# Patient Record
Sex: MALE | Race: WHITE | NOT HISPANIC OR LATINO | Employment: OTHER | URBAN - METROPOLITAN AREA
[De-identification: names, ages, dates, MRNs, and addresses within clinical notes are randomized per-mention and may not be internally consistent; named-entity substitution may affect disease eponyms.]

---

## 2017-06-23 ENCOUNTER — OFFICE VISIT (OUTPATIENT)
Dept: FAMILY MEDICINE CLINIC | Facility: CLINIC | Age: 67
End: 2017-06-23

## 2017-06-23 VITALS
RESPIRATION RATE: 18 BRPM | SYSTOLIC BLOOD PRESSURE: 152 MMHG | OXYGEN SATURATION: 98 % | WEIGHT: 228 LBS | TEMPERATURE: 98.1 F | HEART RATE: 76 BPM | HEIGHT: 74 IN | BODY MASS INDEX: 29.26 KG/M2 | DIASTOLIC BLOOD PRESSURE: 80 MMHG

## 2017-06-23 DIAGNOSIS — I10 ESSENTIAL HYPERTENSION: ICD-10-CM

## 2017-06-23 DIAGNOSIS — N32.81 OVERACTIVE BLADDER: ICD-10-CM

## 2017-06-23 DIAGNOSIS — E78.5 HYPERLIPIDEMIA, UNSPECIFIED HYPERLIPIDEMIA TYPE: Primary | ICD-10-CM

## 2017-06-23 DIAGNOSIS — R53.83 OTHER FATIGUE: ICD-10-CM

## 2017-06-23 DIAGNOSIS — G47.9 DYSSOMNIA: ICD-10-CM

## 2017-06-23 DIAGNOSIS — R60.0 EDEMA OF RIGHT LOWER EXTREMITY: ICD-10-CM

## 2017-06-23 DIAGNOSIS — R35.1 NOCTURIA: ICD-10-CM

## 2017-06-23 DIAGNOSIS — R09.81 CHRONIC NASAL CONGESTION: ICD-10-CM

## 2017-06-23 PROCEDURE — 99213 OFFICE O/P EST LOW 20 MIN: CPT

## 2017-06-23 NOTE — PROGRESS NOTES
Jessica Kuhn is a 67 y.o. male. Patient is here today for   Chief Complaint   Patient presents with   • Sleeping Problem     patient c/o not being able to sleep due to frequent trips to the bathroom and congestion          Vitals:    06/23/17 0824   BP: 152/80   Pulse: 76   Resp: 18   Temp: 98.1 °F (36.7 °C)   SpO2: 98%     The following portions of the patient's history were reviewed and updated as appropriate: allergies, current medications, past family history, past medical history, past social history, past surgical history and problem list.    Past Medical History:   Diagnosis Date   • Hyperlipidemia    • Hypertension       No Known Allergies   Social History     Social History   • Marital status:      Spouse name: N/A   • Number of children: N/A   • Years of education: N/A     Occupational History   • Not on file.     Social History Main Topics   • Smoking status: Never Smoker   • Smokeless tobacco: Not on file   • Alcohol use Yes   • Drug use: Not on file   • Sexual activity: Not on file     Other Topics Concern   • Not on file     Social History Narrative        Current Outpatient Prescriptions:   •  lisinopril (PRINIVIL,ZESTRIL) 20 MG tablet, Take 1 tablet by mouth daily., Disp: 90 tablet, Rfl: 3     Objective     History of Present Illness   The patient is here today for follow-up on essential hypertension, mild hyperlipidemia, fatigue, chronic nasal congestion, nocturia, and sleep disturbance    Review of Systems   Constitutional: Positive for fatigue. Negative for chills and fever.   HENT:        The patient has chronic nasal congestion and has been told he has a deviated septum.  He gives no history of a fractured nose.  The patient really does not have significant amount of postnasal drainage, sneezing, or clear rhinorrhea.   Respiratory: Negative for cough, shortness of breath and wheezing.    Cardiovascular: Negative for chest pain.        The patient has noted, for about  one year, that his right leg mildly swells up during the day and typically goes down overnight.  The patient states that his lower leg feels tight but denies any severe discomfort.  The patient has had no history of blood clots in the past although his father did have problems with blood clots in his legs   Gastrointestinal: Negative.    Genitourinary:        During the day the patient states that he urinates normally and has no urinary hesitancy.  However at night the patient urinates as often as every hour.  He denies hesitancy but does have a significant amount of urgency.  There is no dysuria.   Musculoskeletal:        Mild to moderate osteoarthritic aches and pains   Neurological: Negative.    Hematological: Negative.    Psychiatric/Behavioral:        The patient is not sleeping well but attributes this primarily to the nasal congestion and urinary frequency during the night       Physical Exam   Constitutional: He is oriented to person, place, and time. He appears well-developed and well-nourished.   Moderately overweight   HENT:   Mouth/Throat: Oropharynx is clear and moist.   Nose Seems slightly congested   Eyes: Pupils are equal, round, and reactive to light.   Cardiovascular: Normal rate, regular rhythm and normal heart sounds.    Pulmonary/Chest: Effort normal and breath sounds normal. No respiratory distress. He has no wheezes. He has no rales.   Musculoskeletal:   Mild osteoarthritic changes in multiple joints.  There is no significant swelling in the right lower leg or ankle at this time and there is no calf tenderness.  No significant varicosities   Neurological: He is alert and oriented to person, place, and time.   Skin: Skin is warm and dry.   Psychiatric: He has a normal mood and affect.   Nursing note and vitals reviewed.      ASSESSMENT  #1 nasal congestion       #2 essential hypertension       #3 hyperlipidemia       #4 nocturia        #5 sleep disturbance    DISCUSSION/SUMMARY   Initially the  patient's blood pressure was a bit elevated in the 150s systolic.  Upon recheck the blood pressure was 140/78.  The patient will monitor his blood pressure at home and let me know if the average systolic blood pressure exceeds 140.  The patient states that he has had mild swelling of the right lower leg for about the last year.  Typically he wakes up the morning with no swelling but then as the day progresses notices tightness in his right lower leg and a small amount of swelling.  He is having no significant calf muscle pain.  There are no obvious varicosities.  The patient tells me that his father had blood clots in his legs when the patient was in grade school.  I am going to order a venous Doppler study of the right lower extremity to assess his venous system.  The patient has no problems with urination during the day but at night he most of the time has to get up every 1-2 hours to urinate and has a feeling of urgency.  There is no dysuria.  No significant urinary hesitancy.  The patient states that his nose gets very congested at night and he has difficulty sleeping because of that as well.  Currently he has been taking some type of over-the-counter any histamines decongest and accommodation.  I have recommended that he transition to using over-the-counter Flonase nasal spray 2 sprays once a day and each nostril to see if this would help.  If it does not help he needs to see an ear nose and throat specialist because of his history of deviated septum.  The patient has a history of essential hypertension and mild hyperlipidemia as well as fatigue.  Today we will draw labs because the patient is essentially fasting    PLAN  Today we will draw a CMP, lipid panel, free T4, TSH, CBC, and total testosterone level.  No Follow-up on file.

## 2017-06-26 LAB
ALBUMIN SERPL-MCNC: 4.4 G/DL (ref 3.5–5.2)
ALBUMIN/GLOB SERPL: 1.7 G/DL
ALP SERPL-CCNC: 67 U/L (ref 39–117)
ALT SERPL-CCNC: 14 U/L (ref 1–41)
AST SERPL-CCNC: 19 U/L (ref 1–40)
BASOPHILS # BLD AUTO: 0.02 10*3/MM3 (ref 0–0.2)
BASOPHILS NFR BLD AUTO: 0.3 % (ref 0–1.5)
BILIRUB SERPL-MCNC: 0.3 MG/DL (ref 0.1–1.2)
BUN SERPL-MCNC: 16 MG/DL (ref 8–23)
BUN/CREAT SERPL: 16.3 (ref 7–25)
CALCIUM SERPL-MCNC: 9.8 MG/DL (ref 8.6–10.5)
CHLORIDE SERPL-SCNC: 102 MMOL/L (ref 98–107)
CHOLEST SERPL-MCNC: 220 MG/DL (ref 0–200)
CO2 SERPL-SCNC: 23.9 MMOL/L (ref 22–29)
CONV COMMENT: ABNORMAL
CREAT SERPL-MCNC: 0.98 MG/DL (ref 0.76–1.27)
EOSINOPHIL # BLD AUTO: 0.17 10*3/MM3 (ref 0–0.7)
EOSINOPHIL NFR BLD AUTO: 3 % (ref 0.3–6.2)
ERYTHROCYTE [DISTWIDTH] IN BLOOD BY AUTOMATED COUNT: 13.6 % (ref 11.5–14.5)
GLOBULIN SER CALC-MCNC: 2.6 GM/DL
GLUCOSE SERPL-MCNC: 102 MG/DL (ref 65–99)
HCT VFR BLD AUTO: 46.4 % (ref 40.4–52.2)
HDLC SERPL-MCNC: 76 MG/DL (ref 40–60)
HGB BLD-MCNC: 15.5 G/DL (ref 13.7–17.6)
IMM GRANULOCYTES # BLD: 0 10*3/MM3 (ref 0–0.03)
IMM GRANULOCYTES NFR BLD: 0 % (ref 0–0.5)
LDLC SERPL CALC-MCNC: 130 MG/DL (ref 0–100)
LDLC/HDLC SERPL: 1.71 {RATIO}
LYMPHOCYTES # BLD AUTO: 1.29 10*3/MM3 (ref 0.9–4.8)
LYMPHOCYTES NFR BLD AUTO: 22.5 % (ref 19.6–45.3)
MCH RBC QN AUTO: 31.2 PG (ref 27–32.7)
MCHC RBC AUTO-ENTMCNC: 33.4 G/DL (ref 32.6–36.4)
MCV RBC AUTO: 93.4 FL (ref 79.8–96.2)
MONOCYTES # BLD AUTO: 0.46 10*3/MM3 (ref 0.2–1.2)
MONOCYTES NFR BLD AUTO: 8 % (ref 5–12)
NEUTROPHILS # BLD AUTO: 3.8 10*3/MM3 (ref 1.9–8.1)
NEUTROPHILS NFR BLD AUTO: 66.2 % (ref 42.7–76)
PLATELET # BLD AUTO: 187 10*3/MM3 (ref 140–500)
POTASSIUM SERPL-SCNC: 4.4 MMOL/L (ref 3.5–5.2)
PROT SERPL-MCNC: 7 G/DL (ref 6–8.5)
PSA SERPL-MCNC: 1.75 NG/ML (ref 0–4)
RBC # BLD AUTO: 4.97 10*6/MM3 (ref 4.6–6)
SODIUM SERPL-SCNC: 143 MMOL/L (ref 136–145)
T4 FREE SERPL-MCNC: 1.19 NG/DL (ref 0.93–1.7)
TESTOST FREE SERPL-MCNC: 5.9 PG/ML (ref 6.6–18.1)
TESTOST SERPL-MCNC: 489 NG/DL (ref 348–1197)
TRIGL SERPL-MCNC: 72 MG/DL (ref 0–150)
TSH SERPL DL<=0.005 MIU/L-ACNC: 1.79 MIU/ML (ref 0.27–4.2)
VLDLC SERPL CALC-MCNC: 14.4 MG/DL (ref 5–40)
WBC # BLD AUTO: 5.74 10*3/MM3 (ref 4.5–10.7)

## 2017-06-27 ENCOUNTER — HOSPITAL ENCOUNTER (OUTPATIENT)
Dept: CARDIOLOGY | Facility: HOSPITAL | Age: 67
Discharge: HOME | End: 2017-06-27

## 2017-06-27 DIAGNOSIS — R60.0 EDEMA OF RIGHT LOWER EXTREMITY: ICD-10-CM

## 2017-06-27 LAB
BH CV LOWER VASCULAR LEFT COMMON FEMORAL AUGMENT: NORMAL
BH CV LOWER VASCULAR LEFT COMMON FEMORAL COMPETENT: NORMAL
BH CV LOWER VASCULAR LEFT COMMON FEMORAL COMPRESS: NORMAL
BH CV LOWER VASCULAR LEFT COMMON FEMORAL PHASIC: NORMAL
BH CV LOWER VASCULAR LEFT COMMON FEMORAL SPONT: NORMAL
BH CV LOWER VASCULAR RIGHT COMMON FEMORAL AUGMENT: NORMAL
BH CV LOWER VASCULAR RIGHT COMMON FEMORAL COMPETENT: NORMAL
BH CV LOWER VASCULAR RIGHT COMMON FEMORAL COMPRESS: NORMAL
BH CV LOWER VASCULAR RIGHT COMMON FEMORAL PHASIC: NORMAL
BH CV LOWER VASCULAR RIGHT COMMON FEMORAL SPONT: NORMAL
BH CV LOWER VASCULAR RIGHT DISTAL FEMORAL COMPRESS: NORMAL
BH CV LOWER VASCULAR RIGHT GASTRONEMIUS COMPRESS: NORMAL
BH CV LOWER VASCULAR RIGHT GREATER SAPH AK COMPRESS: NORMAL
BH CV LOWER VASCULAR RIGHT GREATER SAPH BK COMPRESS: NORMAL
BH CV LOWER VASCULAR RIGHT MID FEMORAL AUGMENT: NORMAL
BH CV LOWER VASCULAR RIGHT MID FEMORAL COMPETENT: NORMAL
BH CV LOWER VASCULAR RIGHT MID FEMORAL COMPRESS: NORMAL
BH CV LOWER VASCULAR RIGHT MID FEMORAL PHASIC: NORMAL
BH CV LOWER VASCULAR RIGHT MID FEMORAL SPONT: NORMAL
BH CV LOWER VASCULAR RIGHT PERONEAL COMPRESS: NORMAL
BH CV LOWER VASCULAR RIGHT POPLITEAL AUGMENT: NORMAL
BH CV LOWER VASCULAR RIGHT POPLITEAL COMPETENT: NORMAL
BH CV LOWER VASCULAR RIGHT POPLITEAL COMPRESS: NORMAL
BH CV LOWER VASCULAR RIGHT POPLITEAL PHASIC: NORMAL
BH CV LOWER VASCULAR RIGHT POPLITEAL SPONT: NORMAL
BH CV LOWER VASCULAR RIGHT POSTERIOR TIBIAL COMPRESS: NORMAL
BH CV LOWER VASCULAR RIGHT PROXIMAL FEMORAL COMPRESS: NORMAL
BH CV LOWER VASCULAR RIGHT SAPHENOFEMORAL JUNCTION AUGMENT: NORMAL
BH CV LOWER VASCULAR RIGHT SAPHENOFEMORAL JUNCTION COMPETENT: NORMAL
BH CV LOWER VASCULAR RIGHT SAPHENOFEMORAL JUNCTION COMPRESS: NORMAL
BH CV LOWER VASCULAR RIGHT SAPHENOFEMORAL JUNCTION PHASIC: NORMAL
BH CV LOWER VASCULAR RIGHT SAPHENOFEMORAL JUNCTION SPONT: NORMAL

## 2017-06-27 PROCEDURE — 93971 EXTREMITY STUDY: CPT

## 2017-09-12 RX ORDER — LISINOPRIL 20 MG/1
TABLET ORAL
Qty: 90 TABLET | Refills: 2 | Status: SHIPPED | OUTPATIENT
Start: 2017-09-12 | End: 2018-07-09 | Stop reason: SDUPTHER

## 2018-07-02 RX ORDER — LISINOPRIL 20 MG/1
TABLET ORAL
Qty: 90 TABLET | Refills: 1 | OUTPATIENT
Start: 2018-07-02

## 2018-07-09 RX ORDER — LISINOPRIL 20 MG/1
20 TABLET ORAL DAILY
Qty: 90 TABLET | Refills: 0 | Status: SHIPPED | OUTPATIENT
Start: 2018-07-09 | End: 2018-10-17 | Stop reason: SDUPTHER

## 2018-07-17 ENCOUNTER — OFFICE VISIT (OUTPATIENT)
Dept: FAMILY MEDICINE CLINIC | Facility: CLINIC | Age: 68
End: 2018-07-17

## 2018-07-17 VITALS
DIASTOLIC BLOOD PRESSURE: 82 MMHG | WEIGHT: 229 LBS | TEMPERATURE: 97.5 F | RESPIRATION RATE: 18 BRPM | BODY MASS INDEX: 29.39 KG/M2 | HEART RATE: 86 BPM | HEIGHT: 74 IN | SYSTOLIC BLOOD PRESSURE: 154 MMHG | OXYGEN SATURATION: 97 %

## 2018-07-17 DIAGNOSIS — I10 ESSENTIAL HYPERTENSION: Primary | ICD-10-CM

## 2018-07-17 DIAGNOSIS — E78.5 DIET-CONTROLLED HYPERLIPIDEMIA: ICD-10-CM

## 2018-07-17 DIAGNOSIS — R73.9 BORDERLINE HYPERGLYCEMIA: ICD-10-CM

## 2018-07-17 PROCEDURE — 99213 OFFICE O/P EST LOW 20 MIN: CPT

## 2018-07-17 NOTE — PROGRESS NOTES
Jessica Kuhn is a 68 y.o. male. Patient is here today for   Chief Complaint   Patient presents with   • Follow-up   • Hypertension          Vitals:    07/17/18 0953   BP: 154/82   Pulse: 86   Resp: 18   Temp: 97.5 °F (36.4 °C)   SpO2: 97%     The following portions of the patient's history were reviewed and updated as appropriate: allergies, current medications, past family history, past medical history, past social history, past surgical history and problem list.    Past Medical History:   Diagnosis Date   • Hyperlipidemia    • Hypertension       No Known Allergies   Social History     Social History   • Marital status:      Spouse name: N/A   • Number of children: N/A   • Years of education: N/A     Occupational History   • Not on file.     Social History Main Topics   • Smoking status: Never Smoker   • Smokeless tobacco: Not on file   • Alcohol use Yes   • Drug use: Unknown   • Sexual activity: Not on file     Other Topics Concern   • Not on file     Social History Narrative   • No narrative on file        Current Outpatient Prescriptions:   •  lisinopril (PRINIVIL,ZESTRIL) 20 MG tablet, Take 1 tablet by mouth Daily., Disp: 90 tablet, Rfl: 0     Objective     History of Present Illness   The patient is here today for follow-up on essential hypertension    Review of Systems   Constitutional: Negative.    HENT: Negative.    Respiratory: Negative for cough, shortness of breath and wheezing.    Cardiovascular: Negative for chest pain, palpitations and leg swelling.   Gastrointestinal: Negative for abdominal pain, blood in stool, constipation and diarrhea.   Genitourinary:        Mild urinary hesitancy and nocturia.   Musculoskeletal:        Mild aches and pains only   Neurological: Negative.    Hematological: Negative.    Psychiatric/Behavioral: Negative.        Physical Exam   Constitutional: He appears well-developed and well-nourished.   Mildly overweight   Neck:   Carotid pulses normal    Cardiovascular: Normal rate, regular rhythm and normal heart sounds.    Pulmonary/Chest: Effort normal and breath sounds normal. No respiratory distress. He has no wheezes. He has no rales.   Abdominal: Soft. Bowel sounds are normal.   Musculoskeletal: Normal range of motion. He exhibits no edema.   Neurological: He is alert.   Skin: Skin is warm and dry.   Psychiatric: He has a normal mood and affect.   Nursing note and vitals reviewed.      ASSESSMENT  #1 essential hypertension                    #2 mild diet-controlled hyperlipidemia                    #3 borderline hyperglycemia    DISCUSSION/SUMMARY   Blood pressure by me today was 148/82.  The patient is currently on lisinopril 20 mg daily.  I've asked him to check his blood pressures closely at home over the next few weeks.  If his average blood pressure is about 140 or higher systolic I will change him to lisinopril-HCT.  The patient has never had any pneumonia vaccines and will come by the office to get a Prevnar 13 vaccine in the next few days.  I recommend that he get the new shingles vaccine at his pharmacy at his convenience.  The patient had vascular screening done about one year ago which was entirely normal.  I will recheck the patient again in about 6 months.    PLAN  Recheck in about 6 months with fasting CMP, lipid panel and PSA  No Follow-up on file.

## 2018-07-18 ENCOUNTER — CLINICAL SUPPORT (OUTPATIENT)
Dept: FAMILY MEDICINE CLINIC | Facility: CLINIC | Age: 68
End: 2018-07-18

## 2018-07-18 DIAGNOSIS — Z23 NEED FOR VACCINATION: Primary | ICD-10-CM

## 2018-07-18 PROCEDURE — G0009 ADMIN PNEUMOCOCCAL VACCINE: HCPCS

## 2018-07-18 PROCEDURE — 90670 PCV13 VACCINE IM: CPT

## 2018-09-23 ENCOUNTER — APPOINTMENT (OUTPATIENT)
Dept: CT IMAGING | Facility: HOSPITAL | Age: 68
End: 2018-09-23

## 2018-09-23 ENCOUNTER — HOSPITAL ENCOUNTER (EMERGENCY)
Facility: HOSPITAL | Age: 68
Discharge: HOME | End: 2018-09-23
Attending: EMERGENCY MEDICINE | Admitting: EMERGENCY MEDICINE

## 2018-09-23 VITALS
HEIGHT: 74 IN | OXYGEN SATURATION: 96 % | RESPIRATION RATE: 18 BRPM | WEIGHT: 238 LBS | BODY MASS INDEX: 30.54 KG/M2 | TEMPERATURE: 98.3 F | DIASTOLIC BLOOD PRESSURE: 87 MMHG | HEART RATE: 88 BPM | SYSTOLIC BLOOD PRESSURE: 157 MMHG

## 2018-09-23 DIAGNOSIS — N20.0 KIDNEY STONE: Primary | ICD-10-CM

## 2018-09-23 LAB
ALBUMIN SERPL-MCNC: 4.6 G/DL (ref 3.5–5.2)
ALBUMIN/GLOB SERPL: 1.7 G/DL
ALP SERPL-CCNC: 70 U/L (ref 39–117)
ALT SERPL W P-5'-P-CCNC: 17 U/L (ref 1–41)
ANION GAP SERPL CALCULATED.3IONS-SCNC: 15.8 MMOL/L
AST SERPL-CCNC: 25 U/L (ref 1–40)
BACTERIA UR QL AUTO: ABNORMAL /HPF
BASOPHILS # BLD AUTO: 0.02 10*3/MM3 (ref 0–0.2)
BASOPHILS NFR BLD AUTO: 0.2 % (ref 0–1.5)
BILIRUB SERPL-MCNC: 0.3 MG/DL (ref 0.1–1.2)
BILIRUB UR QL STRIP: NEGATIVE
BUN BLD-MCNC: 19 MG/DL (ref 8–23)
BUN/CREAT SERPL: 17.1 (ref 7–25)
CALCIUM SPEC-SCNC: 9.9 MG/DL (ref 8.6–10.5)
CHLORIDE SERPL-SCNC: 101 MMOL/L (ref 98–107)
CLARITY UR: CLEAR
CO2 SERPL-SCNC: 24.2 MMOL/L (ref 22–29)
COLOR UR: YELLOW
CREAT BLD-MCNC: 1.11 MG/DL (ref 0.76–1.27)
DEPRECATED RDW RBC AUTO: 42.8 FL (ref 37–54)
EOSINOPHIL # BLD AUTO: 0.03 10*3/MM3 (ref 0–0.7)
EOSINOPHIL NFR BLD AUTO: 0.3 % (ref 0.3–6.2)
ERYTHROCYTE [DISTWIDTH] IN BLOOD BY AUTOMATED COUNT: 12.7 % (ref 11.5–14.5)
GFR SERPL CREATININE-BSD FRML MDRD: 65 ML/MIN/1.73
GLOBULIN UR ELPH-MCNC: 2.7 GM/DL
GLUCOSE BLD-MCNC: 127 MG/DL (ref 65–99)
GLUCOSE UR STRIP-MCNC: NEGATIVE MG/DL
HCT VFR BLD AUTO: 46.8 % (ref 40.4–52.2)
HGB BLD-MCNC: 15.4 G/DL (ref 13.7–17.6)
HGB UR QL STRIP.AUTO: ABNORMAL
HOLD SPECIMEN: NORMAL
HYALINE CASTS UR QL AUTO: ABNORMAL /LPF
IMM GRANULOCYTES # BLD: 0.02 10*3/MM3 (ref 0–0.03)
IMM GRANULOCYTES NFR BLD: 0.2 % (ref 0–0.5)
KETONES UR QL STRIP: ABNORMAL
LEUKOCYTE ESTERASE UR QL STRIP.AUTO: NEGATIVE
LYMPHOCYTES # BLD AUTO: 1.36 10*3/MM3 (ref 0.9–4.8)
LYMPHOCYTES NFR BLD AUTO: 12.9 % (ref 19.6–45.3)
MCH RBC QN AUTO: 30.5 PG (ref 27–32.7)
MCHC RBC AUTO-ENTMCNC: 32.9 G/DL (ref 32.6–36.4)
MCV RBC AUTO: 92.7 FL (ref 79.8–96.2)
MONOCYTES # BLD AUTO: 0.38 10*3/MM3 (ref 0.2–1.2)
MONOCYTES NFR BLD AUTO: 3.6 % (ref 5–12)
NEUTROPHILS # BLD AUTO: 8.73 10*3/MM3 (ref 1.9–8.1)
NEUTROPHILS NFR BLD AUTO: 82.8 % (ref 42.7–76)
NITRITE UR QL STRIP: NEGATIVE
PH UR STRIP.AUTO: <=5 [PH] (ref 5–8)
PLATELET # BLD AUTO: 269 10*3/MM3 (ref 140–500)
PMV BLD AUTO: 10.3 FL (ref 6–12)
POTASSIUM BLD-SCNC: 4.2 MMOL/L (ref 3.5–5.2)
PROT SERPL-MCNC: 7.3 G/DL (ref 6–8.5)
PROT UR QL STRIP: NEGATIVE
RBC # BLD AUTO: 5.05 10*6/MM3 (ref 4.6–6)
RBC # UR: ABNORMAL /HPF
REF LAB TEST METHOD: ABNORMAL
SODIUM BLD-SCNC: 141 MMOL/L (ref 136–145)
SP GR UR STRIP: 1.02 (ref 1–1.03)
SQUAMOUS #/AREA URNS HPF: ABNORMAL /HPF
UROBILINOGEN UR QL STRIP: ABNORMAL
WBC NRBC COR # BLD: 10.54 10*3/MM3 (ref 4.5–10.7)
WBC UR QL AUTO: ABNORMAL /HPF
WHOLE BLOOD HOLD SPECIMEN: NORMAL

## 2018-09-23 PROCEDURE — 96374 THER/PROPH/DIAG INJ IV PUSH: CPT

## 2018-09-23 PROCEDURE — 74176 CT ABD & PELVIS W/O CONTRAST: CPT

## 2018-09-23 PROCEDURE — 81001 URINALYSIS AUTO W/SCOPE: CPT | Performed by: NURSE PRACTITIONER

## 2018-09-23 PROCEDURE — 99283 EMERGENCY DEPT VISIT LOW MDM: CPT

## 2018-09-23 PROCEDURE — 25010000002 ONDANSETRON PER 1 MG: Performed by: NURSE PRACTITIONER

## 2018-09-23 PROCEDURE — 96375 TX/PRO/DX INJ NEW DRUG ADDON: CPT

## 2018-09-23 PROCEDURE — 85025 COMPLETE CBC W/AUTO DIFF WBC: CPT | Performed by: NURSE PRACTITIONER

## 2018-09-23 PROCEDURE — 25010000002 HYDROMORPHONE PER 4 MG: Performed by: NURSE PRACTITIONER

## 2018-09-23 PROCEDURE — 80053 COMPREHEN METABOLIC PANEL: CPT | Performed by: NURSE PRACTITIONER

## 2018-09-23 RX ORDER — SODIUM CHLORIDE 0.9 % (FLUSH) 0.9 %
10 SYRINGE (ML) INJECTION AS NEEDED
Status: DISCONTINUED | OUTPATIENT
Start: 2018-09-23 | End: 2018-09-23 | Stop reason: HOSPADM

## 2018-09-23 RX ORDER — ONDANSETRON 4 MG/1
4 TABLET, ORALLY DISINTEGRATING ORAL EVERY 6 HOURS PRN
Qty: 12 TABLET | Refills: 0 | Status: SHIPPED | OUTPATIENT
Start: 2018-09-23 | End: 2019-01-23

## 2018-09-23 RX ORDER — ONDANSETRON 2 MG/ML
4 INJECTION INTRAMUSCULAR; INTRAVENOUS ONCE
Status: COMPLETED | OUTPATIENT
Start: 2018-09-23 | End: 2018-09-23

## 2018-09-23 RX ORDER — HYDROCODONE BITARTRATE AND ACETAMINOPHEN 5; 325 MG/1; MG/1
1 TABLET ORAL EVERY 4 HOURS PRN
Qty: 16 TABLET | Refills: 0 | Status: SHIPPED | OUTPATIENT
Start: 2018-09-23 | End: 2019-01-23

## 2018-09-23 RX ORDER — TAMSULOSIN HYDROCHLORIDE 0.4 MG/1
1 CAPSULE ORAL DAILY
Qty: 7 CAPSULE | Refills: 0 | Status: SHIPPED | OUTPATIENT
Start: 2018-09-23 | End: 2019-01-23

## 2018-09-23 RX ADMIN — HYDROMORPHONE HYDROCHLORIDE 0.5 MG: 10 INJECTION, SOLUTION INTRAMUSCULAR; INTRAVENOUS; SUBCUTANEOUS at 06:34

## 2018-09-23 RX ADMIN — ONDANSETRON 4 MG: 2 INJECTION INTRAMUSCULAR; INTRAVENOUS at 06:37

## 2018-09-23 RX ADMIN — SODIUM CHLORIDE 1000 ML: 9 INJECTION, SOLUTION INTRAVENOUS at 06:42

## 2018-09-23 RX ADMIN — HYDROMORPHONE HYDROCHLORIDE 0.5 MG: 1 INJECTION, SOLUTION INTRAMUSCULAR; INTRAVENOUS; SUBCUTANEOUS at 06:50

## 2018-09-23 NOTE — ED NOTES
"Pt c/o L flank pain that started Thursday and went away. No pain since till last night at [place] and has been intermittent since. Pt says \"i got in the hot tub after the game last night and I don't know if that aggravated anything\"  Reports pain began as lower abdominal cramp and has now moved to L flank.  Denies burning or difficulty with urination. LBM yesterday afternoon.  Denies knowing of fevers at home     Patrizia Moreno RN  09/23/18 8302    "

## 2018-09-23 NOTE — ED PROVIDER NOTES
"  EMERGENCY DEPARTMENT ENCOUNTER    CHIEF COMPLAINT  Chief Complaint: left flank pain  History given by: patient  History limited by: nothing  Time Seen: 6:18 AM  Room Number: 11/11  PMD: Moose Quarles MD      HPI:  Pt is a 68 y.o. male who presents with intermittent left flank pain with radiation to the left side of the abdomen that began three days ago. Pt reports that the pain had resolved but then returned around 1200 this morning. The patient states that the pain lasted for \"about 15 minutes\" and then returned around 0330 as a constant pain. Patient also complains of dysuria and nausea. Patient denies fever or chills. Pt reports that he came to the ED because he was concerned that he had a kidney stone. Pt denies a hx of kidney stones. There are no other complaints at this time.    Past Medical History of hypertension or hyperlipidemia.     Duration: 3 days  Timing: intermittent  Location: left flank  Radiation: to the left side of the abdomen  Quality: pain  Intensity/Severity: moderate  Progression: Pt reports that the pain had resolved but then returned around 1200 this morning. The patient states that the pain lasted for \"about 15 minutes\" and then returned around 0330 as a constant pain.  Aggravating Factors: none specified  Alleviating Factors: none specified  Previous Episodes: none specified  Treatment before arrival: none specified    PAST MEDICAL HISTORY  Active Ambulatory Problems     Diagnosis Date Noted   • Fatigue 06/10/2016   • Hypertension 06/10/2016   • Diet-controlled hyperlipidemia 06/10/2016   • Dyssomnia 06/10/2016   • Overactive bladder 06/23/2017   • Chronic nasal congestion 06/23/2017   • Edema of right lower extremity 06/23/2017   • Borderline hyperglycemia 07/17/2018     Resolved Ambulatory Problems     Diagnosis Date Noted   • No Resolved Ambulatory Problems     Past Medical History:   Diagnosis Date   • Hyperlipidemia    • Hypertension        PAST SURGICAL HISTORY  Past " Surgical History:   Procedure Laterality Date   • APPENDECTOMY     • BASAL CELL CARCINOMA EXCISION     • HERNIA REPAIR     • KNEE SURGERY Right    • SHOULDER SURGERY Right        FAMILY HISTORY  Family History   Problem Relation Age of Onset   • Hypertension Father    • Hyperlipidemia Father    • Breast cancer Sister        SOCIAL HISTORY  Social History     Social History   • Marital status:      Spouse name: N/A   • Number of children: N/A   • Years of education: N/A     Occupational History   • Not on file.     Social History Main Topics   • Smoking status: Never Smoker   • Smokeless tobacco: Not on file   • Alcohol use Yes   • Drug use: No   • Sexual activity: Not on file     Other Topics Concern   • Not on file     Social History Narrative   • No narrative on file         ALLERGIES  Patient has no known allergies.    REVIEW OF SYSTEMS  Review of Systems   Constitutional: Negative for chills and fever.   HENT: Negative for sore throat.    Respiratory: Negative for shortness of breath.    Cardiovascular: Negative for chest pain.   Gastrointestinal: Positive for nausea. Negative for vomiting.   Genitourinary: Positive for dysuria and flank pain (left).   Musculoskeletal: Negative for back pain.   Skin: Negative for rash.   Neurological: Negative for dizziness.   Psychiatric/Behavioral: The patient is not nervous/anxious.        PHYSICAL EXAM  ED Triage Vitals   Temp Heart Rate Resp BP SpO2   09/23/18 0514 09/23/18 0514 09/23/18 0514 09/23/18 0605 09/23/18 0514   98 °F (36.7 °C) 93 18 (!) 184/91 96 %       Physical Exam   Constitutional: He is well-developed, well-nourished, and in no distress. No distress.   HENT:   Head: Atraumatic.   Mouth/Throat: Mucous membranes are normal.   Eyes: No scleral icterus.   Neck: Normal range of motion.   Cardiovascular: Normal rate, regular rhythm and normal heart sounds.    Pulmonary/Chest: Effort normal and breath sounds normal.   Abdominal: Soft. Bowel sounds are  normal. There is tenderness in the left lower quadrant. There is CVA tenderness (left).   Musculoskeletal: Normal range of motion.   Neurological: He is alert.   Skin: Skin is warm and dry.   Psychiatric: Mood and affect normal.   Nursing note and vitals reviewed.      LAB RESULTS  Recent Results (from the past 24 hour(s))   Comprehensive Metabolic Panel    Collection Time: 09/23/18  6:39 AM   Result Value Ref Range    Glucose 127 (H) 65 - 99 mg/dL    BUN 19 8 - 23 mg/dL    Creatinine 1.11 0.76 - 1.27 mg/dL    Sodium 141 136 - 145 mmol/L    Potassium 4.2 3.5 - 5.2 mmol/L    Chloride 101 98 - 107 mmol/L    CO2 24.2 22.0 - 29.0 mmol/L    Calcium 9.9 8.6 - 10.5 mg/dL    Total Protein 7.3 6.0 - 8.5 g/dL    Albumin 4.60 3.50 - 5.20 g/dL    ALT (SGPT) 17 1 - 41 U/L    AST (SGOT) 25 1 - 40 U/L    Alkaline Phosphatase 70 39 - 117 U/L    Total Bilirubin 0.3 0.1 - 1.2 mg/dL    eGFR Non African Amer 65 >60 mL/min/1.73    Globulin 2.7 gm/dL    A/G Ratio 1.7 g/dL    BUN/Creatinine Ratio 17.1 7.0 - 25.0    Anion Gap 15.8 mmol/L   Urinalysis With Microscopic If Indicated (No Culture) - Urine, Clean Catch    Collection Time: 09/23/18  6:39 AM   Result Value Ref Range    Color, UA Yellow Yellow, Straw    Appearance, UA Clear Clear    pH, UA <=5.0 5.0 - 8.0    Specific Gravity, UA 1.025 1.005 - 1.030    Glucose, UA Negative Negative    Ketones, UA 15 mg/dL (1+) (A) Negative    Bilirubin, UA Negative Negative    Blood, UA Large (3+) (A) Negative    Protein, UA Negative Negative    Leuk Esterase, UA Negative Negative    Nitrite, UA Negative Negative    Urobilinogen, UA 1.0 E.U./dL 0.2 - 1.0 E.U./dL   CBC Auto Differential    Collection Time: 09/23/18  6:39 AM   Result Value Ref Range    WBC 10.54 4.50 - 10.70 10*3/mm3    RBC 5.05 4.60 - 6.00 10*6/mm3    Hemoglobin 15.4 13.7 - 17.6 g/dL    Hematocrit 46.8 40.4 - 52.2 %    MCV 92.7 79.8 - 96.2 fL    MCH 30.5 27.0 - 32.7 pg    MCHC 32.9 32.6 - 36.4 g/dL    RDW 12.7 11.5 - 14.5 %    RDW-SD  42.8 37.0 - 54.0 fl    MPV 10.3 6.0 - 12.0 fL    Platelets 269 140 - 500 10*3/mm3    Neutrophil % 82.8 (H) 42.7 - 76.0 %    Lymphocyte % 12.9 (L) 19.6 - 45.3 %    Monocyte % 3.6 (L) 5.0 - 12.0 %    Eosinophil % 0.3 0.3 - 6.2 %    Basophil % 0.2 0.0 - 1.5 %    Immature Grans % 0.2 0.0 - 0.5 %    Neutrophils, Absolute 8.73 (H) 1.90 - 8.10 10*3/mm3    Lymphocytes, Absolute 1.36 0.90 - 4.80 10*3/mm3    Monocytes, Absolute 0.38 0.20 - 1.20 10*3/mm3    Eosinophils, Absolute 0.03 0.00 - 0.70 10*3/mm3    Basophils, Absolute 0.02 0.00 - 0.20 10*3/mm3    Immature Grans, Absolute 0.02 0.00 - 0.03 10*3/mm3   Light Blue Top    Collection Time: 18  6:39 AM   Result Value Ref Range    Extra Tube hold for add-on    Gold Top - SST    Collection Time: 18  6:39 AM   Result Value Ref Range    Extra Tube Hold for add-ons.    Urinalysis, Microscopic Only - Urine, Clean Catch    Collection Time: 18  6:39 AM   Result Value Ref Range    RBC, UA Too Numerous to Count (A) None Seen, 0-2 /HPF    WBC, UA 0-2 None Seen, 0-2 /HPF    Bacteria, UA None Seen None Seen /HPF    Squamous Epithelial Cells, UA 0-2 None Seen, 0-2 /HPF    Hyaline Casts, UA None Seen None Seen /LPF    Methodology Automated Microscopy        I ordered the above labs and reviewed the results    RADIOLOGY  CT Abdomen Pelvis Without Contrast         The CT scan was performed as an emergency procedure through the abdomen  and pelvis without contrast and demonstrates the followin. There is mild left renal obstruction secondary to a 4 mm stone at the  left ureterovesical junction as seen on image 128. There are several  tiny nonobstructing stones in the right kidney.  2. There is a small hiatus hernia. There is some minimal atelectasis and  possible inflammatory change at the left base medially. The liver,  spleen, pancreas, and both adrenal glands are unremarkable without  intravenous contrast. The gallbladder shows no gallstones or wall  thickening.  3.  There is no aortic aneurysm or retroperitoneal lymphadenopathy. The  large and small bowel loops are normal in caliber. The remainder of the  pelvis is unremarkable except for mild enlargement of the prostate  measuring 5.7 cm.         I ordered the above noted radiological studies and reviewed the images on the PACS system.          PROGRESS AND CONSULTS    0730: Reviewed pt's history and workup with Dr. Bailey.  At bedside evaluation, they agree with the plan of care.    0800:  Reviewed implications of results, diagnosis, meds, responsibility to follow up, warning signs and symptoms of possible worsening, potential complications and reasons to return to ER with patient.  Discussed all results and noted any abnormalities with patient.  Discussed absolute need to recheck abnormalities with Urology    Discussed plan for discharge, as there is no emergent indication for admission.  Pt is agreeable and understands need for follow up and repeat testing.  Pt is aware that discharge does not mean that nothing is wrong but it indicates no emergency is present.  Pt is discharged with instructions to follow up with primary care doctor to have their blood pressure rechecked.       DIAGNOSIS  Final diagnoses:   Kidney stone       FOLLOW UP   Moose Quarles MD  [address] [facility] [address]  [phone]    Call in 1 day      Jesús Pradhan Jr., MD  [address] [place] [address] [place] [id number] [phone]    Call in 1 day        RX     Medication List      New Prescriptions    HYDROcodone-acetaminophen 5-325 MG per tablet  Commonly known as:  NORCO  Take 1 tablet by mouth Every 4 (Four) Hours As Needed for Moderate Pain .     ondansetron ODT 4 MG disintegrating tablet  Commonly known as:  ZOFRAN-ODT  Take 1 tablet by mouth Every 6 (Six) Hours As Needed for Nausea or   Vomiting.     tamsulosin 0.4 MG capsule 24 hr capsule  Commonly known as:  FLOMAX  Take 1 capsule by mouth Daily.          [facility] report [id number]   "reviewed.  Risks, benefits, alternatives discussed with patient.  Pt consents to treatment and agrees to follow up with PMD tomorrow for further care and any other prescriptions.           COURSE & MEDICAL DECISION MAKING  Pertinent Labs and Imaging studies that were ordered and reviewed are noted above.  Results were reviewed/discussed with the patient and they were also made aware of online assess.   Pt also made aware that some labs, such as cultures, will not be resulted during ER visit and follow up with PMD is necessary.     MEDICATIONS GIVEN IN ER  Medications   sodium chloride 0.9 % flush 10 mL (not administered)   sodium chloride 0.9 % bolus 1,000 mL (0 mL Intravenous Stopped 9/23/18 0825)   HYDROmorphone (DILAUDID) injection 0.5 mg (0.5 mg Intravenous Given 9/23/18 0634)   ondansetron (ZOFRAN) injection 4 mg (4 mg Intravenous Given 9/23/18 0637)   HYDROmorphone (DILAUDID) injection 0.5 mg (0.5 mg Intravenous Given 9/23/18 0650)       /87 (BP Location: Right arm)   Pulse 88   Temp 98 °F (36.7 °C) (Tympanic)   Resp 18   Ht 188 cm (74\")   Wt 108 kg (238 lb)   SpO2 96%   BMI 30.56 kg/m²       I personally reviewed the past medical history, past surgical history, social history, family history, current medications and allergies as they appear in this chart.  The scribe's note accurately reflects the work and decisions made by me.     Documentation assistance provided by nemo Horan for MASHA Goodwin on 9/23/2018 at 6:18 AM. Information recorded by the scribe was done at my direction and has been verified and validated by me.       Brittany Horan  09/23/18 0655       Carmenza Mckinley APRN  09/23/18 8308    "

## 2018-09-23 NOTE — ED PROVIDER NOTES
The DINORAH and I have discussed this patient's history, physical exam, and treatment plan. I have reviewed the documentation and personally had a face to face interaction with the patient  I affirm the documentation and agree with the treatment and plan.  The following describes my personal findings.    The patient presents complaining of L flank pain that radiates to the L abdomen starting a few days ago before resolving and returning again midnight last night. Pt denies vomiting, fever, abd pain, CP, SOA. Pt states his pain is much improved post meds in the ED today.    Limited physical exam:  Patient is nontoxic appearing  Lungs/cardiovascular: CTAB, HRRR  Abdomen: soft, non-tender  Back/extremities: Extremities unremarkable    Labs and imaging reviewed.     0753 - Informed pt of the results of his CT abd/pelvis which shows a 4 mm stone. D/w pt the plan to discharge home with a follow up with urology. Pt understands and agrees with plan. All questions answered.     Documentation assistance provided by nemo Rosenbaum.  Information recorded by the scribe was done at my direction and has been verified and validated by me.         Gold Rosenbaum  09/23/18 0752       Norma Bailey MD  09/23/18 7897

## 2018-09-23 NOTE — DISCHARGE INSTRUCTIONS
Medications as ordered  Increase water intake  Strain all urine  Follow up with Urology -call in am for appointment   Return to er for fever, chills, vomiting, decreased urine output, increased pain, or any new or worsening symptoms

## 2018-09-23 NOTE — ED NOTES
Pt to ED for c/o of left flank pain that started 2 days ago. Pt states the pain had initially went away but kept coming back for a total of 3 times now. No hx of kidney stones.      Debbie Gonzalez RN  09/23/18 0508

## 2018-09-24 ENCOUNTER — TELEPHONE (OUTPATIENT)
Dept: FAMILY MEDICINE CLINIC | Facility: CLINIC | Age: 68
End: 2018-09-24

## 2018-09-24 NOTE — TELEPHONE ENCOUNTER
KIRSTIE: PT CALLED TO LET YOU KNOW HE WAS IN [facility] FOR A KIDNEY STONE. HE WAS RELEASED YESTERDAY, STONE HAS NOT PASSED YET. HE HAS A CALL INTO [facility] FOR AN APPOINTMENT. WAITING TO HEAR BACK.

## 2018-10-17 RX ORDER — LISINOPRIL 20 MG/1
TABLET ORAL
Qty: 90 TABLET | Refills: 1 | Status: SHIPPED | OUTPATIENT
Start: 2018-10-17 | End: 2019-05-14 | Stop reason: SDUPTHER

## 2019-01-14 DIAGNOSIS — I10 ESSENTIAL HYPERTENSION: Primary | ICD-10-CM

## 2019-01-14 DIAGNOSIS — E78.5 HYPERLIPIDEMIA, UNSPECIFIED HYPERLIPIDEMIA TYPE: ICD-10-CM

## 2019-01-14 DIAGNOSIS — Z12.5 SPECIAL SCREENING FOR MALIGNANT NEOPLASM OF PROSTATE: ICD-10-CM

## 2019-01-16 LAB
ALBUMIN SERPL-MCNC: 4.2 G/DL (ref 3.5–5.2)
ALBUMIN/GLOB SERPL: 1.6 G/DL
ALP SERPL-CCNC: 74 U/L (ref 39–117)
ALT SERPL-CCNC: 19 U/L (ref 1–41)
AST SERPL-CCNC: 18 U/L (ref 1–40)
BILIRUB SERPL-MCNC: 0.4 MG/DL (ref 0.1–1.2)
BUN SERPL-MCNC: 16 MG/DL (ref 8–23)
BUN/CREAT SERPL: 17 (ref 7–25)
CALCIUM SERPL-MCNC: 9.6 MG/DL (ref 8.6–10.5)
CHLORIDE SERPL-SCNC: 105 MMOL/L (ref 98–107)
CHOLEST SERPL-MCNC: 241 MG/DL (ref 0–200)
CO2 SERPL-SCNC: 26.8 MMOL/L (ref 22–29)
CREAT SERPL-MCNC: 0.94 MG/DL (ref 0.76–1.27)
GLOBULIN SER CALC-MCNC: 2.7 GM/DL
GLUCOSE SERPL-MCNC: 94 MG/DL (ref 65–99)
HDLC SERPL-MCNC: 81 MG/DL (ref 40–60)
LDLC SERPL CALC-MCNC: 137 MG/DL (ref 0–100)
LDLC/HDLC SERPL: 1.69 {RATIO}
POTASSIUM SERPL-SCNC: 4.2 MMOL/L (ref 3.5–5.2)
PROT SERPL-MCNC: 6.9 G/DL (ref 6–8.5)
SODIUM SERPL-SCNC: 142 MMOL/L (ref 136–145)
TRIGL SERPL-MCNC: 117 MG/DL (ref 0–150)
VLDLC SERPL CALC-MCNC: 23.4 MG/DL (ref 5–40)

## 2019-01-23 ENCOUNTER — OFFICE VISIT (OUTPATIENT)
Dept: FAMILY MEDICINE CLINIC | Facility: CLINIC | Age: 69
End: 2019-01-23

## 2019-01-23 VITALS
BODY MASS INDEX: 30.16 KG/M2 | DIASTOLIC BLOOD PRESSURE: 82 MMHG | WEIGHT: 235 LBS | SYSTOLIC BLOOD PRESSURE: 148 MMHG | TEMPERATURE: 97.4 F | OXYGEN SATURATION: 96 % | HEIGHT: 74 IN | HEART RATE: 81 BPM | RESPIRATION RATE: 18 BRPM

## 2019-01-23 DIAGNOSIS — E78.00 HYPERCHOLESTEROLEMIA: Primary | ICD-10-CM

## 2019-01-23 DIAGNOSIS — I10 ESSENTIAL HYPERTENSION: ICD-10-CM

## 2019-01-23 PROBLEM — R73.9 BORDERLINE HYPERGLYCEMIA: Status: RESOLVED | Noted: 2018-07-17 | Resolved: 2019-01-23

## 2019-01-23 PROCEDURE — 99213 OFFICE O/P EST LOW 20 MIN: CPT

## 2019-01-23 NOTE — PROGRESS NOTES
Jessica Kuhn is a 69 y.o. male. Patient is here today for   Chief Complaint   Patient presents with   • Hyperlipidemia   • Hypertension          Vitals:    01/23/19 0918   BP: 148/82   Pulse: 81   Resp: 18   Temp: 97.4 °F (36.3 °C)   SpO2: 96%     The following portions of the patient's history were reviewed and updated as appropriate: allergies, current medications, past family history, past medical history, past social history, past surgical history and problem list.    Past Medical History:   Diagnosis Date   • Hyperlipidemia    • Hypertension       No Known Allergies   Social History     Socioeconomic History   • Marital status:      Spouse name: Not on file   • Number of children: Not on file   • Years of education: Not on file   • Highest education level: Not on file   Social Needs   • Financial resource strain: Not on file   • Food insecurity - worry: Not on file   • Food insecurity - inability: Not on file   • Transportation needs - medical: Not on file   • Transportation needs - non-medical: Not on file   Occupational History   • Not on file   Tobacco Use   • Smoking status: Never Smoker   Substance and Sexual Activity   • Alcohol use: Yes   • Drug use: No   • Sexual activity: Not on file   Other Topics Concern   • Not on file   Social History Narrative   • Not on file        Current Outpatient Medications:   •  lisinopril (PRINIVIL,ZESTRIL) 20 MG tablet, TAKE ONE TABLET BY MOUTH DAILY, Disp: 90 tablet, Rfl: 1     Objective     History of Present Illness   The patient is here today for follow-up on essential hypertension and hyperlipidemia    Review of Systems   Constitutional:        The patient has not been as physically active over the last 6 months.  He does have a gym membership and will try to use start going to the gym 3 days a week and work out for about 30 minutes.  The patient's diet has been fair but could be better.   HENT: Negative.    Respiratory: Negative for  cough, shortness of breath and wheezing.    Cardiovascular: Negative for chest pain, palpitations and leg swelling.   Gastrointestinal: Negative for abdominal pain, blood in stool, constipation and diarrhea.   Genitourinary:        The patient had a kidney stone last fall which has been removed and the patient is asymptomatic at this time.   Musculoskeletal:        Mild aches and pains only   Neurological: Negative.    Hematological: Negative.    Psychiatric/Behavioral: Negative.        Physical Exam   Constitutional: He is oriented to person, place, and time. He appears well-developed and well-nourished.   Moderately overweight   Eyes: Pupils are equal, round, and reactive to light.   Neck:   Carotid pulses normal   Cardiovascular: Normal rate, regular rhythm and normal heart sounds.   Pulmonary/Chest: Effort normal and breath sounds normal. No respiratory distress. He has no wheezes. He has no rales.   Abdominal: Soft. Bowel sounds are normal.   Musculoskeletal: Normal range of motion. He exhibits no edema.   Neurological: He is alert and oriented to person, place, and time.   Skin: Skin is warm and dry.   Psychiatric: He has a normal mood and affect.   Nursing note and vitals reviewed.      ASSESSMENT  #1 essential hypertension                  #2 hypercholesterolemia    DISCUSSION/SUMMARY   The patient's blood pressure is elevated today at 148/82.  I've asked him to closely monitor his blood pressures at home and if his blood pressure is greater than 135 over 85 on average he will let us know.  He may need adjustment in his medication.  Total cholesterol is 241, triglycerides 117, HDL cholesterol 81 and LDL cholesterol is 137.  The patient's total cholesterol and LDL cholesterol are slightly higher than last year.  The patient has no family history for premature coronary artery disease although one of his brothers did have to have an angioplasty in his 70s.  The patient's father may have had a heart attack when  he passed away in his late  70s.  The patient does not smoke.  We did discuss a low sugar, low starch and low saturated fat diet as well as him increasing his aerobic exercise.  The patient does not particularly want to go on any type of medication for his cholesterol.  Recent PSA done at his urologist's office was normal at about 2.1.    PLAN  Recheck in 6 months with fasting CMP and lipid panel  No Follow-up on file.

## 2019-05-14 RX ORDER — LISINOPRIL 20 MG/1
TABLET ORAL
Qty: 90 TABLET | Refills: 1 | Status: SHIPPED | OUTPATIENT
Start: 2019-05-14 | End: 2019-07-31

## 2019-07-23 DIAGNOSIS — E78.00 HYPERCHOLESTEROLEMIA: ICD-10-CM

## 2019-07-23 DIAGNOSIS — I10 ESSENTIAL HYPERTENSION: Primary | ICD-10-CM

## 2019-07-23 LAB
ALBUMIN SERPL-MCNC: 4.6 G/DL (ref 3.5–5.2)
ALBUMIN/GLOB SERPL: 2.4 G/DL
ALP SERPL-CCNC: 76 U/L (ref 39–117)
ALT SERPL-CCNC: 11 U/L (ref 1–41)
AST SERPL-CCNC: 13 U/L (ref 1–40)
BILIRUB SERPL-MCNC: 0.3 MG/DL (ref 0.2–1.2)
BUN SERPL-MCNC: 16 MG/DL (ref 8–23)
BUN/CREAT SERPL: 16.8 (ref 7–25)
CALCIUM SERPL-MCNC: 9.4 MG/DL (ref 8.6–10.5)
CHLORIDE SERPL-SCNC: 104 MMOL/L (ref 98–107)
CHOLEST SERPL-MCNC: 207 MG/DL (ref 0–200)
CO2 SERPL-SCNC: 26.7 MMOL/L (ref 22–29)
CREAT SERPL-MCNC: 0.95 MG/DL (ref 0.76–1.27)
GLOBULIN SER CALC-MCNC: 1.9 GM/DL
GLUCOSE SERPL-MCNC: 114 MG/DL (ref 65–99)
HDLC SERPL-MCNC: 65 MG/DL (ref 40–60)
LDLC SERPL CALC-MCNC: 130 MG/DL (ref 0–100)
LDLC/HDLC SERPL: 2.01 {RATIO}
POTASSIUM SERPL-SCNC: 4.3 MMOL/L (ref 3.5–5.2)
PROT SERPL-MCNC: 6.5 G/DL (ref 6–8.5)
SODIUM SERPL-SCNC: 144 MMOL/L (ref 136–145)
TRIGL SERPL-MCNC: 58 MG/DL (ref 0–150)
VLDLC SERPL CALC-MCNC: 11.6 MG/DL

## 2019-07-31 ENCOUNTER — OFFICE VISIT (OUTPATIENT)
Dept: FAMILY MEDICINE CLINIC | Facility: CLINIC | Age: 69
End: 2019-07-31

## 2019-07-31 VITALS
HEIGHT: 74 IN | TEMPERATURE: 98 F | WEIGHT: 231.8 LBS | BODY MASS INDEX: 29.75 KG/M2 | HEART RATE: 82 BPM | SYSTOLIC BLOOD PRESSURE: 150 MMHG | OXYGEN SATURATION: 99 % | DIASTOLIC BLOOD PRESSURE: 90 MMHG | RESPIRATION RATE: 16 BRPM

## 2019-07-31 DIAGNOSIS — I10 ESSENTIAL HYPERTENSION: ICD-10-CM

## 2019-07-31 DIAGNOSIS — R73.9 HYPERGLYCEMIA: ICD-10-CM

## 2019-07-31 DIAGNOSIS — E78.00 HYPERCHOLESTEROLEMIA: Primary | ICD-10-CM

## 2019-07-31 PROCEDURE — 90732 PPSV23 VACC 2 YRS+ SUBQ/IM: CPT | Performed by: INTERNAL MEDICINE

## 2019-07-31 PROCEDURE — 99214 OFFICE O/P EST MOD 30 MIN: CPT | Performed by: INTERNAL MEDICINE

## 2019-07-31 PROCEDURE — G0009 ADMIN PNEUMOCOCCAL VACCINE: HCPCS | Performed by: INTERNAL MEDICINE

## 2019-07-31 RX ORDER — LISINOPRIL AND HYDROCHLOROTHIAZIDE 20; 12.5 MG/1; MG/1
1 TABLET ORAL DAILY
Qty: 90 TABLET | Refills: 3 | Status: SHIPPED | OUTPATIENT
Start: 2019-07-31 | End: 2020-06-10 | Stop reason: SDUPTHER

## 2019-07-31 NOTE — PROGRESS NOTES
Jessica Kuhn is a 69 y.o. male. Patient is here today for follow-up on his hypertension, hyperlipidemia and hyperglycemia.  Patient also has a history of kidney stone once.  He is generally been feeling okay.  Home blood pressure readings are still running a bit high about 140 systolic at times.  Patient's had no chest pain, shortness of breath, edema or myalgias  Chief Complaint   Patient presents with   • Hypertension     HYPERCHOLESTEROLEMIA- FOLLOW UP LABS          Vitals:    07/31/19 0826   BP: 150/90   Pulse: 82   Resp: 16   Temp: 98 °F (36.7 °C)   SpO2: 99%     The following portions of the patient's history were reviewed and updated as appropriate: allergies, current medications, past family history, past medical history, past social history, past surgical history and problem list.    Past Medical History:   Diagnosis Date   • Hyperlipidemia    • Hypertension       No Known Allergies   Social History     Socioeconomic History   • Marital status:      Spouse name: Not on file   • Number of children: Not on file   • Years of education: Not on file   • Highest education level: Not on file   Tobacco Use   • Smoking status: Never Smoker   • Smokeless tobacco: Never Used   Substance and Sexual Activity   • Alcohol use: Yes   • Drug use: No        Current Outpatient Medications:   •  lisinopril-hydrochlorothiazide (PRINZIDE,ZESTORETIC) 20-12.5 MG per tablet, Take 1 tablet by mouth Daily., Disp: 90 tablet, Rfl: 3     Objective     History of Present Illness     Review of Systems   Constitutional: Negative.    HENT: Negative.    Eyes: Negative.    Respiratory: Negative.    Cardiovascular: Negative.    Gastrointestinal: Negative.    Genitourinary: Negative.    Musculoskeletal: Negative.    Skin: Negative.    Neurological: Negative.    Psychiatric/Behavioral: Negative.        Physical Exam   Constitutional: He is oriented to person, place, and time. He appears well-developed and  well-nourished.   Pleasant, cooperative no acute distress, blood pressure 145/80   HENT:   Head: Normocephalic and atraumatic.   Eyes: Conjunctivae are normal. Pupils are equal, round, and reactive to light. No scleral icterus.   Neck: Normal range of motion. Neck supple.   Cardiovascular: Normal rate, regular rhythm and normal heart sounds.   Pulmonary/Chest: Effort normal and breath sounds normal. No respiratory distress. He has no wheezes. He has no rales.   Musculoskeletal: Normal range of motion. He exhibits no edema.   Neurological: He is alert and oriented to person, place, and time.   Skin: Skin is warm and dry.   Psychiatric: He has a normal mood and affect. His behavior is normal.   Nursing note and vitals reviewed.      ASSESSMENT CMP has a sugar of 114 and is otherwise normal.  Lipid panel has total cholesterol 207, good HDL of 65 and  and is stable.  Patient does not want to go on a statin medication currently  #1-hypertension, not optimally controlled  #2-hyperlipidemia, diet controlled  #3-hyperglycemia, mild and asymptomatic     Problem List Items Addressed This Visit        Cardiovascular and Mediastinum    Hypertension    Relevant Medications    lisinopril-hydrochlorothiazide (PRINZIDE,ZESTORETIC) 20-12.5 MG per tablet    Hypercholesterolemia - Primary       Other    Hyperglycemia          PLAN the patient received a Pneumovax 23 immunization.  I am changing his lisinopril to lisinopril HCT 20-12.5 daily and would like to recheck him in about 2 months with a CMP, lipid panel and hemoglobin A1c    There are no Patient Instructions on file for this visit.  Return in about 2 months (around 9/30/2019) for with labs.

## 2019-09-27 DIAGNOSIS — Z11.59 NEED FOR HEPATITIS C SCREENING TEST: Primary | ICD-10-CM

## 2019-09-27 DIAGNOSIS — E78.00 HYPERCHOLESTEROLEMIA: ICD-10-CM

## 2019-09-27 DIAGNOSIS — R73.9 HYPERGLYCEMIA: ICD-10-CM

## 2019-09-30 ENCOUNTER — OFFICE VISIT (OUTPATIENT)
Dept: FAMILY MEDICINE CLINIC | Facility: CLINIC | Age: 69
End: 2019-09-30

## 2019-09-30 VITALS
HEIGHT: 74 IN | WEIGHT: 225 LBS | DIASTOLIC BLOOD PRESSURE: 80 MMHG | SYSTOLIC BLOOD PRESSURE: 142 MMHG | OXYGEN SATURATION: 97 % | HEART RATE: 81 BPM | TEMPERATURE: 98.6 F | BODY MASS INDEX: 28.88 KG/M2 | RESPIRATION RATE: 16 BRPM

## 2019-09-30 DIAGNOSIS — Z00.00 INITIAL MEDICARE ANNUAL WELLNESS VISIT: Primary | ICD-10-CM

## 2019-09-30 DIAGNOSIS — S40.812A ABRASION OF LEFT UPPER ARM, INITIAL ENCOUNTER: ICD-10-CM

## 2019-09-30 PROBLEM — R60.0 EDEMA OF RIGHT LOWER EXTREMITY: Status: RESOLVED | Noted: 2017-06-23 | Resolved: 2019-09-30

## 2019-09-30 PROCEDURE — 90715 TDAP VACCINE 7 YRS/> IM: CPT | Performed by: NURSE PRACTITIONER

## 2019-09-30 PROCEDURE — 90471 IMMUNIZATION ADMIN: CPT | Performed by: NURSE PRACTITIONER

## 2019-09-30 PROCEDURE — G0438 PPPS, INITIAL VISIT: HCPCS | Performed by: NURSE PRACTITIONER

## 2019-09-30 NOTE — PROGRESS NOTES
The ABCs of the Annual Wellness Visit  Initial Medicare Wellness Visit    Chief Complaint   Patient presents with   • Medicare Wellness-Initial Visit       Subjective   History of Present Illness:  Roney Kuhn is a 69 y.o. male who presents for an Initial Medicare Wellness Visit.    HEALTH RISK ASSESSMENT    Recent Hospitalizations:  No hospitalization(s) within the last year.    Current Medical Providers:  Patient Care Team:  Mikhail Wellington MD as PCP - General (Internal Medicine)    Smoking Status:  Social History     Tobacco Use   Smoking Status Never Smoker   Smokeless Tobacco Never Used       Alcohol Consumption:  Social History     Substance and Sexual Activity   Alcohol Use Yes       Depression Screen:   PHQ-2/PHQ-9 Depression Screening 9/30/2019   Little interest or pleasure in doing things 0   Feeling down, depressed, or hopeless 0   Total Score 0       Fall Risk Screen:  STEADI Fall Risk Assessment was completed, and patient is at LOW risk for falls.Assessment completed on:7/31/2019    Health Habits and Functional and Cognitive Screening:  Functional & Cognitive Status 9/30/2019   Do you have difficulty preparing food and eating? No   Do you have difficulty bathing yourself, getting dressed or grooming yourself? No   Do you have difficulty using the toilet? No   Do you have difficulty moving around from place to place? No   Do you have trouble with steps or getting out of a bed or a chair? No   Current Diet Well Balanced Diet   Dental Exam Up to date   Eye Exam Not up to date   Exercise (times per week) 1 times per week   Current Exercise Activities Include Walking   Do you need help using the phone?  No   Are you deaf or do you have serious difficulty hearing?  No   Do you need help with transportation? No   Do you need help shopping? No   Do you need help preparing meals?  No   Do you need help with housework?  No   Do you need help with laundry? No   Do you need help taking your  medications? No   Do you need help managing money? No   Do you ever drive or ride in a car without wearing a seat belt? No   Have you felt unusual stress, anger or loneliness in the last month? Yes   Who do you live with? Spouse   If you need help, do you have trouble finding someone available to you? No   Have you been bothered in the last four weeks by sexual problems? No   Do you have difficulty concentrating, remembering or making decisions? No         Does the patient have evidence of cognitive impairment? No    Asprin use counseling:Does not need ASA (and currently is not on it)    Age-appropriate Screening Schedule:  Refer to the list below for future screening recommendations based on patient's age, sex and/or medical conditions. Orders for these recommended tests are listed in the plan section. The patient has been provided with a written plan.    Health Maintenance   Topic Date Due   • TDAP/TD VACCINES (1 - Tdap) 01/12/1969   • LIPID PANEL  07/23/2020   • COLONOSCOPY  07/16/2028   • PNEUMOCOCCAL VACCINES (65+ LOW/MEDIUM RISK)  Completed   • ZOSTER VACCINE  Completed   • INFLUENZA VACCINE  Addressed          The following portions of the patient's history were reviewed and updated as appropriate: allergies, current medications, past family history, past medical history, past social history, past surgical history and problem list.    Outpatient Medications Prior to Visit   Medication Sig Dispense Refill   • lisinopril-hydrochlorothiazide (PRINZIDE,ZESTORETIC) 20-12.5 MG per tablet Take 1 tablet by mouth Daily. 90 tablet 3     No facility-administered medications prior to visit.        Patient Active Problem List   Diagnosis   • Fatigue   • Hypertension   • Hypercholesterolemia   • Dyssomnia   • Overactive bladder   • Chronic nasal congestion   • Hyperglycemia       Advanced Care Planning:  Patient has an advance directive - a copy has not been provided. Have asked the patient to send this to us to add to  "record    Review of Systems    Compared to one year ago, the patient feels his physical health is the same.  Compared to one year ago, the patient feels his mental health is the same.    Reviewed chart for potential of high risk medication in the elderly: yes  Reviewed chart for potential of harmful drug interactions in the elderly:yes    Objective         Vitals:    09/30/19 1026   BP: 142/80   Pulse: 81   Resp: 16   Temp: 98.6 °F (37 °C)   TempSrc: Oral   SpO2: 97%   Weight: 102 kg (225 lb)   Height: 188 cm (74\")   PainSc: 0-No pain       Body mass index is 28.89 kg/m².  Discussed the patient's BMI with him. The BMI discussed diet and exercise .    Physical Exam    Lab Results   Component Value Date     (H) 07/23/2019    CHLPL 207 (H) 07/23/2019    TRIG 58 07/23/2019    HDL 65 (H) 07/23/2019     (H) 07/23/2019    VLDL 11.6 07/23/2019        Assessment/Plan   Medicare Risks and Personalized Health Plan  CMS Preventative Services Quick Reference  Cardiovascular risk  Immunizations Discussed/Encouraged (specific immunizations; adacel Tdap and declined flu  )    The above risks/problems have been discussed with the patient.  Pertinent information has been shared with the patient in the After Visit Summary.  Follow up plans and orders are seen below in the Assessment/Plan Section.    Diagnoses and all orders for this visit:    1. Initial Medicare annual wellness visit (Primary)    2. Abrasion of left upper arm, initial encounter   -     Td (adult) Vaccine Not Adsorbed    for abrasion to arm   Follow Up:  Return for Next scheduled follow up.     An After Visit Summary and PPPS were given to the patient.           "

## 2019-10-01 LAB
ALBUMIN SERPL-MCNC: 4.3 G/DL (ref 3.5–5.2)
ALBUMIN/GLOB SERPL: 2 G/DL
ALP SERPL-CCNC: 83 U/L (ref 39–117)
ALT SERPL-CCNC: 13 U/L (ref 1–41)
AST SERPL-CCNC: 16 U/L (ref 1–40)
BILIRUB SERPL-MCNC: 0.3 MG/DL (ref 0.2–1.2)
BUN SERPL-MCNC: 16 MG/DL (ref 8–23)
BUN/CREAT SERPL: 17.2 (ref 7–25)
CALCIUM SERPL-MCNC: 9.6 MG/DL (ref 8.6–10.5)
CHLORIDE SERPL-SCNC: 101 MMOL/L (ref 98–107)
CHOLEST SERPL-MCNC: 208 MG/DL (ref 0–200)
CO2 SERPL-SCNC: 28.8 MMOL/L (ref 22–29)
CREAT SERPL-MCNC: 0.93 MG/DL (ref 0.76–1.27)
GLOBULIN SER CALC-MCNC: 2.2 GM/DL
GLUCOSE SERPL-MCNC: 109 MG/DL (ref 65–99)
HBA1C MFR BLD: 5.6 % (ref 4.8–5.6)
HCV AB S/CO SERPL IA: <0.1 S/CO RATIO (ref 0–0.9)
HCV AB SERPL QL IA: NORMAL
HDLC SERPL-MCNC: 69 MG/DL (ref 40–60)
LDLC SERPL CALC-MCNC: 116 MG/DL (ref 0–100)
LDLC/HDLC SERPL: 1.68 {RATIO}
Lab: NORMAL
POTASSIUM SERPL-SCNC: 4.4 MMOL/L (ref 3.5–5.2)
PROT SERPL-MCNC: 6.5 G/DL (ref 6–8.5)
SODIUM SERPL-SCNC: 143 MMOL/L (ref 136–145)
TRIGL SERPL-MCNC: 116 MG/DL (ref 0–150)
VLDLC SERPL CALC-MCNC: 23.2 MG/DL

## 2019-10-03 ENCOUNTER — OFFICE VISIT (OUTPATIENT)
Dept: FAMILY MEDICINE CLINIC | Facility: CLINIC | Age: 69
End: 2019-10-03

## 2019-10-03 VITALS
OXYGEN SATURATION: 97 % | TEMPERATURE: 98.5 F | BODY MASS INDEX: 28.9 KG/M2 | RESPIRATION RATE: 16 BRPM | WEIGHT: 225.2 LBS | HEART RATE: 82 BPM | DIASTOLIC BLOOD PRESSURE: 80 MMHG | SYSTOLIC BLOOD PRESSURE: 150 MMHG | HEIGHT: 74 IN

## 2019-10-03 DIAGNOSIS — I10 ESSENTIAL HYPERTENSION: ICD-10-CM

## 2019-10-03 DIAGNOSIS — E78.00 HYPERCHOLESTEROLEMIA: Primary | ICD-10-CM

## 2019-10-03 DIAGNOSIS — R73.9 HYPERGLYCEMIA: ICD-10-CM

## 2019-10-03 PROCEDURE — 99214 OFFICE O/P EST MOD 30 MIN: CPT | Performed by: INTERNAL MEDICINE

## 2019-10-03 RX ORDER — ROSUVASTATIN CALCIUM 5 MG/1
5 TABLET, COATED ORAL DAILY
Qty: 30 TABLET | Refills: 3 | Status: SHIPPED | OUTPATIENT
Start: 2019-10-03 | End: 2019-11-05 | Stop reason: SDUPTHER

## 2019-10-03 NOTE — PROGRESS NOTES
Jessica Kuhn is a 69 y.o. male. Patient is here today for follow-up on his hyperlipidemia, hypertension and hyperglycemia.  He is generally feeling okay and has no acute complaints.  Home blood pressure readings have been variable but certainly not extremely high.  He has had no acute symptoms  Chief Complaint   Patient presents with   • Hypertension     HYPERCHOLESTEROLEMIA- PT HERE FOR FOLLOW UP LABS          Vitals:    10/03/19 0950   BP: 150/80   Pulse: 82   Resp: 16   Temp: 98.5 °F (36.9 °C)   SpO2: 97%     The following portions of the patient's history were reviewed and updated as appropriate: allergies, current medications, past family history, past medical history, past social history, past surgical history and problem list.    Past Medical History:   Diagnosis Date   • Hyperlipidemia    • Hypertension       No Known Allergies   Social History     Socioeconomic History   • Marital status:      Spouse name: Not on file   • Number of children: Not on file   • Years of education: Not on file   • Highest education level: Not on file   Tobacco Use   • Smoking status: Never Smoker   • Smokeless tobacco: Never Used   Substance and Sexual Activity   • Alcohol use: Yes   • Drug use: No        Current Outpatient Medications:   •  lisinopril-hydrochlorothiazide (PRINZIDE,ZESTORETIC) 20-12.5 MG per tablet, Take 1 tablet by mouth Daily., Disp: 90 tablet, Rfl: 3  •  rosuvastatin (CRESTOR) 5 MG tablet, Take 1 tablet by mouth Daily., Disp: 30 tablet, Rfl: 3     Objective     History of Present Illness     Review of Systems   Constitutional: Negative.    HENT: Negative.    Eyes: Negative.    Respiratory: Negative.    Cardiovascular: Negative.    Gastrointestinal: Negative.    Genitourinary: Negative.    Musculoskeletal: Negative.    Skin: Negative.    Neurological: Negative.    Psychiatric/Behavioral: Negative.        Physical Exam   Constitutional: He is oriented to person, place, and time. He  appears well-developed and well-nourished.   Pleasant, cooperative no acute distress, blood pressure 130/80 by me in the right arm   HENT:   Head: Normocephalic and atraumatic.   Eyes: Conjunctivae are normal. Pupils are equal, round, and reactive to light. No scleral icterus.   Neck: Normal range of motion. Neck supple.   Cardiovascular: Normal rate, regular rhythm and normal heart sounds.   Pulmonary/Chest: Effort normal and breath sounds normal. No respiratory distress. He has no wheezes. He has no rales.   Musculoskeletal: Normal range of motion. He exhibits no edema.   Neurological: He is alert and oriented to person, place, and time.   Skin: Skin is warm and dry.   Psychiatric: He has a normal mood and affect. His behavior is normal.   Nursing note and vitals reviewed.      ASSESSMENT CMP has an elevated but stable sugar of 109 and is otherwise normal.  Hemoglobin A1c was normal at 5.6.  Hepatitis C screen was negative.  Lipid panel is stable but elevated with a total cholesterol of 208, HDL of 69 and .  #1-hypertension, probably reasonable blood pressure today  #2-hyperlipidemia, asymptomatic and stable  #3-hyperglycemia, mild and asymptomatic with normal hemoglobin A1c     Problem List Items Addressed This Visit        Cardiovascular and Mediastinum    Hypertension    Hypercholesterolemia - Primary    Relevant Medications    rosuvastatin (CRESTOR) 5 MG tablet       Other    Hyperglycemia          PLAN the patient will continue the lisinopril HCT and I am going to add in Crestor 5 mg daily.  I want to recheck him in 1 month with a CMP and lipid panel    There are no Patient Instructions on file for this visit.  Return in about 1 month (around 11/3/2019) for with labs.

## 2019-10-26 DIAGNOSIS — E78.00 HYPERCHOLESTEROLEMIA: Primary | ICD-10-CM

## 2019-10-29 LAB
ALBUMIN SERPL-MCNC: 4.7 G/DL (ref 3.5–5.2)
ALBUMIN/GLOB SERPL: 2.1 G/DL
ALP SERPL-CCNC: 72 U/L (ref 39–117)
ALT SERPL-CCNC: 15 U/L (ref 1–41)
AST SERPL-CCNC: 18 U/L (ref 1–40)
BILIRUB SERPL-MCNC: 0.3 MG/DL (ref 0.2–1.2)
BUN SERPL-MCNC: 18 MG/DL (ref 8–23)
BUN/CREAT SERPL: 16.8 (ref 7–25)
CALCIUM SERPL-MCNC: 9.5 MG/DL (ref 8.6–10.5)
CHLORIDE SERPL-SCNC: 100 MMOL/L (ref 98–107)
CHOLEST SERPL-MCNC: 184 MG/DL (ref 0–200)
CO2 SERPL-SCNC: 28.9 MMOL/L (ref 22–29)
CREAT SERPL-MCNC: 1.07 MG/DL (ref 0.76–1.27)
GLOBULIN SER CALC-MCNC: 2.2 GM/DL
GLUCOSE SERPL-MCNC: 106 MG/DL (ref 65–99)
HDLC SERPL-MCNC: 75 MG/DL (ref 40–60)
LDLC SERPL CALC-MCNC: 90 MG/DL (ref 0–100)
LDLC/HDLC SERPL: 1.2 {RATIO}
POTASSIUM SERPL-SCNC: 3.9 MMOL/L (ref 3.5–5.2)
PROT SERPL-MCNC: 6.9 G/DL (ref 6–8.5)
SODIUM SERPL-SCNC: 142 MMOL/L (ref 136–145)
TRIGL SERPL-MCNC: 96 MG/DL (ref 0–150)
VLDLC SERPL CALC-MCNC: 19.2 MG/DL

## 2019-11-05 ENCOUNTER — OFFICE VISIT (OUTPATIENT)
Dept: FAMILY MEDICINE CLINIC | Facility: CLINIC | Age: 69
End: 2019-11-05

## 2019-11-05 VITALS
RESPIRATION RATE: 14 BRPM | TEMPERATURE: 98.5 F | OXYGEN SATURATION: 96 % | DIASTOLIC BLOOD PRESSURE: 84 MMHG | BODY MASS INDEX: 29.34 KG/M2 | HEART RATE: 80 BPM | WEIGHT: 228.6 LBS | HEIGHT: 74 IN | SYSTOLIC BLOOD PRESSURE: 138 MMHG

## 2019-11-05 DIAGNOSIS — I10 ESSENTIAL HYPERTENSION: Primary | ICD-10-CM

## 2019-11-05 DIAGNOSIS — R73.9 HYPERGLYCEMIA: ICD-10-CM

## 2019-11-05 DIAGNOSIS — E78.00 HYPERCHOLESTEROLEMIA: ICD-10-CM

## 2019-11-05 DIAGNOSIS — Z87.442 HISTORY OF NEPHROLITHIASIS: ICD-10-CM

## 2019-11-05 DIAGNOSIS — R10.9 ACUTE FLANK PAIN: ICD-10-CM

## 2019-11-05 LAB
BILIRUB BLD-MCNC: NEGATIVE MG/DL
CLARITY, POC: CLEAR
COLOR UR: ABNORMAL
GLUCOSE UR STRIP-MCNC: NEGATIVE MG/DL
KETONES UR QL: NEGATIVE
LEUKOCYTE EST, POC: NEGATIVE
NITRITE UR-MCNC: POSITIVE MG/ML
PH UR: 6.5 [PH] (ref 5–8)
PROT UR STRIP-MCNC: ABNORMAL MG/DL
RBC # UR STRIP: NEGATIVE /UL
SP GR UR: 1.02 (ref 1–1.03)
UROBILINOGEN UR QL: NORMAL

## 2019-11-05 PROCEDURE — 99214 OFFICE O/P EST MOD 30 MIN: CPT | Performed by: INTERNAL MEDICINE

## 2019-11-05 PROCEDURE — 81003 URINALYSIS AUTO W/O SCOPE: CPT | Performed by: INTERNAL MEDICINE

## 2019-11-05 RX ORDER — ROSUVASTATIN CALCIUM 5 MG/1
5 TABLET, COATED ORAL DAILY
Qty: 90 TABLET | Refills: 3 | Status: SHIPPED | OUTPATIENT
Start: 2019-11-05 | End: 2020-11-19

## 2019-11-05 NOTE — PROGRESS NOTES
Jessica Kuhn is a 69 y.o. male. Patient is here today for follow-up on his hyperlipidemia.  At the last visit I started him on some Crestor.  He is been tolerating that without any apparent problems.  He did do some yard work and is developed some left CVA area pain.  He has a history of kidney stones and he says it is in the same area.  Chief Complaint   Patient presents with   • Hypertension   • Hyperlipidemia          Vitals:    11/05/19 0924   BP: 138/84   Pulse: 80   Resp: 14   Temp: 98.5 °F (36.9 °C)   SpO2: 96%     Body mass index is 29.35 kg/m².  The following portions of the patient's history were reviewed and updated as appropriate: allergies, current medications, past family history, past medical history, past social history, past surgical history and problem list.    Past Medical History:   Diagnosis Date   • Hyperlipidemia    • Hypertension       No Known Allergies   Social History     Socioeconomic History   • Marital status:      Spouse name: Not on file   • Number of children: Not on file   • Years of education: Not on file   • Highest education level: Not on file   Tobacco Use   • Smoking status: Never Smoker   • Smokeless tobacco: Never Used   Substance and Sexual Activity   • Alcohol use: Yes   • Drug use: No        Current Outpatient Medications:   •  lisinopril-hydrochlorothiazide (PRINZIDE,ZESTORETIC) 20-12.5 MG per tablet, Take 1 tablet by mouth Daily., Disp: 90 tablet, Rfl: 3  •  rosuvastatin (CRESTOR) 5 MG tablet, Take 1 tablet by mouth Daily., Disp: 90 tablet, Rfl: 3     Objective     History of Present Illness     Review of Systems   Constitutional: Negative.    HENT: Negative.    Eyes: Negative.    Respiratory: Negative.    Cardiovascular: Negative.    Gastrointestinal: Negative.    Genitourinary: Positive for flank pain.   Musculoskeletal: Positive for back pain.   Skin: Negative.    Neurological: Negative.    Psychiatric/Behavioral: Negative.        Physical  Exam   Constitutional: He is oriented to person, place, and time. He appears well-developed and well-nourished.   Pleasant, cooperative no acute distress, blood pressure 124/70   HENT:   Head: Normocephalic and atraumatic.   Eyes: Conjunctivae are normal. Pupils are equal, round, and reactive to light. No scleral icterus.   Neck: Normal range of motion. Neck supple.   Cardiovascular: Normal rate, regular rhythm and normal heart sounds.   Pulmonary/Chest: Effort normal and breath sounds normal. No respiratory distress. He has no wheezes. He has no rales.   Musculoskeletal: Normal range of motion. He exhibits no tenderness.   Patient is having some discomfort in his left back in the CVA area without any significant tenderness   Neurological: He is alert and oriented to person, place, and time.   Skin: Skin is warm and dry.   Psychiatric: He has a normal mood and affect. His behavior is normal.   Nursing note and vitals reviewed.      ASSESSMENT lipid panel has improved and has a total cholesterol of 184, and HDL of 75 and LDL of 90.  CMP has mildly elevated but stable sugar of 106 and otherwise is completely normal.  Urinalysis shows no blood in the urine.  #1-hyperlipidemia, controlled by Crestor 5 mg  #2-hypertension, well controlled  #3-hyperglycemia, mild, asymptomatic and stable  #4-left CVA area pain, most likely muscular     Problem List Items Addressed This Visit        Cardiovascular and Mediastinum    Hypertension - Primary    Hypercholesterolemia    Relevant Medications    rosuvastatin (CRESTOR) 5 MG tablet      Other Visit Diagnoses     Acute flank pain        Relevant Orders    POC Urinalysis Dipstick, Automated          PLAN the patient will continue current medicines as now.  He can try some Advil or Aleve for the back pain and some heat as well.  Obviously it gets more severe, he should be seen at the emergency room or follow-up with us.  He will drink plenty of fluids.  I plan on rechecking him in 6  months with a CBC, CMP, lipid panel and TSH and PSA    There are no Patient Instructions on file for this visit.  No Follow-up on file.

## 2020-05-11 ENCOUNTER — TELEPHONE (OUTPATIENT)
Dept: FAMILY MEDICINE CLINIC | Facility: CLINIC | Age: 70
End: 2020-05-11

## 2020-06-02 DIAGNOSIS — I10 ESSENTIAL HYPERTENSION: Primary | ICD-10-CM

## 2020-06-02 DIAGNOSIS — Z13.228 ENCOUNTER FOR SCREENING FOR METABOLIC DISORDER: ICD-10-CM

## 2020-06-02 DIAGNOSIS — E78.00 HYPERCHOLESTEROLEMIA: ICD-10-CM

## 2020-06-02 DIAGNOSIS — Z12.5 SPECIAL SCREENING FOR MALIGNANT NEOPLASM OF PROSTATE: ICD-10-CM

## 2020-06-04 ENCOUNTER — RESULTS ENCOUNTER (OUTPATIENT)
Dept: FAMILY MEDICINE CLINIC | Facility: CLINIC | Age: 70
End: 2020-06-04

## 2020-06-04 DIAGNOSIS — Z13.228 ENCOUNTER FOR SCREENING FOR METABOLIC DISORDER: ICD-10-CM

## 2020-06-04 DIAGNOSIS — Z12.5 SPECIAL SCREENING FOR MALIGNANT NEOPLASM OF PROSTATE: ICD-10-CM

## 2020-06-04 DIAGNOSIS — E78.00 HYPERCHOLESTEROLEMIA: ICD-10-CM

## 2020-06-04 DIAGNOSIS — I10 ESSENTIAL HYPERTENSION: ICD-10-CM

## 2020-06-05 LAB
ALBUMIN SERPL-MCNC: 4.5 G/DL (ref 3.7–4.7)
ALBUMIN/GLOB SERPL: 2.3 {RATIO} (ref 1.2–2.2)
ALP SERPL-CCNC: 61 IU/L (ref 39–117)
ALT SERPL-CCNC: 16 IU/L (ref 0–44)
AST SERPL-CCNC: 20 IU/L (ref 0–40)
BASOPHILS # BLD AUTO: 0 X10E3/UL (ref 0–0.2)
BASOPHILS NFR BLD AUTO: 1 %
BILIRUB SERPL-MCNC: 0.5 MG/DL (ref 0–1.2)
BUN SERPL-MCNC: 22 MG/DL (ref 8–27)
BUN/CREAT SERPL: 22 (ref 10–24)
CALCIUM SERPL-MCNC: 9.8 MG/DL (ref 8.6–10.2)
CHLORIDE SERPL-SCNC: 100 MMOL/L (ref 96–106)
CHOLEST SERPL-MCNC: 185 MG/DL (ref 100–199)
CO2 SERPL-SCNC: 26 MMOL/L (ref 20–29)
CREAT SERPL-MCNC: 0.98 MG/DL (ref 0.76–1.27)
EOSINOPHIL # BLD AUTO: 0.1 X10E3/UL (ref 0–0.4)
EOSINOPHIL NFR BLD AUTO: 2 %
ERYTHROCYTE [DISTWIDTH] IN BLOOD BY AUTOMATED COUNT: 13.1 % (ref 11.6–15.4)
GLOBULIN SER CALC-MCNC: 2 G/DL (ref 1.5–4.5)
GLUCOSE SERPL-MCNC: 103 MG/DL (ref 65–99)
HCT VFR BLD AUTO: 44.6 % (ref 37.5–51)
HDLC SERPL-MCNC: 84 MG/DL
HGB BLD-MCNC: 15.2 G/DL (ref 13–17.7)
IMM GRANULOCYTES # BLD AUTO: 0 X10E3/UL (ref 0–0.1)
IMM GRANULOCYTES NFR BLD AUTO: 0 %
LDLC SERPL CALC-MCNC: 83 MG/DL (ref 0–99)
LDLC/HDLC SERPL: 1 RATIO (ref 0–3.6)
LYMPHOCYTES # BLD AUTO: 1.1 X10E3/UL (ref 0.7–3.1)
LYMPHOCYTES NFR BLD AUTO: 21 %
MCH RBC QN AUTO: 31.9 PG (ref 26.6–33)
MCHC RBC AUTO-ENTMCNC: 34.1 G/DL (ref 31.5–35.7)
MCV RBC AUTO: 94 FL (ref 79–97)
MONOCYTES # BLD AUTO: 0.5 X10E3/UL (ref 0.1–0.9)
MONOCYTES NFR BLD AUTO: 10 %
NEUTROPHILS # BLD AUTO: 3.4 X10E3/UL (ref 1.4–7)
NEUTROPHILS NFR BLD AUTO: 66 %
PLATELET # BLD AUTO: 263 X10E3/UL (ref 150–450)
POTASSIUM SERPL-SCNC: 4.3 MMOL/L (ref 3.5–5.2)
PROT SERPL-MCNC: 6.5 G/DL (ref 6–8.5)
PSA SERPL-MCNC: 1.9 NG/ML (ref 0–4)
RBC # BLD AUTO: 4.76 X10E6/UL (ref 4.14–5.8)
SODIUM SERPL-SCNC: 142 MMOL/L (ref 134–144)
TRIGL SERPL-MCNC: 88 MG/DL (ref 0–149)
TSH SERPL DL<=0.005 MIU/L-ACNC: 2.49 UIU/ML (ref 0.45–4.5)
VLDLC SERPL CALC-MCNC: 18 MG/DL (ref 5–40)
WBC # BLD AUTO: 5.1 X10E3/UL (ref 3.4–10.8)

## 2020-06-10 ENCOUNTER — OFFICE VISIT (OUTPATIENT)
Dept: FAMILY MEDICINE CLINIC | Facility: CLINIC | Age: 70
End: 2020-06-10

## 2020-06-10 VITALS
WEIGHT: 228 LBS | OXYGEN SATURATION: 98 % | RESPIRATION RATE: 16 BRPM | HEART RATE: 92 BPM | HEIGHT: 74 IN | TEMPERATURE: 97.3 F | DIASTOLIC BLOOD PRESSURE: 80 MMHG | SYSTOLIC BLOOD PRESSURE: 140 MMHG | BODY MASS INDEX: 29.26 KG/M2

## 2020-06-10 DIAGNOSIS — R73.9 HYPERGLYCEMIA: ICD-10-CM

## 2020-06-10 DIAGNOSIS — I10 ESSENTIAL HYPERTENSION: ICD-10-CM

## 2020-06-10 DIAGNOSIS — E78.00 HYPERCHOLESTEROLEMIA: Primary | ICD-10-CM

## 2020-06-10 DIAGNOSIS — M25.512 ACUTE PAIN OF LEFT SHOULDER: ICD-10-CM

## 2020-06-10 PROCEDURE — 99214 OFFICE O/P EST MOD 30 MIN: CPT | Performed by: INTERNAL MEDICINE

## 2020-06-10 RX ORDER — LISINOPRIL AND HYDROCHLOROTHIAZIDE 20; 12.5 MG/1; MG/1
1 TABLET ORAL DAILY
Qty: 90 TABLET | Refills: 3 | Status: SHIPPED | OUTPATIENT
Start: 2020-06-10 | End: 2021-06-16

## 2020-06-10 NOTE — PROGRESS NOTES
Jessica Kuhn is a 70 y.o. male. Patient is here today for follow-up on his hypertension, hyperlipidemia, hyperglycemia.  He is generally been stable and has had good blood pressure readings when he donates platelets.  His only acute complaint is of a fall while playing badminton injuring his left shoulder.  It hurts with abduction and has been reasonably controlled with some over-the-counter ibuprofen.  The injury happened about 2 weeks ago.  Chief Complaint   Patient presents with   • Hyperlipidemia   • Hypertension          Vitals:    06/10/20 1323   BP: 140/80   Pulse: 92   Resp: 16   Temp: 97.3 °F (36.3 °C)   SpO2: 98%     Body mass index is 29.27 kg/m².  The following portions of the patient's history were reviewed and updated as appropriate: allergies, current medications, past family history, past medical history, past social history, past surgical history and problem list.    Past Medical History:   Diagnosis Date   • Hyperlipidemia    • Hypertension       No Known Allergies   Social History     Socioeconomic History   • Marital status:      Spouse name: Not on file   • Number of children: Not on file   • Years of education: Not on file   • Highest education level: Not on file   Tobacco Use   • Smoking status: Never Smoker   • Smokeless tobacco: Never Used   Substance and Sexual Activity   • Alcohol use: Yes   • Drug use: No   • Sexual activity: Defer        Current Outpatient Medications:   •  lisinopril-hydrochlorothiazide (PRINZIDE,ZESTORETIC) 20-12.5 MG per tablet, Take 1 tablet by mouth Daily., Disp: 90 tablet, Rfl: 3  •  rosuvastatin (CRESTOR) 5 MG tablet, Take 1 tablet by mouth Daily., Disp: 90 tablet, Rfl: 3     Objective     History of Present Illness     Review of Systems   Constitutional: Negative.    HENT: Negative.    Respiratory: Negative.    Cardiovascular: Negative.    Gastrointestinal: Negative.    Genitourinary: Negative.    Musculoskeletal: Negative.    Skin:  Negative.    Neurological: Negative.    Psychiatric/Behavioral: Negative.        Physical Exam   Constitutional: He is oriented to person, place, and time. He appears well-developed and well-nourished.   Pleasant, cooperative no acute distress, blood pressure 130/75   HENT:   Head: Normocephalic and atraumatic.   Eyes: Conjunctivae are normal. No scleral icterus.   Neck: Normal range of motion. Neck supple.   Cardiovascular: Normal rate, regular rhythm and normal heart sounds.   Pulmonary/Chest: Effort normal and breath sounds normal. No respiratory distress. He has no wheezes. He has no rales.   Musculoskeletal: Normal range of motion.   There is some pain with abduction for about 90 degrees but posterior movement is pretty good.  There is no real tenderness to palpation.   Neurological: He is alert and oriented to person, place, and time.   Skin: Skin is warm and dry.   Psychiatric: He has a normal mood and affect. His behavior is normal.   Nursing note and vitals reviewed.      ASSESSMENT CBC was completely normal.  CMP had an elevated but stable sugar of 103 and was otherwise normal and lipid panel is good with a total cholesterol 185, HDL of 84 and LDL 83.  TSH was quite normal.  PSA was normal at 1.9.  #1-hypertension, controlled on medication  #2-hyperlipidemia, controlled on medication  #3-hyperglycemia, mild and asymptomatic  #4-left shoulder injury currently seems to be just mild muscular     Problem List Items Addressed This Visit        Cardiovascular and Mediastinum    Hypertension    Relevant Medications    lisinopril-hydrochlorothiazide (PRINZIDE,ZESTORETIC) 20-12.5 MG per tablet    Hypercholesterolemia - Primary       Other    Hyperglycemia          PLAN the patient does not wish to see orthopedics currently and can continue with the ibuprofen as needed.  He will continue other medicines as now and I will recheck him in 6 months with a CMP, lipid panel, hemoglobin A1c    There are no Patient  Instructions on file for this visit.  Return in about 6 months (around 12/10/2020) for with labs.

## 2020-11-19 RX ORDER — ROSUVASTATIN CALCIUM 5 MG/1
TABLET, COATED ORAL
Qty: 90 TABLET | Refills: 2 | Status: SHIPPED | OUTPATIENT
Start: 2020-11-19 | End: 2021-09-20

## 2020-12-08 DIAGNOSIS — R73.9 HYPERGLYCEMIA: ICD-10-CM

## 2020-12-08 DIAGNOSIS — E78.00 HYPERCHOLESTEROLEMIA: ICD-10-CM

## 2020-12-14 ENCOUNTER — RESULTS ENCOUNTER (OUTPATIENT)
Dept: FAMILY MEDICINE CLINIC | Facility: CLINIC | Age: 70
End: 2020-12-14

## 2020-12-14 DIAGNOSIS — E78.00 HYPERCHOLESTEROLEMIA: ICD-10-CM

## 2020-12-14 DIAGNOSIS — R73.9 HYPERGLYCEMIA: ICD-10-CM

## 2020-12-15 LAB
ALBUMIN SERPL-MCNC: 4.3 G/DL (ref 3.7–4.7)
ALBUMIN/GLOB SERPL: 2.2 {RATIO} (ref 1.2–2.2)
ALP SERPL-CCNC: 77 IU/L (ref 39–117)
ALT SERPL-CCNC: 20 IU/L (ref 0–44)
AST SERPL-CCNC: 22 IU/L (ref 0–40)
BILIRUB SERPL-MCNC: 0.5 MG/DL (ref 0–1.2)
BUN SERPL-MCNC: 16 MG/DL (ref 8–27)
BUN/CREAT SERPL: 19 (ref 10–24)
CALCIUM SERPL-MCNC: 9.4 MG/DL (ref 8.6–10.2)
CHLORIDE SERPL-SCNC: 103 MMOL/L (ref 96–106)
CHOLEST SERPL-MCNC: 173 MG/DL (ref 100–199)
CO2 SERPL-SCNC: 27 MMOL/L (ref 20–29)
CREAT SERPL-MCNC: 0.86 MG/DL (ref 0.76–1.27)
GLOBULIN SER CALC-MCNC: 2 G/DL (ref 1.5–4.5)
GLUCOSE SERPL-MCNC: 101 MG/DL (ref 65–99)
HBA1C MFR BLD: 5.5 % (ref 4.8–5.6)
HDLC SERPL-MCNC: 78 MG/DL
LDLC SERPL CALC-MCNC: 83 MG/DL (ref 0–99)
LDLC/HDLC SERPL: 1.1 RATIO (ref 0–3.6)
POTASSIUM SERPL-SCNC: 4.6 MMOL/L (ref 3.5–5.2)
PROT SERPL-MCNC: 6.3 G/DL (ref 6–8.5)
SODIUM SERPL-SCNC: 143 MMOL/L (ref 134–144)
TRIGL SERPL-MCNC: 59 MG/DL (ref 0–149)
VLDLC SERPL CALC-MCNC: 12 MG/DL (ref 5–40)

## 2020-12-18 ENCOUNTER — OFFICE VISIT (OUTPATIENT)
Dept: FAMILY MEDICINE CLINIC | Facility: CLINIC | Age: 70
End: 2020-12-18

## 2020-12-18 DIAGNOSIS — R73.9 HYPERGLYCEMIA: ICD-10-CM

## 2020-12-18 DIAGNOSIS — U07.1 COVID-19 VIRUS INFECTION: ICD-10-CM

## 2020-12-18 DIAGNOSIS — I10 ESSENTIAL HYPERTENSION: ICD-10-CM

## 2020-12-18 DIAGNOSIS — E78.00 HYPERCHOLESTEROLEMIA: Primary | ICD-10-CM

## 2020-12-18 PROCEDURE — 99443 PR PHYS/QHP TELEPHONE EVALUATION 21-30 MIN: CPT | Performed by: INTERNAL MEDICINE

## 2020-12-18 NOTE — PROGRESS NOTES
Jessica Kuhn is a 70 y.o. male. Patient is here today for a telephone visit due to the COVID-19 pandemic.  Patient was specifically asked about a telephone visit and consents to a telephone visit.  He currently is feeling okay but he is tested positive for COVID-19 virus.  He is in self quarantine and his wife has the virus as well.  Otherwise he has been stable and has had no chest pain, shortness of breath, edema or myalgias and has been tolerating medications.     No chief complaint on file.         There were no vitals filed for this visit.  There is no height or weight on file to calculate BMI.  The following portions of the patient's history were reviewed and updated as appropriate: allergies, current medications, past family history, past medical history, past social history, past surgical history and problem list.    Past Medical History:   Diagnosis Date   • Hyperlipidemia    • Hypertension       No Known Allergies   Social History     Socioeconomic History   • Marital status:      Spouse name: Not on file   • Number of children: Not on file   • Years of education: Not on file   • Highest education level: Not on file   Tobacco Use   • Smoking status: Never Smoker   • Smokeless tobacco: Never Used   Substance and Sexual Activity   • Alcohol use: Yes   • Drug use: No   • Sexual activity: Defer        Current Outpatient Medications:   •  lisinopril-hydrochlorothiazide (PRINZIDE,ZESTORETIC) 20-12.5 MG per tablet, Take 1 tablet by mouth Daily., Disp: 90 tablet, Rfl: 3  •  rosuvastatin (CRESTOR) 5 MG tablet, TAKE ONE TABLET BY MOUTH DAILY, Disp: 90 tablet, Rfl: 2     Objective     History of Present Illness     Review of Systems   Constitutional: Negative.    HENT: Negative.    Respiratory: Negative.    Cardiovascular: Negative.    Gastrointestinal: Negative.    Genitourinary: Negative.    Musculoskeletal: Negative.    Skin: Negative.    Neurological: Negative.     Psychiatric/Behavioral: Negative.        Physical Exam    ASSESSMENT CMP had a stable sugar of 101 and was otherwise normal and hemoglobin A1c remains normal at 5.5.  Lipid panel is well controlled with total cholesterol 173, HDL 78 and LDL 83  #1-COVID-19 virus infection, currently stable and generally asymptomatic in quarantine  #2-hypertension, controlled on medication  #3-hyperlipidemia, controlled on medication  #4-hyperglycemia, asymptomatic with normal hemoglobin A1c     Problems Addressed this Visit        Cardiovascular and Mediastinum    Hypertension    Hypercholesterolemia - Primary       Other    Hyperglycemia    COVID-19 virus infection     December 16, 2020           Diagnoses       Codes Comments    Hypercholesterolemia    -  Primary ICD-10-CM: E78.00  ICD-9-CM: 272.0     Essential hypertension     ICD-10-CM: I10  ICD-9-CM: 401.9     Hyperglycemia     ICD-10-CM: R73.9  ICD-9-CM: 790.29     COVID-19 virus infection     ICD-10-CM: U07.1  ICD-9-CM: 079.89           PLAN the patient is tested positive for COVID-19 virus and is in self quarantine but currently asymptomatic.  He will continue current medicines as now and I plan on rechecking him in 6 months with a CBC, CMP, lipid panel, hemoglobin A1c, TSH and PSA  This was a telephone visit due to the COVID-19 pandemic, total time spent was 22 minutes    There are no Patient Instructions on file for this visit.  Return in about 6 months (around 6/18/2021) for with labs.

## 2021-03-16 ENCOUNTER — IMMUNIZATION (OUTPATIENT)
Dept: VACCINE CLINIC | Facility: HOSPITAL | Age: 71
End: 2021-03-16

## 2021-03-16 PROCEDURE — 91300 HC SARSCOV02 VAC 30MCG/0.3ML IM: CPT | Performed by: INTERNAL MEDICINE

## 2021-03-16 PROCEDURE — 0001A: CPT | Performed by: INTERNAL MEDICINE

## 2021-04-06 ENCOUNTER — IMMUNIZATION (OUTPATIENT)
Dept: VACCINE CLINIC | Facility: HOSPITAL | Age: 71
End: 2021-04-06

## 2021-04-06 PROCEDURE — 0002A: CPT | Performed by: INTERNAL MEDICINE

## 2021-04-06 PROCEDURE — 91300 HC SARSCOV02 VAC 30MCG/0.3ML IM: CPT | Performed by: INTERNAL MEDICINE

## 2021-06-09 DIAGNOSIS — R73.9 HYPERGLYCEMIA: ICD-10-CM

## 2021-06-09 DIAGNOSIS — Z12.5 SPECIAL SCREENING FOR MALIGNANT NEOPLASM OF PROSTATE: ICD-10-CM

## 2021-06-09 DIAGNOSIS — E78.00 HYPERCHOLESTEROLEMIA: ICD-10-CM

## 2021-06-12 LAB
ALBUMIN SERPL-MCNC: 4.3 G/DL (ref 3.5–5.2)
ALBUMIN/GLOB SERPL: 2.4 G/DL
ALP SERPL-CCNC: 57 U/L (ref 39–117)
ALT SERPL-CCNC: 12 U/L (ref 1–41)
AST SERPL-CCNC: 14 U/L (ref 1–40)
BASOPHILS # BLD AUTO: 0.02 10*3/MM3 (ref 0–0.2)
BASOPHILS NFR BLD AUTO: 0.5 % (ref 0–1.5)
BILIRUB SERPL-MCNC: 0.4 MG/DL (ref 0–1.2)
BUN SERPL-MCNC: 16 MG/DL (ref 8–23)
BUN/CREAT SERPL: 17.6 (ref 7–25)
CALCIUM SERPL-MCNC: 9.4 MG/DL (ref 8.6–10.5)
CHLORIDE SERPL-SCNC: 104 MMOL/L (ref 98–107)
CHOLEST SERPL-MCNC: 159 MG/DL (ref 0–200)
CO2 SERPL-SCNC: 28 MMOL/L (ref 22–29)
CREAT SERPL-MCNC: 0.91 MG/DL (ref 0.76–1.27)
EOSINOPHIL # BLD AUTO: 0.18 10*3/MM3 (ref 0–0.4)
EOSINOPHIL NFR BLD AUTO: 4.1 % (ref 0.3–6.2)
ERYTHROCYTE [DISTWIDTH] IN BLOOD BY AUTOMATED COUNT: 12.6 % (ref 12.3–15.4)
GLOBULIN SER CALC-MCNC: 1.8 GM/DL
GLUCOSE SERPL-MCNC: 98 MG/DL (ref 65–99)
HBA1C MFR BLD: 5.6 % (ref 4.8–5.6)
HCT VFR BLD AUTO: 44.4 % (ref 37.5–51)
HDLC SERPL-MCNC: 72 MG/DL (ref 40–60)
HGB BLD-MCNC: 15.3 G/DL (ref 13–17.7)
IMM GRANULOCYTES # BLD AUTO: 0.01 10*3/MM3 (ref 0–0.05)
IMM GRANULOCYTES NFR BLD AUTO: 0.2 % (ref 0–0.5)
LDLC SERPL CALC-MCNC: 72 MG/DL (ref 0–100)
LDLC/HDLC SERPL: 0.98 {RATIO}
LYMPHOCYTES # BLD AUTO: 0.99 10*3/MM3 (ref 0.7–3.1)
LYMPHOCYTES NFR BLD AUTO: 22.8 % (ref 19.6–45.3)
MCH RBC QN AUTO: 31.6 PG (ref 26.6–33)
MCHC RBC AUTO-ENTMCNC: 34.5 G/DL (ref 31.5–35.7)
MCV RBC AUTO: 91.7 FL (ref 79–97)
MONOCYTES # BLD AUTO: 0.46 10*3/MM3 (ref 0.1–0.9)
MONOCYTES NFR BLD AUTO: 10.6 % (ref 5–12)
NEUTROPHILS # BLD AUTO: 2.69 10*3/MM3 (ref 1.7–7)
NEUTROPHILS NFR BLD AUTO: 61.8 % (ref 42.7–76)
NRBC BLD AUTO-RTO: 0.2 /100 WBC (ref 0–0.2)
PLATELET # BLD AUTO: 161 10*3/MM3 (ref 140–450)
POTASSIUM SERPL-SCNC: 4.2 MMOL/L (ref 3.5–5.2)
PROT SERPL-MCNC: 6.1 G/DL (ref 6–8.5)
PSA SERPL-MCNC: 1.49 NG/ML (ref 0–4)
RBC # BLD AUTO: 4.84 10*6/MM3 (ref 4.14–5.8)
SODIUM SERPL-SCNC: 143 MMOL/L (ref 136–145)
TRIGL SERPL-MCNC: 82 MG/DL (ref 0–150)
TSH SERPL DL<=0.005 MIU/L-ACNC: 2.78 UIU/ML (ref 0.27–4.2)
VLDLC SERPL CALC-MCNC: 15 MG/DL (ref 5–40)
WBC # BLD AUTO: 4.35 10*3/MM3 (ref 3.4–10.8)

## 2021-06-16 RX ORDER — LISINOPRIL AND HYDROCHLOROTHIAZIDE 20; 12.5 MG/1; MG/1
TABLET ORAL
Qty: 90 TABLET | Refills: 2 | Status: SHIPPED | OUTPATIENT
Start: 2021-06-16 | End: 2022-01-11 | Stop reason: SDUPTHER

## 2021-06-18 ENCOUNTER — OFFICE VISIT (OUTPATIENT)
Dept: FAMILY MEDICINE CLINIC | Facility: CLINIC | Age: 71
End: 2021-06-18

## 2021-06-18 VITALS
HEIGHT: 74 IN | SYSTOLIC BLOOD PRESSURE: 130 MMHG | TEMPERATURE: 97.5 F | OXYGEN SATURATION: 97 % | WEIGHT: 223.8 LBS | DIASTOLIC BLOOD PRESSURE: 80 MMHG | HEART RATE: 81 BPM | RESPIRATION RATE: 16 BRPM | BODY MASS INDEX: 28.72 KG/M2

## 2021-06-18 DIAGNOSIS — R73.9 HYPERGLYCEMIA: ICD-10-CM

## 2021-06-18 DIAGNOSIS — E78.00 HYPERCHOLESTEROLEMIA: Primary | ICD-10-CM

## 2021-06-18 DIAGNOSIS — G89.29 CHRONIC PAIN OF BOTH SHOULDERS: ICD-10-CM

## 2021-06-18 DIAGNOSIS — Z86.16 HISTORY OF 2019 NOVEL CORONAVIRUS DISEASE (COVID-19): ICD-10-CM

## 2021-06-18 DIAGNOSIS — M25.511 CHRONIC PAIN OF BOTH SHOULDERS: ICD-10-CM

## 2021-06-18 DIAGNOSIS — M25.512 CHRONIC PAIN OF BOTH SHOULDERS: ICD-10-CM

## 2021-06-18 DIAGNOSIS — I10 ESSENTIAL HYPERTENSION: ICD-10-CM

## 2021-06-18 PROCEDURE — 99214 OFFICE O/P EST MOD 30 MIN: CPT | Performed by: INTERNAL MEDICINE

## 2021-06-18 NOTE — PROGRESS NOTES
Jessica Kuhn is a 71 y.o. male. Patient is here today for follow-up on his hypertension, hyperlipidemia, hyperglycemia.  Patient's generally been stable and has had no chest pain, shortness of breath, edema or myalgias  Chief Complaint   Patient presents with   • Hypertension     HYPERCHOLESTEROLEMIA, HYPERGLYCEMIA- FOLLOW UP LABS          Vitals:    06/18/21 1048   BP: 130/80   Pulse: 81   Resp: 16   Temp: 97.5 °F (36.4 °C)   SpO2: 97%     Body mass index is 28.73 kg/m².  The following portions of the patient's history were reviewed and updated as appropriate: allergies, current medications, past family history, past medical history, past social history, past surgical history and problem list.    Past Medical History:   Diagnosis Date   • Hyperlipidemia    • Hypertension       No Known Allergies   Social History     Socioeconomic History   • Marital status:      Spouse name: Not on file   • Number of children: Not on file   • Years of education: Not on file   • Highest education level: Not on file   Tobacco Use   • Smoking status: Never Smoker   • Smokeless tobacco: Never Used   Substance and Sexual Activity   • Alcohol use: Yes   • Drug use: No   • Sexual activity: Defer        Current Outpatient Medications:   •  lisinopril-hydrochlorothiazide (PRINZIDE,ZESTORETIC) 20-12.5 MG per tablet, TAKE ONE TABLET BY MOUTH DAILY, Disp: 90 tablet, Rfl: 2  •  rosuvastatin (CRESTOR) 5 MG tablet, TAKE ONE TABLET BY MOUTH DAILY, Disp: 90 tablet, Rfl: 2     Objective     History of Present Illness     Review of Systems   Constitutional: Negative.    HENT: Negative.    Respiratory: Negative.    Cardiovascular: Negative.    Gastrointestinal: Negative.    Genitourinary: Negative.    Musculoskeletal: Negative.    Skin: Negative.    Neurological: Negative.    Hematological: Negative.    Psychiatric/Behavioral: Negative.        Physical Exam  Vitals (120/60) and nursing note reviewed.   Constitutional:        General: He is not in acute distress.     Appearance: Normal appearance. He is not ill-appearing.   HENT:      Head: Normocephalic and atraumatic.   Eyes:      General: No scleral icterus.     Conjunctiva/sclera: Conjunctivae normal.   Cardiovascular:      Rate and Rhythm: Normal rate and regular rhythm.      Heart sounds: Normal heart sounds.   Pulmonary:      Effort: Pulmonary effort is normal. No respiratory distress.      Breath sounds: Normal breath sounds. No wheezing or rales.   Musculoskeletal:         General: Normal range of motion.      Cervical back: Normal range of motion and neck supple.   Skin:     General: Skin is warm and dry.   Neurological:      General: No focal deficit present.      Mental Status: He is alert and oriented to person, place, and time.   Psychiatric:         Mood and Affect: Mood normal.         Behavior: Behavior normal.         ASSESSMENT CBC is completely normal.  CMP is also completely normal.  Lipid panel is stable with total cholesterol 159, HDL 72, LDL 72.  Hemoglobin A1c is normal at 5.6.  TSH is normal and PSA is normal and stable  #1-hypertension, controlled on medication  #2-hyperlipidemia, well controlled on medication  #3-history of hyperglycemia with normal sugar and hemoglobin A1c today  #4-bilateral shoulder pain, chronic and relatively mild.  Patient would like to see orthopedics     Problems Addressed this Visit        Cardiac and Vasculature    Hypertension    Hypercholesterolemia - Primary       Endocrine and Metabolic    Hyperglycemia      Diagnoses       Codes Comments    Hypercholesterolemia    -  Primary ICD-10-CM: E78.00  ICD-9-CM: 272.0     Essential hypertension     ICD-10-CM: I10  ICD-9-CM: 401.9     Hyperglycemia     ICD-10-CM: R73.9  ICD-9-CM: 790.29           PLAN patient has seen Dr. Ta in the past for joint problems and I am going to refer him to Dr. Hinds's group for his shoulder pain.  He will continue current medicines as now and I plan  on rechecking him in 6 months with a CMP, lipid panel and a wellness visit    There are no Patient Instructions on file for this visit.  No follow-ups on file.

## 2021-09-20 RX ORDER — ROSUVASTATIN CALCIUM 5 MG/1
TABLET, COATED ORAL
Qty: 90 TABLET | Refills: 2 | Status: SHIPPED | OUTPATIENT
Start: 2021-09-20 | End: 2022-08-12

## 2021-12-14 ENCOUNTER — OFFICE VISIT (OUTPATIENT)
Dept: FAMILY MEDICINE CLINIC | Facility: CLINIC | Age: 71
End: 2021-12-14

## 2021-12-14 VITALS
HEIGHT: 74 IN | DIASTOLIC BLOOD PRESSURE: 82 MMHG | HEART RATE: 89 BPM | TEMPERATURE: 97.5 F | BODY MASS INDEX: 28.23 KG/M2 | RESPIRATION RATE: 18 BRPM | WEIGHT: 220 LBS | OXYGEN SATURATION: 96 % | SYSTOLIC BLOOD PRESSURE: 166 MMHG

## 2021-12-14 DIAGNOSIS — M79.89 SOFT TISSUE MASS: Primary | ICD-10-CM

## 2021-12-14 DIAGNOSIS — S46.812A STRAIN OF LEFT TRAPEZIUS MUSCLE, INITIAL ENCOUNTER: ICD-10-CM

## 2021-12-14 PROCEDURE — 99213 OFFICE O/P EST LOW 20 MIN: CPT | Performed by: NURSE PRACTITIONER

## 2021-12-14 NOTE — PROGRESS NOTES
"Subjective     Roney Kuhn is a 71 y.o.. male.     Cyst  This is a new problem. Episode onset: 3 days. The problem has been unchanged. Associated symptoms include nausea and neck pain. Pertinent negatives include no abdominal pain, chest pain, coughing, fever or vomiting. Associated symptoms comments: Mild ache to area. Nothing aggravates the symptoms.   Neck Pain   This is a recurrent problem. Episode onset: several years. The problem has been waxing and waning. The pain is present in the left side. The symptoms are aggravated by twisting. Pertinent negatives include no chest pain or fever. Treatments tried: chiro several yrs ago.       The following portions of the patient's history were reviewed and updated as appropriate: allergies, current medications, past family history, past medical history, past social history, past surgical history and problem list.    Past Medical History:   Diagnosis Date   • Hyperlipidemia    • Hypertension        Past Surgical History:   Procedure Laterality Date   • APPENDECTOMY     • BASAL CELL CARCINOMA EXCISION     • HERNIA REPAIR     • KNEE SURGERY Right    • SHOULDER SURGERY Right        Review of Systems   Constitutional: Negative for fever.   Respiratory: Negative for cough and shortness of breath.    Cardiovascular: Negative for chest pain.   Gastrointestinal: Positive for nausea. Negative for abdominal distention, abdominal pain, diarrhea and vomiting.        Left side mass   Genitourinary: Negative.    Musculoskeletal: Positive for neck pain.       No Known Allergies    Objective     Vitals:    12/14/21 0909   BP: 166/82   BP Location: Right arm   Patient Position: Sitting   Pulse: 89   Resp: 18   Temp: 97.5 °F (36.4 °C)   TempSrc: Oral   SpO2: 96%   Weight: 99.8 kg (220 lb)   Height: 188 cm (74.02\")     Body mass index is 28.23 kg/m².    Physical Exam  Vitals reviewed.   HENT:      Head: Normocephalic.   Eyes:      Pupils: Pupils are equal, round, and reactive to " light.   Neck:      Comments: Left side of neck: trapezius tight/tense  Cardiovascular:      Rate and Rhythm: Normal rate and regular rhythm.   Pulmonary:      Effort: Pulmonary effort is normal.      Breath sounds: Normal breath sounds.   Abdominal:          Comments: Left side of abd: non-tender, mobile medium sized soft tissue mass noted, no redness noted, no swelling noted   Musculoskeletal:         General: Normal range of motion.      Cervical back: Normal range of motion and neck supple. No edema, rigidity or crepitus. No pain with movement or spinous process tenderness. Normal range of motion.   Lymphadenopathy:      Head:      Left side of head: No occipital adenopathy.      Cervical: No cervical adenopathy.      Left cervical: No superficial, deep or posterior cervical adenopathy.   Neurological:      Mental Status: He is alert and oriented to person, place, and time.   Psychiatric:         Behavior: Behavior normal.           Current Outpatient Medications:   •  lisinopril-hydrochlorothiazide (PRINZIDE,ZESTORETIC) 20-12.5 MG per tablet, TAKE ONE TABLET BY MOUTH DAILY, Disp: 90 tablet, Rfl: 2  •  rosuvastatin (CRESTOR) 5 MG tablet, TAKE ONE TABLET BY MOUTH DAILY, Disp: 90 tablet, Rfl: 2        Assessment/Plan   Diagnoses and all orders for this visit:    1. Soft tissue mass (Primary)  Comments:  left side abd. with non-tender, mobile medium sized mass  Orders:  -     US Head Neck Soft Tissue; Future    2. Strain of left trapezius muscle, initial encounter        Patient Instructions   Left side soft tissue mass: will do u/s for further evaluation    Left trapezius muscle strain: May use cold compress/ice pack 10-15 minutes at a time several times a day; May use warm compress/heating pad 10-15 minutes at at time several times a day; May use over the counter biofreeze as needed (wash off from skin before using heating pad; pt declined physical therapy at this time, discussed at home ROM exercises/stretches  to do.      Return for follow up after completion of u/s.

## 2021-12-14 NOTE — PATIENT INSTRUCTIONS
Left side soft tissue mass: will do u/s for further evaluation    Left trapezius muscle strain: May use cold compress/ice pack 10-15 minutes at a time several times a day; May use warm compress/heating pad 10-15 minutes at at time several times a day; May use over the counter biofreeze as needed (wash off from skin before using heating pad; pt declined physical therapy at this time, discussed at home ROM exercises/stretches to do.

## 2021-12-28 DIAGNOSIS — E78.00 HYPERCHOLESTEROLEMIA: Primary | ICD-10-CM

## 2022-01-06 ENCOUNTER — HOSPITAL ENCOUNTER (OUTPATIENT)
Dept: ULTRASOUND IMAGING | Facility: HOSPITAL | Age: 72
Discharge: HOME | End: 2022-01-06
Admitting: NURSE PRACTITIONER

## 2022-01-06 DIAGNOSIS — M79.89 SOFT TISSUE MASS: ICD-10-CM

## 2022-01-06 PROCEDURE — 76705 ECHO EXAM OF ABDOMEN: CPT

## 2022-01-11 ENCOUNTER — OFFICE VISIT (OUTPATIENT)
Dept: FAMILY MEDICINE CLINIC | Facility: CLINIC | Age: 72
End: 2022-01-11

## 2022-01-11 VITALS
HEIGHT: 74 IN | WEIGHT: 215 LBS | HEART RATE: 80 BPM | RESPIRATION RATE: 18 BRPM | TEMPERATURE: 97.8 F | DIASTOLIC BLOOD PRESSURE: 78 MMHG | OXYGEN SATURATION: 98 % | BODY MASS INDEX: 27.59 KG/M2 | SYSTOLIC BLOOD PRESSURE: 140 MMHG

## 2022-01-11 DIAGNOSIS — I10 PRIMARY HYPERTENSION: ICD-10-CM

## 2022-01-11 DIAGNOSIS — E78.00 HYPERCHOLESTEROLEMIA: Primary | ICD-10-CM

## 2022-01-11 DIAGNOSIS — R73.9 HYPERGLYCEMIA: ICD-10-CM

## 2022-01-11 DIAGNOSIS — R22.9 LOCALIZED SKIN MASS, LUMP, OR SWELLING: ICD-10-CM

## 2022-01-11 DIAGNOSIS — R19.02 LEFT UPPER QUADRANT ABDOMINAL MASS: ICD-10-CM

## 2022-01-11 PROCEDURE — 99214 OFFICE O/P EST MOD 30 MIN: CPT | Performed by: INTERNAL MEDICINE

## 2022-01-11 RX ORDER — LISINOPRIL AND HYDROCHLOROTHIAZIDE 20; 12.5 MG/1; MG/1
2 TABLET ORAL DAILY
Qty: 180 TABLET | Refills: 3 | Status: SHIPPED | OUTPATIENT
Start: 2022-01-11 | End: 2023-02-08 | Stop reason: SDUPTHER

## 2022-01-11 NOTE — PROGRESS NOTES
Jessica Kuhn is a 71 y.o. male. Patient is here today for follow-up on his hypertension, hyperlipidemia, hyperglycemia.  He also was seen recently for left flank mass and had an ultrasound and is here to review the results.  Additionally he tells me about a mass in the back of his head on the left.  Neither are painful or causing any symptoms have been present for a year or so.  Chief Complaint   Patient presents with   • Hypertension     HYPERCHOLESTEROLEMIA- F/U LABS AND GO OVER RECENT ULTRASOUND RESULTS           Vitals:    01/11/22 0853   BP: 140/78   Pulse: 80   Resp: 18   Temp: 97.8 °F (36.6 °C)   SpO2: 98%     Body mass index is 27.59 kg/m².  The following portions of the patient's history were reviewed and updated as appropriate: allergies, current medications, past family history, past medical history, past social history, past surgical history and problem list.    Past Medical History:   Diagnosis Date   • Hyperlipidemia    • Hypertension       No Known Allergies   Social History     Socioeconomic History   • Marital status:    Tobacco Use   • Smoking status: Never Smoker   • Smokeless tobacco: Never Used   Substance and Sexual Activity   • Alcohol use: Yes   • Drug use: No   • Sexual activity: Defer        Current Outpatient Medications:   •  lisinopril-hydrochlorothiazide (PRINZIDE,ZESTORETIC) 20-12.5 MG per tablet, TAKE ONE TABLET BY MOUTH DAILY, Disp: 90 tablet, Rfl: 2  •  rosuvastatin (CRESTOR) 5 MG tablet, TAKE ONE TABLET BY MOUTH DAILY, Disp: 90 tablet, Rfl: 2     Objective     History of Present Illness     Review of Systems   Constitutional: Negative.    Respiratory: Negative.    Cardiovascular: Negative.    Gastrointestinal: Negative.    Genitourinary: Negative.    Neurological: Negative.        Physical Exam  Vitals (140/75) and nursing note reviewed.   Constitutional:       General: He is not in acute distress.     Appearance: Normal appearance. He is not  ill-appearing.   HENT:      Head: Normocephalic and atraumatic.   Neck:      Comments: There is a rounded 3 to 4 cm mass in the area of the sternocleidomastoid insertion in the left occiput  Cardiovascular:      Rate and Rhythm: Normal rate and regular rhythm.      Heart sounds: Normal heart sounds.   Pulmonary:      Effort: Pulmonary effort is normal. No respiratory distress.      Breath sounds: Normal breath sounds. No wheezing or rales.   Abdominal:      Comments: There is a soft tissue movable left flank mass just below the ribs that is nontender   Musculoskeletal:         General: Normal range of motion.   Skin:     General: Skin is warm and dry.   Neurological:      General: No focal deficit present.      Mental Status: He is alert and oriented to person, place, and time.   Psychiatric:         Mood and Affect: Mood normal.         Behavior: Behavior normal.         ASSESSMENT CMP was normal.  Lipid panel has total cholesterol 190, HDL 88 and LDL 89.  The ultrasound showed a noncystic lesion at the left abdominal wall that could represent a lipoma or possibly tissue through hernia.  Radiology recommended a CT scan.  #1-hypertension, not optimally controlled  #2-hyperlipidemia controlled on medication  #3-history of hyperglycemia with normal sugar today  #4-indeterminant left flank mass  #5-left mass in the area of the sternocleidomastoid insertion on the left occiput     Problems Addressed this Visit        Cardiac and Vasculature    Hypertension    Hypercholesterolemia - Primary       Endocrine and Metabolic    Hyperglycemia      Diagnoses       Codes Comments    Hypercholesterolemia    -  Primary ICD-10-CM: E78.00  ICD-9-CM: 272.0     Primary hypertension     ICD-10-CM: I10  ICD-9-CM: 401.9     Hyperglycemia     ICD-10-CM: R73.9  ICD-9-CM: 790.29           PLAN I am going to have the patient take lisinopril HCT 20-12.5, 2 tablets each morning and continue his rosuvastatin.  I have ordered a CT scan of the  abdomen for the left flank mass.  I am referring the patient to general surgery for their opinion on his left flank mass and they can also check the left occipital area mass as well.  I would like to recheck the patient in about 2 weeks to follow-up on the CT scan and also see how his blood pressure is doing    There are no Patient Instructions on file for this visit.  No follow-ups on file.

## 2022-01-14 ENCOUNTER — IMMUNIZATION (OUTPATIENT)
Dept: VACCINE CLINIC | Facility: HOSPITAL | Age: 72
End: 2022-01-14

## 2022-01-14 PROCEDURE — 91300 HC SARSCOV02 VAC 30MCG/0.3ML IM: CPT | Performed by: INTERNAL MEDICINE

## 2022-01-14 PROCEDURE — 0004A HC ADM SARSCOV2 30MCG/0.3ML BOOSTER: CPT | Performed by: INTERNAL MEDICINE

## 2022-01-20 ENCOUNTER — HOSPITAL ENCOUNTER (OUTPATIENT)
Dept: CT IMAGING | Facility: HOSPITAL | Age: 72
Discharge: HOME | End: 2022-01-20
Admitting: INTERNAL MEDICINE

## 2022-01-20 DIAGNOSIS — R19.02 LEFT UPPER QUADRANT ABDOMINAL MASS: ICD-10-CM

## 2022-01-20 PROCEDURE — 82565 ASSAY OF CREATININE: CPT

## 2022-01-20 PROCEDURE — 0 DIATRIZOATE MEGLUMINE & SODIUM PER 1 ML: Performed by: INTERNAL MEDICINE

## 2022-01-20 PROCEDURE — 74160 CT ABDOMEN W/CONTRAST: CPT

## 2022-01-20 PROCEDURE — 25010000002 IOPAMIDOL 61 % SOLUTION: Performed by: INTERNAL MEDICINE

## 2022-01-20 RX ADMIN — IOPAMIDOL 85 ML: 612 INJECTION, SOLUTION INTRAVENOUS at 09:50

## 2022-01-20 RX ADMIN — DIATRIZOATE MEGLUMINE AND DIATRIZOATE SODIUM 30 ML: 660; 100 LIQUID ORAL; RECTAL at 09:00

## 2022-01-21 LAB — CREAT BLDA-MCNC: 0.9 MG/DL (ref 0.6–1.3)

## 2022-01-27 ENCOUNTER — OFFICE VISIT (OUTPATIENT)
Dept: SURGERY | Facility: CLINIC | Age: 72
End: 2022-01-27

## 2022-01-27 VITALS — WEIGHT: 216.2 LBS | BODY MASS INDEX: 27.75 KG/M2 | HEIGHT: 74 IN

## 2022-01-27 DIAGNOSIS — D49.2 SOFT TISSUE NEOPLASM: Primary | ICD-10-CM

## 2022-01-27 PROCEDURE — 99202 OFFICE O/P NEW SF 15 MIN: CPT | Performed by: SURGERY

## 2022-08-12 RX ORDER — ROSUVASTATIN CALCIUM 5 MG/1
TABLET, COATED ORAL
Qty: 90 TABLET | Refills: 2 | Status: SHIPPED | OUTPATIENT
Start: 2022-08-12 | End: 2023-02-08 | Stop reason: SDUPTHER

## 2022-12-07 ENCOUNTER — TELEPHONE (OUTPATIENT)
Dept: FAMILY MEDICINE CLINIC | Facility: CLINIC | Age: 72
End: 2022-12-07

## 2022-12-07 NOTE — TELEPHONE ENCOUNTER
Caller: Roney Kuhn    Relationship: Self    Best call back number: [phone]     What medication are you requesting: WHATEVER THE PATIENT'S PCP RECOMMENDS    What are your current symptoms: CHEST CONGESTION, STUFFY NOSE, COUGH,WATERY EYES, BODY ACHES, SORE THROAT    How long have you been experiencing symptoms: 3 DAYS     Have you had these symptoms before:    [] Yes  [x] No    Have you been treated for these symptoms before:   [] Yes  [x] No    If a prescription is needed, what is your preferred pharmacy and phone number: [facility] [id number] - [place], [place] - [address] [facility] [address] - [phone]  - [phone] FX

## 2022-12-08 ENCOUNTER — OFFICE VISIT (OUTPATIENT)
Dept: FAMILY MEDICINE CLINIC | Facility: CLINIC | Age: 72
End: 2022-12-08

## 2022-12-08 VITALS
TEMPERATURE: 98.2 F | SYSTOLIC BLOOD PRESSURE: 162 MMHG | BODY MASS INDEX: 27.75 KG/M2 | DIASTOLIC BLOOD PRESSURE: 70 MMHG | HEIGHT: 74 IN | OXYGEN SATURATION: 96 % | RESPIRATION RATE: 18 BRPM | HEART RATE: 88 BPM

## 2022-12-08 DIAGNOSIS — J02.9 ACUTE PHARYNGITIS, UNSPECIFIED ETIOLOGY: ICD-10-CM

## 2022-12-08 DIAGNOSIS — R05.9 COUGH, UNSPECIFIED TYPE: ICD-10-CM

## 2022-12-08 DIAGNOSIS — R52 BODY ACHES: ICD-10-CM

## 2022-12-08 DIAGNOSIS — J06.9 ACUTE URI: Primary | ICD-10-CM

## 2022-12-08 LAB
EXPIRATION DATE: NORMAL
EXPIRATION DATE: NORMAL
FLUAV AG UPPER RESP QL IA.RAPID: NOT DETECTED
FLUBV AG UPPER RESP QL IA.RAPID: NOT DETECTED
INTERNAL CONTROL: NORMAL
INTERNAL CONTROL: NORMAL
Lab: NORMAL
Lab: NORMAL
S PYO AG THROAT QL: NEGATIVE
SARS-COV-2 RNA RESP QL NAA+PROBE: NOT DETECTED

## 2022-12-08 PROCEDURE — 87428 SARSCOV & INF VIR A&B AG IA: CPT | Performed by: NURSE PRACTITIONER

## 2022-12-08 PROCEDURE — 87880 STREP A ASSAY W/OPTIC: CPT | Performed by: NURSE PRACTITIONER

## 2022-12-08 PROCEDURE — 99213 OFFICE O/P EST LOW 20 MIN: CPT | Performed by: NURSE PRACTITIONER

## 2022-12-08 RX ORDER — AZELASTINE 1 MG/ML
1 SPRAY, METERED NASAL 2 TIMES DAILY
Qty: 1 EACH | Refills: 0 | Status: SHIPPED | OUTPATIENT
Start: 2022-12-08 | End: 2022-12-22

## 2022-12-08 RX ORDER — PREDNISONE 20 MG/1
20 TABLET ORAL 2 TIMES DAILY
Qty: 6 TABLET | Refills: 0 | Status: SHIPPED | OUTPATIENT
Start: 2022-12-08 | End: 2022-12-11

## 2022-12-08 RX ORDER — BENZONATATE 100 MG/1
100 CAPSULE ORAL 3 TIMES DAILY PRN
Qty: 30 CAPSULE | Refills: 0 | Status: SHIPPED | OUTPATIENT
Start: 2022-12-08 | End: 2023-03-08

## 2022-12-08 NOTE — PROGRESS NOTES
"Subjective  Answers for HPI/ROS submitted by the patient on 12/7/2022  What is the primary reason for your visit?: Cough    Roney Kuhn is a 72 y.o.. male.     2-3 days ago started getting worse    Cough  This is a new problem. The current episode started 1 to 4 weeks ago. The problem has been rapidly worsening. The problem occurs every few hours. The cough is productive of sputum. Associated symptoms include headaches, nasal congestion, postnasal drip, rhinorrhea, a sore throat and shortness of breath (with coughing). Pertinent negatives include no ear pain or fever. The symptoms are aggravated by lying down. Risk factors for lung disease include animal exposure.       The following portions of the patient's history were reviewed and updated as appropriate: allergies, current medications, past family history, past medical history, past social history, past surgical history and problem list.    Past Medical History:   Diagnosis Date   • Back problem    • Basal cell carcinoma    • Claustrophobia    • Hyperlipidemia    • Hypertension    • Kidney stones        Past Surgical History:   Procedure Laterality Date   • APPENDECTOMY     • BASAL CELL CARCINOMA EXCISION     • EYE SURGERY     • HERNIA REPAIR     • KIDNEY STONE SURGERY     • KNEE SURGERY Right    • SHOULDER SURGERY Right    • WISDOM TOOTH EXTRACTION         Review of Systems   Constitutional: Negative for fever.   HENT: Positive for congestion, postnasal drip, rhinorrhea and sore throat. Negative for ear pain.    Respiratory: Positive for cough (prod) and shortness of breath (with coughing).    Gastrointestinal: Negative for diarrhea, nausea and vomiting.   Musculoskeletal: Positive for arthralgias.   Neurological: Positive for headaches.       No Known Allergies    Objective     Vitals:    12/08/22 0935   BP: 162/70   Pulse: 88   Resp: 18   Temp: 98.2 °F (36.8 °C)   TempSrc: Oral   SpO2: 96%   Height: 188 cm (74.02\")     Body mass index is 27.75 " kg/m².    Physical Exam  Vitals reviewed.   Constitutional:       Appearance: He is well-developed.   HENT:      Head: Normocephalic and atraumatic.      Right Ear: Tympanic membrane normal. No middle ear effusion. Tympanic membrane is not erythematous.      Left Ear: Tympanic membrane normal.  No middle ear effusion. Tympanic membrane is not erythematous.      Nose: Congestion present.      Mouth/Throat:      Mouth: Mucous membranes are moist.      Pharynx: Oropharyngeal exudate (thick pnd) and posterior oropharyngeal erythema (slight) present. No pharyngeal swelling.   Eyes:      Conjunctiva/sclera: Conjunctivae normal.      Pupils: Pupils are equal, round, and reactive to light.   Cardiovascular:      Rate and Rhythm: Normal rate and regular rhythm.      Heart sounds: No murmur heard.  Pulmonary:      Effort: Pulmonary effort is normal. No accessory muscle usage or respiratory distress.      Breath sounds: Normal breath sounds. No stridor. No wheezing, rhonchi or rales.   Musculoskeletal:         General: Normal range of motion.   Lymphadenopathy:      Cervical: Cervical adenopathy (shotty) present.   Skin:     General: Skin is warm and dry.   Neurological:      Mental Status: He is alert and oriented to person, place, and time.           Current Outpatient Medications:   •  lisinopril-hydrochlorothiazide (PRINZIDE,ZESTORETIC) 20-12.5 MG per tablet, Take 2 tablets by mouth Daily., Disp: 180 tablet, Rfl: 3  •  rosuvastatin (CRESTOR) 5 MG tablet, TAKE ONE TABLET BY MOUTH DAILY, Disp: 90 tablet, Rfl: 2  •  azelastine (ASTELIN) 0.1 % nasal spray, 1 spray into the nostril(s) as directed by provider 2 (Two) Times a Day for 14 days. Use in each nostril as directed, Disp: 1 each, Rfl: 0  •  benzonatate (Tessalon Perles) 100 MG capsule, Take 1 capsule by mouth 3 (Three) Times a Day As Needed for Cough., Disp: 30 capsule, Rfl: 0  •  predniSONE (DELTASONE) 20 MG tablet, Take 1 tablet by mouth 2 (Two) Times a Day for 3  days., Disp: 6 tablet, Rfl: 0    Recent Results (from the past 2016 hour(s))   POCT rapid strep A    Collection Time: 12/08/22  9:51 AM    Specimen: Swab   Result Value Ref Range    Rapid Strep A Screen Negative Negative, VALID, INVALID, Not Performed    Internal Control Passed Passed    Lot Number [id number]     Expiration Date 12/31/2023    Covid-19 + Flu A&B AG, Veritor    Collection Time: 12/08/22  9:55 AM    Specimen: Swab   Result Value Ref Range    COVID19 Not Detected Not Detected - Ref. Range    Influenza A Antigen DANIELLE Not Detected Not Detected    Influenza B Antigen DANIELLE Not Detected Not Detected    Internal Control Passed Passed    Lot Number [id number]     Expiration Date 11/12/2023            Diagnoses and all orders for this visit:    1. Acute URI (Primary)  -     azelastine (ASTELIN) 0.1 % nasal spray; 1 spray into the nostril(s) as directed by provider 2 (Two) Times a Day for 14 days. Use in each nostril as directed  Dispense: 1 each; Refill: 0    2. Acute pharyngitis, unspecified etiology  -     POCT rapid strep A    3. Cough, unspecified type  -     Covid-19 + Flu A&B AG, Veritor  -     predniSONE (DELTASONE) 20 MG tablet; Take 1 tablet by mouth 2 (Two) Times a Day for 3 days.  Dispense: 6 tablet; Refill: 0  -     benzonatate (Tessalon Perles) 100 MG capsule; Take 1 capsule by mouth 3 (Three) Times a Day As Needed for Cough.  Dispense: 30 capsule; Refill: 0    4. Body aches  -     Covid-19 + Flu A&B AG, Veritor        Patient Instructions   Drink plenty of fluids-water preferably, eat a heart healthy diet, get plenty of sleep and do warm salt water gargles twice a day until feeling better. Pt verb. Understanding.       Return if symptoms worsen or fail to improve, for Dr. Wellington as needed/as recommended.

## 2023-01-24 ENCOUNTER — TELEPHONE (OUTPATIENT)
Dept: FAMILY MEDICINE CLINIC | Facility: CLINIC | Age: 73
End: 2023-01-24

## 2023-01-24 NOTE — TELEPHONE ENCOUNTER
Caller: Roney Kuhn    Relationship: Self    Best call back number: [phone]    What orders are you requesting (i.e. lab or imaging): LABS FOR FOLLOW UP VISIT     In what timeframe would the patient need to come in: ASAP    Where will you receive your lab/imaging services: IN OFFICE     Additional notes: PLEASE CALL TO LET PATIENT KNOW ONCE ORDERS HAVE BEEN PLACED.

## 2023-01-26 DIAGNOSIS — R73.9 HYPERGLYCEMIA: ICD-10-CM

## 2023-01-26 DIAGNOSIS — E78.00 HYPERCHOLESTEROLEMIA: Primary | ICD-10-CM

## 2023-01-26 DIAGNOSIS — Z12.5 SPECIAL SCREENING FOR MALIGNANT NEOPLASM OF PROSTATE: ICD-10-CM

## 2023-02-08 ENCOUNTER — OFFICE VISIT (OUTPATIENT)
Dept: FAMILY MEDICINE CLINIC | Facility: CLINIC | Age: 73
End: 2023-02-08
Payer: MEDICARE

## 2023-02-08 VITALS
WEIGHT: 193 LBS | TEMPERATURE: 97.3 F | DIASTOLIC BLOOD PRESSURE: 78 MMHG | BODY MASS INDEX: 24.77 KG/M2 | OXYGEN SATURATION: 98 % | HEART RATE: 70 BPM | SYSTOLIC BLOOD PRESSURE: 148 MMHG | HEIGHT: 74 IN | RESPIRATION RATE: 16 BRPM

## 2023-02-08 DIAGNOSIS — I10 PRIMARY HYPERTENSION: ICD-10-CM

## 2023-02-08 DIAGNOSIS — G89.29 CHRONIC PAIN OF BOTH SHOULDERS: ICD-10-CM

## 2023-02-08 DIAGNOSIS — F51.01 PRIMARY INSOMNIA: Primary | ICD-10-CM

## 2023-02-08 DIAGNOSIS — Z87.442 HISTORY OF NEPHROLITHIASIS: ICD-10-CM

## 2023-02-08 DIAGNOSIS — R73.9 HYPERGLYCEMIA: ICD-10-CM

## 2023-02-08 DIAGNOSIS — E78.00 HYPERCHOLESTEROLEMIA: ICD-10-CM

## 2023-02-08 DIAGNOSIS — M25.511 CHRONIC PAIN OF BOTH SHOULDERS: ICD-10-CM

## 2023-02-08 DIAGNOSIS — M25.512 CHRONIC PAIN OF BOTH SHOULDERS: ICD-10-CM

## 2023-02-08 PROCEDURE — 96160 PT-FOCUSED HLTH RISK ASSMT: CPT | Performed by: INTERNAL MEDICINE

## 2023-02-08 PROCEDURE — 1170F FXNL STATUS ASSESSED: CPT | Performed by: INTERNAL MEDICINE

## 2023-02-08 PROCEDURE — G0439 PPPS, SUBSEQ VISIT: HCPCS | Performed by: INTERNAL MEDICINE

## 2023-02-08 PROCEDURE — 1159F MED LIST DOCD IN RCRD: CPT | Performed by: INTERNAL MEDICINE

## 2023-02-08 PROCEDURE — 99213 OFFICE O/P EST LOW 20 MIN: CPT | Performed by: INTERNAL MEDICINE

## 2023-02-08 RX ORDER — TRAZODONE HYDROCHLORIDE 50 MG/1
50 TABLET ORAL NIGHTLY
Qty: 90 TABLET | Refills: 1 | Status: SHIPPED | OUTPATIENT
Start: 2023-02-08

## 2023-02-08 RX ORDER — LISINOPRIL AND HYDROCHLOROTHIAZIDE 20; 12.5 MG/1; MG/1
2 TABLET ORAL DAILY
Qty: 180 TABLET | Refills: 3 | Status: SHIPPED | OUTPATIENT
Start: 2023-02-08

## 2023-02-08 RX ORDER — ROSUVASTATIN CALCIUM 5 MG/1
5 TABLET, COATED ORAL DAILY
Qty: 90 TABLET | Refills: 3 | Status: SHIPPED | OUTPATIENT
Start: 2023-02-08

## 2023-02-08 NOTE — PROGRESS NOTES
The ABCs of the Annual Wellness Visit  Subsequent Medicare Wellness Visit    Subjective      Roney Kuhn is a 73 y.o. male who presents for a Subsequent Medicare Wellness Visit.  He is also here for follow-up on his hypertension, hyperlipidemia, shoulder pain and insomnia.  Overall he is generally been stable.  He would like to see orthopedics because of pain in his right shoulder.    The following portions of the patient's history were reviewed and   updated as appropriate: allergies, current medications, past family history, past medical history, past social history, past surgical history and problem list.    Compared to one year ago, the patient feels his physical   health is better.    Compared to one year ago, the patient feels his mental   health is worse.    Recent Hospitalizations:  He was not admitted to the hospital during the last year.       Current Medical Providers:  Patient Care Team:  Mikhail Wellington MD as PCP - General (Internal Medicine)    Outpatient Medications Prior to Visit   Medication Sig Dispense Refill   • lisinopril-hydrochlorothiazide (PRINZIDE,ZESTORETIC) 20-12.5 MG per tablet Take 2 tablets by mouth Daily. 180 tablet 3   • rosuvastatin (CRESTOR) 5 MG tablet TAKE ONE TABLET BY MOUTH DAILY 90 tablet 2   • benzonatate (Tessalon Perles) 100 MG capsule Take 1 capsule by mouth 3 (Three) Times a Day As Needed for Cough. 30 capsule 0     No facility-administered medications prior to visit.       No opioid medication identified on active medication list. I have reviewed chart for other potential  high risk medication/s and harmful drug interactions in the elderly.          Aspirin is not on active medication list.  Aspirin use is not indicated based on review of current medical condition/s. Risk of harm outweighs potential benefits.  .    Patient Active Problem List   Diagnosis   • Fatigue   • Hypertension   • Hypercholesterolemia   • Dyssomnia   • Overactive bladder   • Chronic  "nasal congestion   • Hyperglycemia   • History of nephrolithiasis   • History of 2019 novel coronavirus disease (COVID-19)   • Chronic pain of both shoulders   • Left upper quadrant abdominal mass   • Localized skin mass, lump, or swelling     Advance Care Planning  Advance Directive is not on file.  ACP discussion was held with the patient during this visit. Patient has an advance directive (not in EMR), copy requested.     Objective    Vitals:    02/08/23 1006   BP: 148/78   BP Location: Left arm   Patient Position: Sitting   Cuff Size: Adult   Pulse: 70   Resp: 16   Temp: 97.3 °F (36.3 °C)   TempSrc: Temporal   SpO2: 98%   Weight: 87.5 kg (193 lb)   Height: 188 cm (74.02\")     Estimated body mass index is 24.77 kg/m² as calculated from the following:    Height as of this encounter: 188 cm (74.02\").    Weight as of this encounter: 87.5 kg (193 lb).    BMI is within normal parameters. No other follow-up for BMI required.      Does the patient have evidence of cognitive impairment?   No    Lab Results   Component Value Date    CHLPL 187 01/31/2023    TRIG 48 01/31/2023     (H) 01/31/2023    LDL 75 01/31/2023    VLDL 10 01/31/2023    HGBA1C 5.20 01/31/2023          HEALTH RISK ASSESSMENT    Smoking Status:  Social History     Tobacco Use   Smoking Status Never   Smokeless Tobacco Never     Alcohol Consumption:  Social History     Substance and Sexual Activity   Alcohol Use Yes     Fall Risk Screen:    STEADI Fall Risk Assessment was completed, and patient is at LOW risk for falls.Assessment completed on:2/8/2023    Depression Screening:  No flowsheet data found.    Health Habits and Functional and Cognitive Screening:  Functional & Cognitive Status 2/8/2023   Do you have difficulty preparing food and eating? No   Do you have difficulty bathing yourself, getting dressed or grooming yourself? No   Do you have difficulty using the toilet? No   Do you have difficulty moving around from place to place? No   Do you " have trouble with steps or getting out of a bed or a chair? No   Current Diet Well Balanced Diet   Dental Exam Up to date   Eye Exam Up to date   Exercise (times per week) 2 times per week   Current Exercises Include Walking   Current Exercise Activities Include -   Do you need help using the phone?  No   Are you deaf or do you have serious difficulty hearing?  No   Do you need help with transportation? No   Do you need help shopping? No   Do you need help preparing meals?  No   Do you need help with housework?  No   Do you need help with laundry? No   Do you need help taking your medications? No   Do you need help managing money? No   Do you ever drive or ride in a car without wearing a seat belt? No   Have you felt unusual stress, anger or loneliness in the last month? Yes   Who do you live with? Spouse   If you need help, do you have trouble finding someone available to you? No   Have you been bothered in the last four weeks by sexual problems? -   Do you have difficulty concentrating, remembering or making decisions? Yes       Age-appropriate Screening Schedule:  Refer to the list below for future screening recommendations based on patient's age, sex and/or medical conditions. Orders for these recommended tests are listed in the plan section. The patient has been provided with a written plan.    Health Maintenance   Topic Date Due   • INFLUENZA VACCINE  08/01/2022   • LIPID PANEL  01/31/2024   • ZOSTER VACCINE  Completed   • TDAP/TD VACCINES  Discontinued                CMS Preventative Services Quick Reference  Risk Factors Identified During Encounter:  CBC was completely normal.  CMP had a sugar of 120 and was otherwise essentially normal and hemoglobin A1c continues quite normal at 5.2.  PSA is normal and stable and TSH was normal.  Lipid panel has total cholesterol 187,  and LDL 75.  #1-hypertension controlled on medication  #2-hyperlipidemia, controlled on medication  #3-hyperglycemia with continued  normal hemoglobin A1c, diet controlled  #4-history of renal stones, asymptomatic and follow-up with urology  #5-bilateral shoulder pain, will refer to orthopedics  #6-insomnia    Immunizations Discussed/Encouraged: COVID19    The above risks/problems have been discussed with the patient.  Pertinent information has been shared with the patient in the After Visit Summary.    There are no diagnoses linked to this encounter.    Follow Up: I recommended a COVID-19 booster.  I am prescribing trazodone 50 mg daily for the patient's insomnia and am referring him to orthopedic surgery for his shoulder pain.  He will continue current medicines as now and I will plan on rechecking him in 6 months with a CMP, lipid panel, hemoglobin A1c    Next Medicare Wellness visit to be scheduled in 1 year.      An After Visit Summary and PPPS were made available to the patient.

## 2023-02-17 ENCOUNTER — TELEPHONE (OUTPATIENT)
Dept: FAMILY MEDICINE CLINIC | Facility: CLINIC | Age: 73
End: 2023-02-17
Payer: MEDICARE

## 2023-02-17 DIAGNOSIS — R76.8 HEPATITIS B CORE ANTIBODY POSITIVE: Primary | ICD-10-CM

## 2023-02-17 DIAGNOSIS — Z11.59 ENCOUNTER FOR SCREENING FOR OTHER VIRAL DISEASES: ICD-10-CM

## 2023-02-17 NOTE — TELEPHONE ENCOUNTER
Caller: Roney Kuhn    Relationship: Self    Best call back number: [phone]     What is the best time to reach you: ANY    Who are you requesting to speak with (clinical staff, provider,  specific staff member): DR. PERKINS OR MA    What was the call regarding: PATIENT DROPPED OFF LAB RESULTS THAT REPORT HE IS POSITIVE FOR HEPATITIS - PATIENT WOULD LIKE TO DISCUSS THESE RESULTS WITH CLINICAL STAFF.     Do you require a callback: YES

## 2023-02-20 NOTE — TELEPHONE ENCOUNTER
HUB TO READ: DR. PERKINS WILL BE BACK IN THE OFFICE TOMORROW (Tuesday 02/21/2023) AND DR. PERKINS WILL REVIEW AND I WILL CALL PATIENT BACK.

## 2023-02-22 DIAGNOSIS — Z11.59 ENCOUNTER FOR SCREENING FOR OTHER VIRAL DISEASES: ICD-10-CM

## 2023-02-22 DIAGNOSIS — R76.8 HEPATITIS B ANTIBODY POSITIVE: Primary | ICD-10-CM

## 2023-02-22 NOTE — TELEPHONE ENCOUNTER
DR. PERKINS WANTS PATIENT TO HAVE REPEAT LABS, A LIVER ULTRASOUND, AND A REFERRAL TO GASTRO. ALL ORDERS HAVE BEEN ENTERED AND PATIENT IS GOING TO CALL SCHEDULING [phone] TO SET UP ULTRASOUND AND HE IS GOING TO CALL [facility] [phone] TO SEE HOW THEY GO ABOUT SCHEDULING APPOINTMENTS. HE WILL HAVE THE LABS DONE WHEN HE HAS THE LIVER US DONE. I ADVISED PATIENT WE WOULD CALL HIM AFTER WE GOT THE LIVER US AND LAB RESULTS BACK, AND GO FROM THERE. PT VOICED UNDERSTANDING.

## 2023-02-23 ENCOUNTER — TELEPHONE (OUTPATIENT)
Dept: FAMILY MEDICINE CLINIC | Facility: CLINIC | Age: 73
End: 2023-02-23

## 2023-02-23 NOTE — TELEPHONE ENCOUNTER
WOULD YOU PLEASE CALL PATIENT AND TELL HIM HE WILL NEED AN APPT. HE HAS AN U/S SET ON 02/28/2023, MAYBE A FEW DAYS AFTER THAT? THANK YOU!

## 2023-02-23 NOTE — TELEPHONE ENCOUNTER
Caller: Roney Kuhn    Relationship to patient: Self    Best call back number:     Patient is needing: PATIENT STATES ON 2/6, HE DROPPED OFF PAPERWORK FROM THE [facility] REFLECTING THAT HE WAS POSITIVE FOR HEPATITIS.  PATIENT STATES HE WAS TOLD TO SCHEDULE WITH A GASTROENTEROLOGIST, BUT FIRST AVAILABLE IS NOT UNTIL 3/27.  PATIENT STATES HE HAS A LOT OF QUESTIONS ABOUT THIS NEW DIAGNOSIS AND WOULD LIKE A CALL BACK FROM DR. PERKINS OR HIS MA.  HE WOULD LIKE TO KNOW IF HE IS SAFE TO WAIT UNTIL 3/27 TO BE EVALUATED BY A GASTROENTEROLOGIST.  HE WOULD LIKE TO KNOW IF HE IS CONTAGIOUS.  HE WOULD ALSO LIKE TO KNOW IF THERE ARE THINGS HE SHOULD OR SHOULD BE DOING.    PATIENT HAS A LIVER ULTRASOUND SCHEDULED ON 2/28 PER DR. PERKINS'S RECOMMENDATION, AND HE WOULD LIKE FOR DR. PERKINS TO PLEASE REVIEW THOSE RESULTS AFTERWARDS SO THAT HE CAN REVIEW THOSE RESULTS ALONG WITH THE BLOOD WORK FROM [facility].    PATIENT STATES HE IS NOT HAVING NAUSEA, PAIN NOR HAS  HE NOTICED HIS SKIN TURNING YELLOWISH.       PLEASE CONTACT PATIENT AS SOON AS POSSIBLE TO PROVIDE HIM THIS INFORMATION.

## 2023-02-24 NOTE — TELEPHONE ENCOUNTER
CALLED PT AND [facility] PER PT NEEDS TO SCHEDULE APPT TO DISCUSS ALL OF HIS CONCERNS AND TO FOLLOW UP ON PT'S U/S THAT IS SET FOR 02/28/2023    HUB TO READ:  PT NEEDS TO SCHEDULE APPT TO DISCUSS ALL OF HIS CONCERNS AND TO FOLLOW UP ON PT'S U/S THAT IS SET FOR 02/28/2023

## 2023-02-28 ENCOUNTER — LAB (OUTPATIENT)
Dept: LAB | Facility: HOSPITAL | Age: 73
End: 2023-02-28
Payer: MEDICARE

## 2023-02-28 ENCOUNTER — HOSPITAL ENCOUNTER (OUTPATIENT)
Dept: ULTRASOUND IMAGING | Facility: HOSPITAL | Age: 73
Discharge: HOME | End: 2023-02-28
Payer: MEDICARE

## 2023-02-28 DIAGNOSIS — R76.8 HEPATITIS B CORE ANTIBODY POSITIVE: ICD-10-CM

## 2023-02-28 LAB — HBV CORE IGM SERPL QL IA: NORMAL

## 2023-02-28 PROCEDURE — 80074 ACUTE HEPATITIS PANEL: CPT | Performed by: INTERNAL MEDICINE

## 2023-02-28 PROCEDURE — 76705 ECHO EXAM OF ABDOMEN: CPT

## 2023-02-28 PROCEDURE — 86706 HEP B SURFACE ANTIBODY: CPT | Performed by: INTERNAL MEDICINE

## 2023-03-02 ENCOUNTER — TELEPHONE (OUTPATIENT)
Dept: FAMILY MEDICINE CLINIC | Facility: CLINIC | Age: 73
End: 2023-03-02

## 2023-03-02 NOTE — TELEPHONE ENCOUNTER
Caller: Roney Kuhn    Relationship: Self    Best call back number: [phone]*    Caller requesting test results: PATIENT    What test was performed: LIVER SCAN, BLOOD WORK    When was the test performed: 2/28/23    Where was the test performed: [facility]    Additional notes: PATIENT CALLING TO LET DR. PERKINS KNOW THAT THE LIVER SCAN IS AVAILABLE, THE PATIENT SAW THIS VIA [facility] BUT CANNOT SEE ANY RESULTS.  PATIENT REQUESTING A CALL BACK WITH RESULTS OF LIVER SCAN AND LABS.

## 2023-03-02 NOTE — TELEPHONE ENCOUNTER
HUB TO READ: CALLED AND LEFT MESSAGE FOR PT ADVISING THAT DR. PERKINS WILL GO OVER RESULTS AT HIS VISIT NEXT WEEK.

## 2023-03-07 ENCOUNTER — TELEPHONE (OUTPATIENT)
Dept: FAMILY MEDICINE CLINIC | Facility: CLINIC | Age: 73
End: 2023-03-07

## 2023-03-07 NOTE — TELEPHONE ENCOUNTER
"Hub staff attempted to follow warm transfer process and was unsuccessful     Caller: Roney Kuhn \"MARIANELA\"    Relationship to patient: Self    Best call back number: [phone] (Mobile    Patient is needing: PATIENT RETURNED CALL TO RESCHEDULE MISSED APPOINTMENT DUE TO OFFICE CLOSURE.    UNABLE TO TRANSFER.    RESCHEDULED FOR 3-8-23 AT 9:30AM AT PATIENT REQUEST.    HE WOULD ALSO BE AGREEABLE TO VIRTUAL VISIT IF NEEDED.  HE IS ANXIOUS TO DISCUSS RECENT TEST RESULTS.    PLEASE CALL TO RESCHEDULE IF ABOVE NOT APPROPRIATE.       "

## 2023-03-08 ENCOUNTER — OFFICE VISIT (OUTPATIENT)
Dept: FAMILY MEDICINE CLINIC | Facility: CLINIC | Age: 73
End: 2023-03-08
Payer: MEDICARE

## 2023-03-08 VITALS
WEIGHT: 196 LBS | BODY MASS INDEX: 25.15 KG/M2 | DIASTOLIC BLOOD PRESSURE: 82 MMHG | OXYGEN SATURATION: 100 % | RESPIRATION RATE: 16 BRPM | SYSTOLIC BLOOD PRESSURE: 140 MMHG | HEIGHT: 74 IN | HEART RATE: 78 BPM

## 2023-03-08 DIAGNOSIS — I10 PRIMARY HYPERTENSION: Primary | ICD-10-CM

## 2023-03-08 PROBLEM — R89.9 ABNORMAL LABORATORY TEST RESULT: Status: ACTIVE | Noted: 2023-03-08

## 2023-03-08 PROCEDURE — 3077F SYST BP >= 140 MM HG: CPT | Performed by: INTERNAL MEDICINE

## 2023-03-08 PROCEDURE — 3079F DIAST BP 80-89 MM HG: CPT | Performed by: INTERNAL MEDICINE

## 2023-03-08 PROCEDURE — 99213 OFFICE O/P EST LOW 20 MIN: CPT | Performed by: INTERNAL MEDICINE

## 2023-03-08 NOTE — PROGRESS NOTES
Jessica Kuhn is a 73 y.o. male. Patient is here today for follow-up on abnormal laboratory testing from the [facility].  Patient had some indications of a possible hepatitis B infection although he has had no symptoms at all.  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  He is having no GI symptoms at all.  I ordered repeat blood test and an abdominal ultrasound and he is here to review the results.     No chief complaint on file.         Vitals:    03/08/23 0927   BP: 140/82   Pulse: 78   Resp: 16   SpO2: 100%     Body mass index is 25.16 kg/m².  The following portions of the patient's history were reviewed and updated as appropriate: allergies, current medications, past family history, past medical history, past social history, past surgical history and problem list.    Past Medical History:   Diagnosis Date   • Back problem    • Basal cell carcinoma    • Claustrophobia    • Hyperlipidemia    • Hypertension    • Kidney stones       No Known Allergies   Social History     Socioeconomic History   • Marital status:    Tobacco Use   • Smoking status: Never   • Smokeless tobacco: Never   Vaping Use   • Vaping Use: Never used   Substance and Sexual Activity   • Alcohol use: Yes   • Drug use: No   • Sexual activity: Defer        Current Outpatient Medications:   •  lisinopril-hydrochlorothiazide (PRINZIDE,ZESTORETIC) 20-12.5 MG per tablet, Take 2 tablets by mouth Daily., Disp: 180 tablet, Rfl: 3  •  rosuvastatin (CRESTOR) 5 MG tablet, Take 1 tablet by mouth Daily., Disp: 90 tablet, Rfl: 3  •  traZODone (DESYREL) 50 MG tablet, Take 1 tablet by mouth Every Night., Disp: 90 tablet, Rfl: 1  •  benzonatate (Tessalon Perles) 100 MG capsule, Take 1 capsule by mouth 3 (Three) Times a Day As Needed for Cough., Disp: 30 capsule, Rfl: 0     Objective     History of Present Illness     Review of Systems    Physical Exam  Vitals and nursing note reviewed.    Constitutional:       General: He is not in acute distress.     Appearance: Normal appearance. He is not ill-appearing.   HENT:      Head: Normocephalic and atraumatic.   Cardiovascular:      Rate and Rhythm: Normal rate and regular rhythm.      Heart sounds: Normal heart sounds.   Pulmonary:      Effort: Pulmonary effort is normal. No respiratory distress.      Breath sounds: Normal breath sounds. No wheezing or rales.   Abdominal:      General: Abdomen is flat.      Palpations: Abdomen is soft.   Neurological:      Mental Status: He is alert.         ASSESSMENT hepatitis C screen was negative and hepatitis A antibody was negative.  Hepatitis B surface antibody was nonreactive and surface antigen was negative and hepatitis B core antibody was nonreactive.  Ultrasound of the liver shows coarsened texture to the liver but no tumor or cirrhosis  #1-the abnormal testing from the [facility] indicating possible hepatitis B infection, asymptomatic       Problems Addressed this Visit        Cardiac and Vasculature    Hypertension - Primary   Diagnoses       Codes Comments    Primary hypertension    -  Primary ICD-10-CM: I10  ICD-9-CM: 401.9           PLAN patient has GI appointment scheduled in April already.  He is already scheduled for follow-up with me in August with labs and will keep that appointment    There are no Patient Instructions on file for this visit.  No follow-ups on file.

## 2023-04-10 ENCOUNTER — OFFICE VISIT (OUTPATIENT)
Dept: GASTROENTEROLOGY | Facility: CLINIC | Age: 73
End: 2023-04-10
Payer: MEDICARE

## 2023-04-10 VITALS
OXYGEN SATURATION: 97 % | HEART RATE: 72 BPM | HEIGHT: 74 IN | DIASTOLIC BLOOD PRESSURE: 78 MMHG | TEMPERATURE: 97.3 F | SYSTOLIC BLOOD PRESSURE: 154 MMHG | WEIGHT: 193.7 LBS | BODY MASS INDEX: 24.86 KG/M2

## 2023-04-10 DIAGNOSIS — K74.00 HEPATIC FIBROSIS, UNSPECIFIED: ICD-10-CM

## 2023-04-10 DIAGNOSIS — K76.89 OTHER SPECIFIED DISEASES OF LIVER: ICD-10-CM

## 2023-04-10 DIAGNOSIS — R93.2 ABNORMAL ULTRASOUND OF LIVER: Primary | ICD-10-CM

## 2023-04-10 DIAGNOSIS — E78.5 HYPERLIPIDEMIA, UNSPECIFIED HYPERLIPIDEMIA TYPE: ICD-10-CM

## 2023-04-10 PROCEDURE — 3077F SYST BP >= 140 MM HG: CPT | Performed by: INTERNAL MEDICINE

## 2023-04-10 PROCEDURE — 99204 OFFICE O/P NEW MOD 45 MIN: CPT | Performed by: INTERNAL MEDICINE

## 2023-04-10 PROCEDURE — 3078F DIAST BP <80 MM HG: CPT | Performed by: INTERNAL MEDICINE

## 2023-04-10 PROCEDURE — 1160F RVW MEDS BY RX/DR IN RCRD: CPT | Performed by: INTERNAL MEDICINE

## 2023-04-10 PROCEDURE — 1159F MED LIST DOCD IN RCRD: CPT | Performed by: INTERNAL MEDICINE

## 2023-04-10 NOTE — PROGRESS NOTES
Chief Complaint   Patient presents with   • HX of ABN Lab work     Discuss needed c scope   • ABN US Liver     Subjective   HPI  Roney Kuhn is a 73 y.o. male who presents today for new patient evaluation.  Referred by PCP for evaluation of abnormal hepatitis B serology.  Patient donated blood on 31 January and later received notification from the [facility] that he had abnormal HBV serology.  I reviewed multiple correspondences from the [facility] that the patient brought with him today he had a positive hepatitis B core antibody total with a negative core IgM and negative surface antigen and a negative surface antibody.  His HBV LILIYA was nonreactive.  He has historically had normal liver enzymes.  He has no obvious risk factors for hepatitis B.  He reports he had donated blood maybe 2 weeks prior to that episode and had not received any correspondence about abnormalities with that episode.    He did have repeat hepatitis B serology performed at the end of February which I reviewed and was all completely normal.  His HBV core antibody was negative.  He did have an ultrasound of the liver noted below.    US liver 2023:  IMPRESSION:  Coarsened hepatic echotexture, compatible with  hepatocellular disease, without convincing evidence of cirrhosis and  without mass seen in visualized portions of the liver.      Objective   Vitals:    04/10/23 1249   BP: 154/78   Pulse: 72   Temp: 97.3 °F (36.3 °C)   SpO2: 97%     Physical Exam  Vitals reviewed.   Constitutional:       Appearance: He is well-developed.   HENT:      Head: Normocephalic and atraumatic.   Neurological:      Mental Status: He is alert and oriented to person, place, and time.   Psychiatric:         Behavior: Behavior normal.         Thought Content: Thought content normal.         Judgment: Judgment normal.       The following data was reviewed by: Dilan Mckeon MD on 04/10/2023:  CMP    CMP 1/31/23   Glucose 120 (A)   BUN 17   Creatinine 0.88    Sodium 141   Potassium 4.0   Chloride 103   Calcium 9.7   Total Protein 6.1   Albumin 4.2   Globulin 1.9   Total Bilirubin 0.6   Alkaline Phosphatase 49   AST (SGOT) 15   ALT (SGPT) 15   BUN/Creatinine Ratio 19.3   (A) Abnormal value            US liver from 2/2023 reviewed and personally interpreted  Multiple labs and written correspondence from [facility] reviewed    Assessment & Plan   Assessment:     1. Abnormal ultrasound of liver    2. Abnormal HBV serology     Plan:   Patient's recent HBV serology from blood donation is suggestive of a false positive hepatitis B core antibody.  He had nonreactive LILIYA at that time and has subsequently had repeat serology with a negative HBV core antibody.  He is never had any elevation of his liver function test and has no risk factors for hepatitis B.  He did have some nonspecific coarsening of the hepatic echotexture on ultrasound possibly representing some underlying NAFLD.  We will check a FibroSure today and a ultra quantitative HBV DNA but assuming those are unremarkable then no further work-up will be necessary and he can follow-up with his primary care physician as scheduled.          Dilan Mckeon M.D.  [facility]  [address] [place] [address]  Office: [phone]

## 2023-04-11 LAB
HBV DNA SERPL NAA+PROBE-ACNC: NORMAL IU/ML
HBV DNA SERPL NAA+PROBE-LOG IU: NORMAL LOG10 IU/ML
REF LAB TEST REF RANGE: NORMAL

## 2023-04-12 LAB
A2 MACROGLOB SERPL-MCNC: 151 MG/DL (ref 110–276)
ALT SERPL W P-5'-P-CCNC: 19 IU/L (ref 0–55)
APO A-I SERPL-MCNC: 200 MG/DL (ref 101–178)
AST SERPL W P-5'-P-CCNC: 24 IU/L (ref 0–40)
BILIRUB SERPL-MCNC: 0.2 MG/DL (ref 0–1.2)
CHOLEST SERPL-MCNC: 190 MG/DL (ref 100–199)
FIBROSIS SCORING:: ABNORMAL
FIBROSIS STAGE SERPL QL: ABNORMAL
GGT SERPL-CCNC: 13 IU/L (ref 0–65)
GLUCOSE SERPL-MCNC: 85 MG/DL (ref 70–99)
HAPTOGLOB SERPL-MCNC: 95 MG/DL (ref 34–355)
INTERPRETATIONS: (REFERENCE): ABNORMAL
LABORATORY COMMENT REPORT: ABNORMAL
LIVER FIBR SCORE SERPL CALC.FIBROSURE: 0.07 (ref 0–0.21)
NASH SCORING (REFERENCE): ABNORMAL
NECROINFLAMMATORY ACT GRADE SERPL QL: ABNORMAL
NECROINFLAMMATORY ACT SCORE SERPL: 0.25
SERVICE CMNT-IMP: ABNORMAL
STEATOSIS GRADE (REFERENCE): ABNORMAL
STEATOSIS GRADING (REFERENCE): ABNORMAL
STEATOSIS SCORE (REFERENCE): 0.13 (ref 0–0.3)
TRIGL SERPL-MCNC: 48 MG/DL (ref 0–149)

## 2023-04-23 NOTE — PROGRESS NOTES
Fibrosure was normal no inflammation or scarring of liver  His HBV DNA was negative    No further workup necessary from my standpoint

## 2023-08-11 LAB
ALBUMIN SERPL-MCNC: 4 G/DL (ref 3.5–5.2)
ALBUMIN/GLOB SERPL: 2.1 G/DL
ALP SERPL-CCNC: 56 U/L (ref 39–117)
ALT SERPL-CCNC: 15 U/L (ref 1–41)
AST SERPL-CCNC: 20 U/L (ref 1–40)
BILIRUB SERPL-MCNC: 0.4 MG/DL (ref 0–1.2)
BUN SERPL-MCNC: 14 MG/DL (ref 8–23)
BUN/CREAT SERPL: 13.9 (ref 7–25)
CALCIUM SERPL-MCNC: 9.2 MG/DL (ref 8.6–10.5)
CHLORIDE SERPL-SCNC: 102 MMOL/L (ref 98–107)
CHOLEST SERPL-MCNC: 167 MG/DL (ref 0–200)
CO2 SERPL-SCNC: 28.8 MMOL/L (ref 22–29)
CREAT SERPL-MCNC: 1.01 MG/DL (ref 0.76–1.27)
EGFRCR SERPLBLD CKD-EPI 2021: 77.6 ML/MIN/1.73
GLOBULIN SER CALC-MCNC: 1.9 GM/DL
GLUCOSE SERPL-MCNC: 116 MG/DL (ref 65–99)
HDLC SERPL-MCNC: 86 MG/DL (ref 40–60)
LDLC SERPL CALC-MCNC: 70 MG/DL (ref 0–100)
LDLC/HDLC SERPL: 0.81 {RATIO}
POTASSIUM SERPL-SCNC: 3.9 MMOL/L (ref 3.5–5.2)
PROT SERPL-MCNC: 5.9 G/DL (ref 6–8.5)
SODIUM SERPL-SCNC: 140 MMOL/L (ref 136–145)
TRIGL SERPL-MCNC: 55 MG/DL (ref 0–150)
VLDLC SERPL CALC-MCNC: 11 MG/DL (ref 5–40)

## 2023-08-15 ENCOUNTER — OFFICE VISIT (OUTPATIENT)
Dept: FAMILY MEDICINE CLINIC | Facility: CLINIC | Age: 73
End: 2023-08-15
Payer: MEDICARE

## 2023-08-15 VITALS
WEIGHT: 190 LBS | SYSTOLIC BLOOD PRESSURE: 140 MMHG | HEIGHT: 74 IN | OXYGEN SATURATION: 98 % | TEMPERATURE: 97.5 F | BODY MASS INDEX: 24.38 KG/M2 | DIASTOLIC BLOOD PRESSURE: 62 MMHG | RESPIRATION RATE: 16 BRPM | HEART RATE: 102 BPM

## 2023-08-15 DIAGNOSIS — R73.9 HYPERGLYCEMIA: ICD-10-CM

## 2023-08-15 DIAGNOSIS — F51.01 PRIMARY INSOMNIA: ICD-10-CM

## 2023-08-15 DIAGNOSIS — J06.9 ACUTE URI: ICD-10-CM

## 2023-08-15 DIAGNOSIS — R05.9 COUGH, UNSPECIFIED TYPE: ICD-10-CM

## 2023-08-15 DIAGNOSIS — R68.83 CHILLS: ICD-10-CM

## 2023-08-15 DIAGNOSIS — I10 PRIMARY HYPERTENSION: Primary | ICD-10-CM

## 2023-08-15 DIAGNOSIS — E78.00 HYPERCHOLESTEROLEMIA: ICD-10-CM

## 2023-08-15 LAB
EXPIRATION DATE: NORMAL
INTERNAL CONTROL: NORMAL
Lab: NORMAL
SARS-COV-2 AG UPPER RESP QL IA.RAPID: NOT DETECTED

## 2023-08-15 PROCEDURE — 99214 OFFICE O/P EST MOD 30 MIN: CPT | Performed by: INTERNAL MEDICINE

## 2023-08-15 PROCEDURE — 3077F SYST BP >= 140 MM HG: CPT | Performed by: INTERNAL MEDICINE

## 2023-08-15 PROCEDURE — 3078F DIAST BP <80 MM HG: CPT | Performed by: INTERNAL MEDICINE

## 2023-08-15 PROCEDURE — 87426 SARSCOV CORONAVIRUS AG IA: CPT | Performed by: INTERNAL MEDICINE

## 2023-08-15 NOTE — PROGRESS NOTES
Subjective   Roney Kuhn is a 73 y.o. male. Patient is here today for follow-up on his hypertension, hyperlipidemia and insomnia.  Patient is generally been stable.  However for the last 3 days he has had some malaise, myalgias, possible low-grade fever and some cough.  He does not feel terribly ill.  He was at a large convention recently.  Chief Complaint   Patient presents with    Hypertension    Cough    Chills     Sweating/headaches since Friday    Edema     Right calf          Vitals:    08/15/23 1017   BP: 140/62   Pulse: 102   Resp: 16   Temp: 97.5 øF (36.4 øC)   SpO2: 98%     Body mass index is 24.38 kg/mý.  The following portions of the patient's history were reviewed and updated as appropriate: allergies, current medications, past family history, past medical history, past social history, past surgical history and problem list.    Past Medical History:   Diagnosis Date    Back problem     Basal cell carcinoma     Claustrophobia     Hyperlipidemia     Hypertension     Kidney stones       No Known Allergies   Social History     Socioeconomic History    Marital status:    Tobacco Use    Smoking status: Never    Smokeless tobacco: Never   Vaping Use    Vaping Use: Never used   Substance and Sexual Activity    Alcohol use: Yes     Comment: moderate    Drug use: No    Sexual activity: Defer        Current Outpatient Medications:     lisinopril-hydrochlorothiazide (PRINZIDE,ZESTORETIC) 20-12.5 MG per tablet, Take 2 tablets by mouth Daily., Disp: 180 tablet, Rfl: 3    rosuvastatin (CRESTOR) 5 MG tablet, Take 1 tablet by mouth Daily., Disp: 90 tablet, Rfl: 3    traZODone (DESYREL) 50 MG tablet, Take 1 tablet by mouth Every Night., Disp: 90 tablet, Rfl: 1     Objective     History of Present Illness     Review of Systems    Physical Exam  Vitals and nursing note reviewed.   Constitutional:       General: He is not in acute distress.     Appearance: Normal appearance. He is not ill-appearing.   HENT:       Head: Normocephalic and atraumatic.   Cardiovascular:      Rate and Rhythm: Normal rate and regular rhythm.      Heart sounds: Normal heart sounds.   Pulmonary:      Effort: Pulmonary effort is normal. No respiratory distress.      Breath sounds: Normal breath sounds. No wheezing or rales.   Skin:     General: Skin is warm and dry.   Neurological:      General: No focal deficit present.      Mental Status: He is alert and oriented to person, place, and time.   Psychiatric:         Mood and Affect: Mood normal.         Behavior: Behavior normal.       Assessment    ASSESSMENT the patient is afebrile and pulse oximetry is normal.  COVID-19 testing was negative.  Lipid panel has total cholesterol 167, HDL 86 and LDL 70.  CMP has a sugar of 116, total protein of 5.9 and was otherwise normal.  #1-upper respiratory infection, probably viral  #2-hypertension controlled on medication  #3-hyperlipidemia controlled on medication  #4-hyperglycemia, asymptomatic    Problems Addressed this Visit          Cardiac and Vasculature    Hypertension - Primary    Relevant Orders    Lipid Panel With LDL / HDL Ratio (Completed)    Comprehensive Metabolic Panel (Completed)    Hypercholesterolemia    Relevant Orders    Lipid Panel With LDL / HDL Ratio (Completed)       Endocrine and Metabolic    Hyperglycemia       Sleep    Primary insomnia     Other Visit Diagnoses       Cough, unspecified type        Relevant Orders    POCT SARS-CoV-2 Antigen DANIELLE (Completed)    Chills        Relevant Orders    POCT SARS-CoV-2 Antigen DANIELLE (Completed)    Acute URI              Diagnoses         Codes Comments    Primary hypertension    -  Primary ICD-10-CM: I10  ICD-9-CM: 401.9     Hypercholesterolemia     ICD-10-CM: E78.00  ICD-9-CM: 272.0     Cough, unspecified type     ICD-10-CM: R05.9  ICD-9-CM: 786.2     Chills     ICD-10-CM: R68.83  ICD-9-CM: 780.64     Hyperglycemia     ICD-10-CM: R73.9  ICD-9-CM: 790.29     Primary insomnia     ICD-10-CM:  F51.01  ICD-9-CM: 307.42     Acute URI     ICD-10-CM: J06.9  ICD-9-CM: 465.9             PLAN the patient will continue current medicines as now.  He can use Tylenol and over-the-counter cough medicine and treat his upper respiratory infection symptomatically.  He should be rechecked in about 6 months with a CBC, CMP, lipid panel, hemoglobin A1c, TSH and free T4 and PSA    There are no Patient Instructions on file for this visit.  No follow-ups on file.

## 2023-08-30 ENCOUNTER — OFFICE VISIT (OUTPATIENT)
Dept: FAMILY MEDICINE CLINIC | Facility: CLINIC | Age: 73
End: 2023-08-30
Payer: MEDICARE

## 2023-08-30 ENCOUNTER — HOSPITAL ENCOUNTER (OUTPATIENT)
Dept: GENERAL RADIOLOGY | Facility: HOSPITAL | Age: 73
Discharge: HOME | End: 2023-08-30
Payer: MEDICARE

## 2023-08-30 ENCOUNTER — LAB (OUTPATIENT)
Dept: LAB | Facility: HOSPITAL | Age: 73
End: 2023-08-30
Payer: MEDICARE

## 2023-08-30 VITALS
BODY MASS INDEX: 23.41 KG/M2 | RESPIRATION RATE: 12 BRPM | SYSTOLIC BLOOD PRESSURE: 120 MMHG | WEIGHT: 182.4 LBS | DIASTOLIC BLOOD PRESSURE: 62 MMHG | TEMPERATURE: 97.1 F | OXYGEN SATURATION: 99 % | HEIGHT: 74 IN | HEART RATE: 82 BPM

## 2023-08-30 DIAGNOSIS — M79.10 MYALGIA: ICD-10-CM

## 2023-08-30 DIAGNOSIS — R63.4 WEIGHT LOSS, NON-INTENTIONAL: ICD-10-CM

## 2023-08-30 DIAGNOSIS — R05.9 COUGH, UNSPECIFIED TYPE: ICD-10-CM

## 2023-08-30 DIAGNOSIS — R68.83 CHILLS: ICD-10-CM

## 2023-08-30 DIAGNOSIS — R05.2 SUBACUTE COUGH: Primary | ICD-10-CM

## 2023-08-30 LAB
ALBUMIN SERPL-MCNC: 3.6 G/DL (ref 3.5–5.2)
ALBUMIN/GLOB SERPL: 1.2 G/DL
ALP SERPL-CCNC: 74 U/L (ref 39–117)
ALT SERPL W P-5'-P-CCNC: 12 U/L (ref 1–41)
ANION GAP SERPL CALCULATED.3IONS-SCNC: 13 MMOL/L (ref 5–15)
AST SERPL-CCNC: 14 U/L (ref 1–40)
BASOPHILS # BLD AUTO: 0.04 10*3/MM3 (ref 0–0.2)
BASOPHILS NFR BLD AUTO: 0.8 % (ref 0–1.5)
BILIRUB SERPL-MCNC: <0.2 MG/DL (ref 0–1.2)
BUN SERPL-MCNC: 19 MG/DL (ref 8–23)
BUN/CREAT SERPL: 12.3 (ref 7–25)
CALCIUM SPEC-SCNC: 9.5 MG/DL (ref 8.6–10.5)
CHLORIDE SERPL-SCNC: 99 MMOL/L (ref 98–107)
CK SERPL-CCNC: 36 U/L (ref 20–200)
CO2 SERPL-SCNC: 26 MMOL/L (ref 22–29)
CREAT SERPL-MCNC: 1.54 MG/DL (ref 0.76–1.27)
CRP SERPL-MCNC: 6.9 MG/DL (ref 0–0.5)
DEPRECATED RDW RBC AUTO: 40.3 FL (ref 37–54)
EGFRCR SERPLBLD CKD-EPI 2021: 46.7 ML/MIN/1.73
EOSINOPHIL # BLD AUTO: 0.28 10*3/MM3 (ref 0–0.4)
EOSINOPHIL NFR BLD AUTO: 5.9 % (ref 0.3–6.2)
ERYTHROCYTE [DISTWIDTH] IN BLOOD BY AUTOMATED COUNT: 12.5 % (ref 12.3–15.4)
ERYTHROCYTE [SEDIMENTATION RATE] IN BLOOD: 46 MM/HR (ref 0–20)
EXPIRATION DATE: NORMAL
FLUAV AG UPPER RESP QL IA.RAPID: NOT DETECTED
FLUBV AG UPPER RESP QL IA.RAPID: NOT DETECTED
GLOBULIN UR ELPH-MCNC: 3 GM/DL
GLUCOSE SERPL-MCNC: 126 MG/DL (ref 65–99)
HCT VFR BLD AUTO: 35.6 % (ref 37.5–51)
HGB BLD-MCNC: 12.6 G/DL (ref 13–17.7)
IMM GRANULOCYTES # BLD AUTO: 0.01 10*3/MM3 (ref 0–0.05)
IMM GRANULOCYTES NFR BLD AUTO: 0.2 % (ref 0–0.5)
INTERNAL CONTROL: NORMAL
LYMPHOCYTES # BLD AUTO: 0.49 10*3/MM3 (ref 0.7–3.1)
LYMPHOCYTES NFR BLD AUTO: 10.4 % (ref 19.6–45.3)
Lab: NORMAL
MCH RBC QN AUTO: 31.7 PG (ref 26.6–33)
MCHC RBC AUTO-ENTMCNC: 35.4 G/DL (ref 31.5–35.7)
MCV RBC AUTO: 89.4 FL (ref 79–97)
MONOCYTES # BLD AUTO: 0.59 10*3/MM3 (ref 0.1–0.9)
MONOCYTES NFR BLD AUTO: 12.5 % (ref 5–12)
NEUTROPHILS NFR BLD AUTO: 3.32 10*3/MM3 (ref 1.7–7)
NEUTROPHILS NFR BLD AUTO: 70.2 % (ref 42.7–76)
NRBC BLD AUTO-RTO: 0 /100 WBC (ref 0–0.2)
PLATELET # BLD AUTO: 394 10*3/MM3 (ref 140–450)
PMV BLD AUTO: 9.2 FL (ref 6–12)
POTASSIUM SERPL-SCNC: 3.9 MMOL/L (ref 3.5–5.2)
PROT SERPL-MCNC: 6.6 G/DL (ref 6–8.5)
RBC # BLD AUTO: 3.98 10*6/MM3 (ref 4.14–5.8)
SARS-COV-2 AG UPPER RESP QL IA.RAPID: NOT DETECTED
SODIUM SERPL-SCNC: 138 MMOL/L (ref 136–145)
WBC NRBC COR # BLD: 4.73 10*3/MM3 (ref 3.4–10.8)

## 2023-08-30 PROCEDURE — 36415 COLL VENOUS BLD VENIPUNCTURE: CPT | Performed by: INTERNAL MEDICINE

## 2023-08-30 PROCEDURE — 3074F SYST BP LT 130 MM HG: CPT | Performed by: INTERNAL MEDICINE

## 2023-08-30 PROCEDURE — 3078F DIAST BP <80 MM HG: CPT | Performed by: INTERNAL MEDICINE

## 2023-08-30 PROCEDURE — 85025 COMPLETE CBC W/AUTO DIFF WBC: CPT | Performed by: INTERNAL MEDICINE

## 2023-08-30 PROCEDURE — 71046 X-RAY EXAM CHEST 2 VIEWS: CPT

## 2023-08-30 PROCEDURE — 99214 OFFICE O/P EST MOD 30 MIN: CPT | Performed by: INTERNAL MEDICINE

## 2023-08-30 PROCEDURE — 82550 ASSAY OF CK (CPK): CPT | Performed by: INTERNAL MEDICINE

## 2023-08-30 PROCEDURE — 86140 C-REACTIVE PROTEIN: CPT | Performed by: INTERNAL MEDICINE

## 2023-08-30 PROCEDURE — 85652 RBC SED RATE AUTOMATED: CPT | Performed by: INTERNAL MEDICINE

## 2023-08-30 PROCEDURE — 87428 SARSCOV & INF VIR A&B AG IA: CPT | Performed by: INTERNAL MEDICINE

## 2023-08-30 PROCEDURE — 80053 COMPREHEN METABOLIC PANEL: CPT | Performed by: INTERNAL MEDICINE

## 2023-08-30 RX ORDER — IBUPROFEN 200 MG
400 TABLET ORAL EVERY 6 HOURS PRN
COMMUNITY

## 2023-08-30 RX ORDER — AZITHROMYCIN 250 MG/1
TABLET, FILM COATED ORAL
Qty: 6 TABLET | Refills: 0 | Status: SHIPPED | OUTPATIENT
Start: 2023-08-30

## 2023-08-30 NOTE — PROGRESS NOTES
Subjective   Roney Kuhn is a 73 y.o. male. Patient is here today for continuing symptoms.  Patient was at a convention and when he returned he had some cough malaise.  COVID testing was negative at that time.  He was felt to probably have a viral infection.  Since then he has continued with some myalgias in various areas, fatigue spotty decreased appetite and some weight loss as well as some continued cough and occasional upper back pain associated with cough that could be some pleurisy.  He has had no definite fever but has been sweaty at times.  Chief Complaint   Patient presents with    URI          Vitals:    08/30/23 1118   BP: 120/62   Pulse: 82   Resp: 12   Temp: 97.1 øF (36.2 øC)   SpO2: 99%     Body mass index is 23.41 kg/mý.  The following portions of the patient's history were reviewed and updated as appropriate: allergies, current medications, past family history, past medical history, past social history, past surgical history and problem list.    Past Medical History:   Diagnosis Date    Back problem     Basal cell carcinoma     Claustrophobia     Hyperlipidemia     Hypertension     Kidney stones       No Known Allergies   Social History     Socioeconomic History    Marital status:    Tobacco Use    Smoking status: Never    Smokeless tobacco: Never   Vaping Use    Vaping Use: Never used   Substance and Sexual Activity    Alcohol use: Yes     Comment: moderate    Drug use: No    Sexual activity: Defer        Current Outpatient Medications:     ibuprofen (ADVIL,MOTRIN) 200 MG tablet, Take 2 tablets by mouth Every 6 (Six) Hours As Needed for Mild Pain., Disp: , Rfl:     lisinopril-hydrochlorothiazide (PRINZIDE,ZESTORETIC) 20-12.5 MG per tablet, Take 2 tablets by mouth Daily., Disp: 180 tablet, Rfl: 3    rosuvastatin (CRESTOR) 5 MG tablet, Take 1 tablet by mouth Daily., Disp: 90 tablet, Rfl: 3    traZODone (DESYREL) 50 MG tablet, Take 1 tablet by mouth Every Night., Disp: 90 tablet, Rfl:  1    azithromycin (Zithromax Z-Michael) 250 MG tablet, Take 2 tablets the first day, then 1 tablet daily for 4 days., Disp: 6 tablet, Rfl: 0     Objective     Back Pain  The current episode started 1 to 4 weeks ago. The problem occurs 2 to 4 times per day. The problem is unchanged. The pain is present in the lumbar spine and thoracic spine. The quality of the pain is described as aching. The pain does not radiate. The pain is Worse during the night. The symptoms are aggravated by bending, coughing, position and standing. Stiffness is present All day. Associated symptoms include headaches, leg pain, weakness and weight loss. Pertinent negatives include no abdominal pain, bladder incontinence, bowel incontinence, chest pain, dysuria, fever, numbness, paresis, paresthesias, pelvic pain, perianal numbness or tingling.      Review of Systems   Constitutional:  Positive for weight loss. Negative for fever.   Cardiovascular:  Negative for chest pain.   Gastrointestinal:  Negative for abdominal pain and bowel incontinence.   Genitourinary:  Negative for bladder incontinence, dysuria and pelvic pain.   Musculoskeletal:  Positive for back pain.   Neurological:  Positive for weakness and headaches. Negative for tingling, numbness and paresthesias.     Physical Exam  Vitals and nursing note reviewed.   Constitutional:       General: He is not in acute distress.     Appearance: Normal appearance. He is normal weight.   HENT:      Head: Normocephalic and atraumatic.   Cardiovascular:      Rate and Rhythm: Normal rate and regular rhythm.      Heart sounds: Normal heart sounds.   Pulmonary:      Effort: Pulmonary effort is normal. No respiratory distress.      Breath sounds: Normal breath sounds. No wheezing or rales.   Abdominal:      General: Abdomen is flat. Bowel sounds are normal. There is no distension.      Palpations: Abdomen is soft. There is no mass.      Tenderness: There is no abdominal tenderness. There is no right CVA  tenderness, left CVA tenderness, guarding or rebound.      Hernia: No hernia is present.   Musculoskeletal:         General: No tenderness.   Skin:     General: Skin is warm and dry.   Neurological:      General: No focal deficit present.      Mental Status: He is alert and oriented to person, place, and time.   Psychiatric:         Mood and Affect: Mood normal.         Behavior: Behavior normal.       Assessment    ASSESSMENT COVID testing was negative.    Influenza testing was negative.  The patient is afebrile and pulse oximetry continues quite normal  #1-cough with continuing symptoms of upper respiratory infection  #2-myalgias  #3-decreased appetite with some weight loss    Problems Addressed this Visit    None  Visit Diagnoses       Subacute cough    -  Primary    Relevant Orders    XR Chest PA & Lateral    CBC & Differential    Comprehensive Metabolic Panel    C-reactive Protein    Sedimentation Rate    Myalgia        Relevant Orders    CBC & Differential    CK    Comprehensive Metabolic Panel    C-reactive Protein    Sedimentation Rate    Weight loss, non-intentional        Relevant Orders    XR Chest PA & Lateral    Comprehensive Metabolic Panel    C-reactive Protein    Sedimentation Rate          Diagnoses         Codes Comments    Subacute cough    -  Primary ICD-10-CM: R05.2  ICD-9-CM: 786.2     Myalgia     ICD-10-CM: M79.10  ICD-9-CM: 729.1     Weight loss, non-intentional     ICD-10-CM: R63.4  ICD-9-CM: 783.21             PLAN I have ordered some laboratory studies to be drawn on the patient and have ordered a chest x-ray.  Pending those results, I am going to start the patient on a Z-Michael.  He should be rechecked in 1 to 2 weeks if his symptoms do not resolve    There are no Patient Instructions on file for this visit.  No follow-ups on file.   Detail Level: Detailed Depth Of Biopsy: dermis Was A Bandage Applied: Yes Size Of Lesion In Cm: 0 Biopsy Type: H and E Biopsy Method: Dermablade Anesthesia Type: 1% lidocaine with epinephrine Anesthesia Volume In Cc: 0.5 Hemostasis: Drysol Wound Care: Petrolatum Dressing: bandage Destruction After The Procedure: No Type Of Destruction Used: Curettage Curettage Text: The wound bed was treated with curettage after the biopsy was performed. Cryotherapy Text: The wound bed was treated with cryotherapy after the biopsy was performed. Electrodesiccation Text: The wound bed was treated with electrodesiccation after the biopsy was performed. Electrodesiccation And Curettage Text: The wound bed was treated with electrodesiccation and curettage after the biopsy was performed. Silver Nitrate Text: The wound bed was treated with silver nitrate after the biopsy was performed. Lab: [id number] Lab Facility: [id number] Consent: Written consent was obtained and risks were reviewed including but not limited to scarring, infection, bleeding, scabbing, incomplete removal, nerve damage and allergy to anesthesia. Post-Care Instructions: I reviewed with the patient in detail post-care instructions. Patient is to keep the biopsy site dry overnight, and then apply bacitracin twice daily until healed. Patient may apply hydrogen peroxide soaks to remove any crusting. Notification Instructions: Patient will be notified of biopsy results. However, patient instructed to call the office if not contacted within 2 weeks. Billing Type: Third-Party Bill Information: Selecting Yes will display possible errors in your note based on the variables you have selected. This validation is only offered as a suggestion for you. PLEASE NOTE THAT THE VALIDATION TEXT WILL BE REMOVED WHEN YOU FINALIZE YOUR NOTE. IF YOU WANT TO FAX A PRELIMINARY NOTE YOU WILL NEED TO TOGGLE THIS TO 'NO' IF YOU DO NOT WANT IT IN YOUR FAXED NOTE.

## 2023-08-31 ENCOUNTER — TELEPHONE (OUTPATIENT)
Dept: FAMILY MEDICINE CLINIC | Facility: CLINIC | Age: 73
End: 2023-08-31
Payer: MEDICARE

## 2023-08-31 NOTE — TELEPHONE ENCOUNTER
"  Caller: Roney Kuhn \"MARIANELA\"    Relationship: Self    Best call back number: [phone]*    What is the best time to reach you: ANYTIME    Who are you requesting to speak with (clinical staff, provider,  specific staff member): CLINICAL    What was the call regarding: PATIENT CALLING STATING THAT HE RECEIVED HIS LAB RESULTS VIA [facility], BUT WOULD LIKE A CALL BACK TO GO OVER THE LABS, ESPECIALLY THE ABNORMAL LABS.    Is it okay if the provider responds through [facility]: NO          "

## 2023-09-01 ENCOUNTER — TELEPHONE (OUTPATIENT)
Dept: FAMILY MEDICINE CLINIC | Facility: CLINIC | Age: 73
End: 2023-09-01
Payer: MEDICARE

## 2023-09-01 NOTE — TELEPHONE ENCOUNTER
LVM informing pt provider has not reviewed labs and we will update when he returns Tuesday.    , will you please review labs? Thanks

## 2023-09-01 NOTE — TELEPHONE ENCOUNTER
LVM informing pt PCP has not reviewed and is out of office. Will have him review and update Pt upon his return on Tuesday

## 2023-09-01 NOTE — TELEPHONE ENCOUNTER
"  Caller: Roney Kuhn \"MARIANELA\"    Relationship: Self    Best call back number: [phone]     What is the best time to reach you: ANY    Who are you requesting to speak with (clinical staff, provider,  specific staff member): CLINICAL STAFF    Do you know the name of the person who called:      What was the call regarding: PATIENT WOULD LIKE CALL BACK ON HIS LABS AND GO OVER WITH DR PERKINS SINCE THEY WAS ABNORMAL.      Is it okay if the provider responds through MyChart:           "

## 2023-09-05 ENCOUNTER — TELEPHONE (OUTPATIENT)
Dept: FAMILY MEDICINE CLINIC | Facility: CLINIC | Age: 73
End: 2023-09-05
Payer: MEDICARE

## 2023-09-05 DIAGNOSIS — M79.10 MYALGIA: ICD-10-CM

## 2023-09-05 DIAGNOSIS — R68.83 CHILLS: Primary | ICD-10-CM

## 2023-09-05 DIAGNOSIS — R63.4 WEIGHT LOSS, NON-INTENTIONAL: ICD-10-CM

## 2023-09-05 NOTE — TELEPHONE ENCOUNTER
Pt is calling about lab results, HUB transferred call for Pt to speak with you.    He said he can be reached at [phone]

## 2023-09-21 RX ORDER — TRAZODONE HYDROCHLORIDE 50 MG/1
TABLET ORAL
Qty: 90 TABLET | Refills: 1 | Status: SHIPPED | OUTPATIENT
Start: 2023-09-21

## 2024-04-10 ENCOUNTER — PATIENT ROUNDING (BHMG ONLY) (OUTPATIENT)
Dept: URGENT CARE | Facility: CLINIC | Age: 74
End: 2024-04-10
Payer: MEDICARE

## 2024-04-10 ENCOUNTER — OFFICE VISIT (OUTPATIENT)
Age: 74
End: 2024-04-10
Payer: MEDICARE

## 2024-04-10 VITALS
BODY MASS INDEX: 24.97 KG/M2 | SYSTOLIC BLOOD PRESSURE: 128 MMHG | DIASTOLIC BLOOD PRESSURE: 68 MMHG | HEIGHT: 73 IN | WEIGHT: 188.4 LBS | HEART RATE: 85 BPM

## 2024-04-10 DIAGNOSIS — I10 PRIMARY HYPERTENSION: ICD-10-CM

## 2024-04-10 DIAGNOSIS — I48.19 PERSISTENT ATRIAL FIBRILLATION: ICD-10-CM

## 2024-04-10 DIAGNOSIS — E78.00 HYPERCHOLESTEROLEMIA: ICD-10-CM

## 2024-04-10 DIAGNOSIS — J18.9 PNEUMONIA OF LEFT LOWER LOBE DUE TO INFECTIOUS ORGANISM: Primary | ICD-10-CM

## 2024-04-10 PROCEDURE — 93000 ELECTROCARDIOGRAM COMPLETE: CPT | Performed by: INTERNAL MEDICINE

## 2024-04-10 PROCEDURE — 3074F SYST BP LT 130 MM HG: CPT | Performed by: INTERNAL MEDICINE

## 2024-04-10 PROCEDURE — 3078F DIAST BP <80 MM HG: CPT | Performed by: INTERNAL MEDICINE

## 2024-04-10 PROCEDURE — 99204 OFFICE O/P NEW MOD 45 MIN: CPT | Performed by: INTERNAL MEDICINE

## 2024-04-10 PROCEDURE — 1160F RVW MEDS BY RX/DR IN RCRD: CPT | Performed by: INTERNAL MEDICINE

## 2024-04-10 PROCEDURE — 1159F MED LIST DOCD IN RCRD: CPT | Performed by: INTERNAL MEDICINE

## 2024-04-10 RX ORDER — METOPROLOL TARTRATE 50 MG/1
50 TABLET, FILM COATED ORAL 2 TIMES DAILY
Qty: 180 TABLET | Refills: 3 | Status: SHIPPED | OUTPATIENT
Start: 2024-04-10

## 2024-04-10 RX ORDER — APIXABAN 5 MG/1
5 TABLET, FILM COATED ORAL EVERY 12 HOURS SCHEDULED
Qty: 180 TABLET | Refills: 3 | Status: SHIPPED | OUTPATIENT
Start: 2024-04-10

## 2024-04-10 NOTE — PROGRESS NOTES
"      CARDIOLOGY    Tammy Mi MD    ENCOUNTER DATE:  04/10/2024    Roney Kuhn / 74 y.o. / male        CHIEF COMPLAINT / REASON FOR OFFICE VISIT     Atrial Fibrillation      HISTORY OF PRESENT ILLNESS       HPI    Roney Kuhn is a 74 y.o. male     This gentleman has hypertension and hyperlipidemia.  He started having a bad cough and ended up at an urgent care in [facility] where they diagnosed him with pneumonia and new onset atrial fibrillation.  He had an echocardiogram but I do not have the results.  He was started on metoprolol and Eliquis.  Heart rate is now well controlled.  He is not having any bleeding issues.  He is unaware of the atrial fibrillation.          REVIEW OF SYSTEMS     Review of Systems   Constitutional: Negative for chills, fever, weight gain and weight loss.   Cardiovascular:  Negative for leg swelling.   Respiratory:  Positive for cough. Negative for snoring and wheezing.    Hematologic/Lymphatic: Negative for bleeding problem. Does not bruise/bleed easily.   Skin:  Negative for color change.   Musculoskeletal:  Negative for falls, joint pain and myalgias.   Gastrointestinal:  Negative for melena.   Genitourinary:  Negative for hematuria.   Neurological:  Negative for excessive daytime sleepiness.   Psychiatric/Behavioral:  Negative for depression. The patient is not nervous/anxious.          VITAL SIGNS     Visit Vitals  /68   Pulse 85   Ht 185.4 cm (73\")   Wt 85.5 kg (188 lb 6.4 oz)   BMI 24.86 kg/m²         Wt Readings from Last 3 Encounters:   04/10/24 85.5 kg (188 lb 6.4 oz)   04/08/24 88.5 kg (195 lb)   08/30/23 82.7 kg (182 lb 6.4 oz)     Body mass index is 24.86 kg/m².      PHYSICAL EXAMINATION     Constitutional:       General: Not in acute distress.  Neck:      Vascular: No carotid bruit or JVD.   Pulmonary:      Effort: Pulmonary effort is normal.      Breath sounds: Normal breath sounds.   Cardiovascular:      Normal rate. Irregularly irregular " rhythm.      Murmurs: There is no murmur.   Psychiatric:         Mood and Affect: Mood and affect normal.           REVIEWED DATA       ECG 12 Lead    Date/Time: 4/10/2024 2:55 PM  Performed by: Tammy Mi MD    Authorized by: Tammy Mi MD  Comparison: compared with previous ECG from 4/1/2024  Comparison to previous ECG: HR is slower  Rhythm: atrial fibrillation  BPM: 85  Conduction: conduction normal  ST Segments: ST segments normal    Clinical impression: abnormal EKG            Lipid Panel          8/10/2023    09:37   Lipid Panel   Total Cholesterol 167    Triglycerides 55    HDL Cholesterol 86    VLDL Cholesterol 11    LDL Cholesterol  70    LDL/HDL Ratio 0.81        Lab Results   Component Value Date    GLUCOSE 126 (H) 08/30/2023    BUN 19 08/30/2023    CREATININE 1.54 (H) 08/30/2023    EGFRRESULT 77.6 08/10/2023    EGFR 46.7 (L) 08/30/2023    BCR 12.3 08/30/2023    K 3.9 08/30/2023    CO2 26.0 08/30/2023    CALCIUM 9.5 08/30/2023    PROTENTOTREF 5.9 (L) 08/10/2023    ALBUMIN 3.6 08/30/2023    BILITOT <0.2 08/30/2023    AST 14 08/30/2023    ALT 12 08/30/2023       ASSESSMENT & PLAN      Diagnosis Plan   1. Pneumonia of left lower lobe due to infectious organism  Ambulatory Referral to Family Practice      2. Persistent atrial fibrillation        3. Hypercholesterolemia        4. Primary hypertension            Persistent atrial fibrillation.  He is now rate-controlled.  He is anticoagulated with Eliquis with no bleeding issues.  I recommend continuing with metoprolol and Eliquis.  He had an echocardiogram in [facility], but I do not have the results at this time.  I am going to try to get a copy of this to make sure that he has a normally structural heart.  If so, I recommend that we consider a cardioversion after he has been anticoagulated for a full 4 weeks.  I am going to have him followup with Mikki or Liss to recheck his EKG, and if he is still in atrial fibrillation and if he is  interested, I recommend a cardioversion assuming he has not missed any doses of his Eliquis.  I recommend holding the metoprolol for 24 hours prior to the cardioversion.    Hypertension.  Blood pressure is well-controlled.  Hyperlipidemia.  Currently not on any medications.  Pneumonia.  This is likely the trigger for the atrial fibrillation.  He does not have a primary doctor.  I have referred him to see Dr. Suh.  We need to make sure that this pneumonia and his other medical issues are controlled.              Orders Placed This Encounter   Procedures    Ambulatory Referral to Family Practice     Referral Priority:   Urgent     Referral Type:   Consultation     Referral Reason:   Specialty Services Required     Referred to Provider:   Ysabel Suh MD     Requested Specialty:   Family Medicine     Number of Visits Requested:   1    ECG 12 Lead     This order was created via procedure documentation     Order Specific Question:   Release to patient     Answer:   Routine Release [[id number]]           MEDICATIONS         Discharge Medications            Accurate as of April 10, 2024  2:55 PM. If you have any questions, ask your nurse or doctor.                Changes to Medications        Instructions Start Date   Eliquis 5 MG tablet tablet  Generic drug: apixaban  What changed: See the new instructions.  Changed by: Tammy Mi MD   5 mg, Oral, Every 12 Hours Scheduled      metoprolol tartrate 50 MG tablet  Commonly known as: LOPRESSOR  What changed:   how much to take  how to take this  Changed by: Tammy Mi MD   50 mg, Oral, 2 Times Daily             Continue These Medications        Instructions Start Date   amoxicillin-clavulanate 875-125 MG per tablet  Commonly known as: AUGMENTIN   1 tablet, Oral, 2 Times Daily      azithromycin 250 MG tablet  Commonly known as: Zithromax Z-Michael   Take 2 tablets the first day, then 1 tablet daily for 4 days.      benzonatate 200 MG capsule  Commonly known as:  TESSALON   200 mg, Oral, 3 Times Daily PRN      doxycycline 100 MG tablet  Commonly known as: ADOXA       doxycycline 100 MG capsule  Commonly known as: MONODOX   100 mg, Oral, 2 Times Daily      ibuprofen 200 MG tablet  Commonly known as: ADVIL,MOTRIN   400 mg, Oral, Every 6 Hours PRN      lisinopril-hydrochlorothiazide 20-12.5 MG per tablet  Commonly known as: PRINZIDE,ZESTORETIC   2 tablets, Oral, Daily      methylPREDNISolone 4 MG tablet  Commonly known as: MEDROL       rosuvastatin 5 MG tablet  Commonly known as: CRESTOR   5 mg, Oral, Daily      traZODone 50 MG tablet  Commonly known as: DESYREL   TAKE ONE TABLET BY MOUTH ONCE NIGHTLY                 Tammy Mi MD  04/10/24  14:55 EDT    Part of this note may be an electronic transcription/translation of spoken language to printed text using the Dragon dictation system.

## 2024-04-10 NOTE — PROGRESS NOTES
RM:________    Referral Provider: Sumaya Og APRN Provider, No Known    NEW PATIENT/ CONSULT  PREVIOUS CARDIOLOGIST: ______________________________    CARDIAC TESTING: __________________________________________________    : 1950   AGE: 74 y.o.    04/10/2024  REASON FOR VISIT/  CC:      WT: ____________ BP: __________L __________R/ HR_______________    ALLERGIES:  Patient has no known allergies.  SMOKING HISTORY  Social History     Tobacco Use    Smoking status: Never    Smokeless tobacco: Never   Vaping Use    Vaping status: Never Used   Substance Use Topics    Alcohol use: Yes     Comment: moderate    Drug use: No          H/O: MI_____   STROKE________   GOUT_____   ANEMIA______     CAROTID________ HIV____ CAD_______ HYPERCHOL _____    H/O: CHF _____   RF____ DM___ HTN_______PVD____THYROID DISEASE_______    PMH: GI ____   HEPATITIS ___ KIDNEY DISEASE ___ LUNG DISEASE _______     SLEEP APNEA ____ BLOOD CLOTS ____ DVT ____ VEIN STRIPPING ___________      STOP BANG _________ (CARDIO ONCOLOGY ONLY)    CANCER _________________________________ CHEMO/ RADIATION__________

## 2024-04-10 NOTE — ED NOTES
Thank you for letting us care for you during your recent visit to [facility]. We would love to hear about your experience with us. Was this the first time you have visited our location?    We’re always looking for ways to make our patients’ experiences even better. Do you have any recommendations on ways we may improve?    I appreciate you taking the time to respond. Please be on the lookout for a survey about your recent visit from [employer] via text or email. We would greatly appreciate if you could fill that out and turn it back in. We want your voice to be heard and we value your feedback.     Thank you for choosing [facility] for your healthcare needs.

## 2024-04-16 ENCOUNTER — OFFICE VISIT (OUTPATIENT)
Dept: FAMILY MEDICINE CLINIC | Facility: CLINIC | Age: 74
End: 2024-04-16
Payer: MEDICARE

## 2024-04-16 VITALS
BODY MASS INDEX: 24.37 KG/M2 | RESPIRATION RATE: 16 BRPM | DIASTOLIC BLOOD PRESSURE: 52 MMHG | HEIGHT: 73 IN | HEART RATE: 83 BPM | TEMPERATURE: 98.1 F | SYSTOLIC BLOOD PRESSURE: 120 MMHG | WEIGHT: 183.9 LBS | OXYGEN SATURATION: 96 %

## 2024-04-16 DIAGNOSIS — E78.00 HYPERCHOLESTEROLEMIA: ICD-10-CM

## 2024-04-16 DIAGNOSIS — I10 PRIMARY HYPERTENSION: Primary | ICD-10-CM

## 2024-04-16 DIAGNOSIS — I48.19 PERSISTENT ATRIAL FIBRILLATION: ICD-10-CM

## 2024-04-16 DIAGNOSIS — J18.9 PNEUMONIA OF LEFT LOWER LOBE DUE TO INFECTIOUS ORGANISM: ICD-10-CM

## 2024-04-16 PROCEDURE — 3074F SYST BP LT 130 MM HG: CPT | Performed by: INTERNAL MEDICINE

## 2024-04-16 PROCEDURE — 1160F RVW MEDS BY RX/DR IN RCRD: CPT | Performed by: INTERNAL MEDICINE

## 2024-04-16 PROCEDURE — 99214 OFFICE O/P EST MOD 30 MIN: CPT | Performed by: INTERNAL MEDICINE

## 2024-04-16 PROCEDURE — 3078F DIAST BP <80 MM HG: CPT | Performed by: INTERNAL MEDICINE

## 2024-04-16 PROCEDURE — 1159F MED LIST DOCD IN RCRD: CPT | Performed by: INTERNAL MEDICINE

## 2024-04-16 RX ORDER — BENZONATATE 200 MG/1
200 CAPSULE ORAL 3 TIMES DAILY PRN
Qty: 30 CAPSULE | Refills: 0 | Status: SHIPPED | OUTPATIENT
Start: 2024-04-16

## 2024-04-16 RX ORDER — ROSUVASTATIN CALCIUM 5 MG/1
5 TABLET, COATED ORAL DAILY
Qty: 90 TABLET | Refills: 3 | Status: SHIPPED | OUTPATIENT
Start: 2024-04-16

## 2024-04-16 NOTE — ASSESSMENT & PLAN NOTE
Lipid abnormalities are stable    Plan:  Continue same medication/s without change.  Crestor 5 mg daily  Counseled patient on lifestyle modifications to help control hyperlipidemia.   Cholesterol lowering dietary information shared with patient.  Advised patient to exercise for 150 minutes weekly. (30 minute brisk walk, 5 days a week for example)    Patient Treatment Goals:   LDL goal is less than 70    Followup  in 4 months .

## 2024-04-16 NOTE — PROGRESS NOTES
Chief Complaint   Patient presents with    Hypertension    Hyperlipidemia    Establish Care     Pt was seen in the hospital in [place], pt was having cough last visit, cough still hasn't cleared up yet wants refill of tessalon pearls, patient was diagnosed with Afib given till the 8th to see if he has to be shocked or not, losing weight , everything taste bitter, had covid test, negative. Patient was also dx with Community Acquired Pneumonia     Pneumonia   History of Present Illness:  Patient is a 74-year-old male with history of HTN, HLD, recent onset A-fib and recently treated community-acquired pneumonia who presents to the clinic today to establish care with a new PCP.  He reports symptoms are improving, still having persistent cough but not as much as before. Requesting for refill of Tessalon Perles.  Currently on Augmentin to complete a 10-day course.  Started on metoprolol and Eliquis for A-fib, follows with cardiology.  Reports occasional SOBOE but no chest pain, fever or chills.    Outpatient Medications Prior to Visit   Medication Sig Dispense Refill    Eliquis 5 MG tablet tablet Take 1 tablet by mouth Every 12 (Twelve) Hours. 180 tablet 3    ibuprofen (ADVIL,MOTRIN) 200 MG tablet Take 2 tablets by mouth Every 6 (Six) Hours As Needed for Mild Pain.      lisinopril-hydrochlorothiazide (PRINZIDE,ZESTORETIC) 20-12.5 MG per tablet Take 2 tablets by mouth Daily. 180 tablet 3    metoprolol tartrate (LOPRESSOR) 50 MG tablet Take 1 tablet by mouth 2 (Two) Times a Day. 180 tablet 3    traZODone (DESYREL) 50 MG tablet TAKE ONE TABLET BY MOUTH ONCE NIGHTLY 90 tablet 1    benzonatate (TESSALON) 200 MG capsule Take 1 capsule by mouth 3 (Three) Times a Day As Needed for Cough. 21 capsule 0    rosuvastatin (CRESTOR) 5 MG tablet Take 1 tablet by mouth Daily. 90 tablet 3    amoxicillin-clavulanate (AUGMENTIN) 875-125 MG per tablet Take 1 tablet by mouth 2 (Two) Times a Day for 10 days. (Patient not taking:  "Reported on 4/16/2024) 20 tablet 0    doxycycline (ADOXA) 100 MG tablet       azithromycin (Zithromax Z-Michael) 250 MG tablet Take 2 tablets the first day, then 1 tablet daily for 4 days. (Patient not taking: Reported on 4/16/2024) 6 tablet 0    doxycycline (MONODOX) 100 MG capsule Take 1 capsule by mouth 2 (Two) Times a Day for 10 days. (Patient not taking: Reported on 4/16/2024) 20 capsule 0    methylPREDNISolone (MEDROL) 4 MG tablet  (Patient not taking: Reported on 4/16/2024)       No facility-administered medications prior to visit.      No Known Allergies  Past Surgical History:   Procedure Laterality Date    APPENDECTOMY      BASAL CELL CARCINOMA EXCISION      COLONOSCOPY      Date unknown    EYE SURGERY      HERNIA REPAIR      KIDNEY STONE SURGERY      KNEE SURGERY Right     SHOULDER SURGERY Right     WISDOM TOOTH EXTRACTION       family history includes Arthritis in his father and mother; Breast cancer in his sister; Clotting disorder in his father; Heart attack in his father; Heart disease in his father; Hyperlipidemia in his father; Hypertension in his brother and father.   reports that he has never smoked. He has never been exposed to tobacco smoke. He has never used smokeless tobacco. He reports current alcohol use of about 7.0 standard drinks of alcohol per week. He reports that he does not use drugs.     /52 (BP Location: Left arm, Patient Position: Sitting, Cuff Size: Adult)   Pulse 83   Temp 98.1 °F (36.7 °C) (Oral)   Resp 16   Ht 185.4 cm (72.99\")   Wt 83.4 kg (183 lb 14.4 oz)   SpO2 96%   BMI 24.27 kg/m²   Physical Exam  Constitutional:       Appearance: Normal appearance.   HENT:      Head: Normocephalic and atraumatic.   Cardiovascular:      Heart sounds: Normal heart sounds.   Pulmonary:      Breath sounds: Normal breath sounds.   Neurological:      Mental Status: He is alert.          The following data was reviewed by: Charo Parmar MD on 04/16/2024:  Common labs          " 8/10/2023    09:37 8/30/2023    13:07   Common Labs   Glucose 116  126    BUN 14  19    Creatinine 1.01  1.54    Sodium 140  138    Potassium 3.9  3.9    Chloride 102  99    Calcium 9.2  9.5    Total Protein 5.9     Albumin 4.0  3.6    Total Bilirubin 0.4  <0.2    Alkaline Phosphatase 56  74    AST (SGOT) 20  14    ALT (SGPT) 15  12    WBC  4.73    Hemoglobin  12.6    Hematocrit  35.6    Platelets  394    Total Cholesterol 167     Triglycerides 55     HDL Cholesterol 86     LDL Cholesterol  70       Data reviewed : Radiologic studies Chest x-ray [04/08/2024]: revealed mild patchy infiltrates in the left lower lobe consistent with pneumonia or atelectasis.      Diagnoses and all orders for this visit:    1. Primary hypertension (Primary)  Assessment & Plan:  Hypertension is stable and controlled  Continue current treatment regimen.  Dietary sodium restriction.  Weight loss.  Regular aerobic exercise.  Blood pressure will be reassessed  in 4 months .      2. Hypercholesterolemia  Assessment & Plan:   Lipid abnormalities are stable    Plan:  Continue same medication/s without change.  Crestor 5 mg daily  Counseled patient on lifestyle modifications to help control hyperlipidemia.   Cholesterol lowering dietary information shared with patient.  Advised patient to exercise for 150 minutes weekly. (30 minute brisk walk, 5 days a week for example)    Patient Treatment Goals:   LDL goal is less than 70    Followup  in 4 months .    Orders:  -     rosuvastatin (CRESTOR) 5 MG tablet; Take 1 tablet by mouth Daily.  Dispense: 90 tablet; Refill: 3    3. Pneumonia of left lower lobe due to infectious organism  Comments:  Clinically improving,  Tessalon Perles refilled  To complete antibiotic therapy  Orders:  -     benzonatate (TESSALON) 200 MG capsule; Take 1 capsule by mouth 3 (Three) Times a Day As Needed for Cough.  Dispense: 30 capsule; Refill: 0    4. Persistent atrial fibrillation  Assessment & Plan:  Rate controlled on  medication  Continue on metoprolol and anticoagulation with Eliquis  Follow-up with cardiology for possible cardioversion               Return in about 4 months (around 8/16/2024) for Recheck with labs.

## 2024-04-16 NOTE — ASSESSMENT & PLAN NOTE
Hypertension is stable and controlled  Continue current treatment regimen.  Dietary sodium restriction.  Weight loss.  Regular aerobic exercise.  Blood pressure will be reassessed  in 4 months .

## 2024-04-16 NOTE — ASSESSMENT & PLAN NOTE
Rate controlled on medication  Continue on metoprolol and anticoagulation with Eliquis  Follow-up with cardiology for possible cardioversion

## 2024-05-08 ENCOUNTER — OFFICE VISIT (OUTPATIENT)
Dept: CARDIOLOGY | Facility: CLINIC | Age: 74
End: 2024-05-08
Payer: MEDICARE

## 2024-05-08 VITALS
WEIGHT: 186 LBS | HEART RATE: 93 BPM | BODY MASS INDEX: 24.65 KG/M2 | OXYGEN SATURATION: 96 % | DIASTOLIC BLOOD PRESSURE: 80 MMHG | HEIGHT: 73 IN | SYSTOLIC BLOOD PRESSURE: 125 MMHG

## 2024-05-08 DIAGNOSIS — I48.19 PERSISTENT ATRIAL FIBRILLATION: Primary | ICD-10-CM

## 2024-05-08 DIAGNOSIS — I10 PRIMARY HYPERTENSION: ICD-10-CM

## 2024-05-08 DIAGNOSIS — I35.1 NONRHEUMATIC AORTIC VALVE INSUFFICIENCY: ICD-10-CM

## 2024-05-08 PROCEDURE — 3074F SYST BP LT 130 MM HG: CPT | Performed by: NURSE PRACTITIONER

## 2024-05-08 PROCEDURE — 99214 OFFICE O/P EST MOD 30 MIN: CPT | Performed by: NURSE PRACTITIONER

## 2024-05-08 PROCEDURE — 3079F DIAST BP 80-89 MM HG: CPT | Performed by: NURSE PRACTITIONER

## 2024-05-08 PROCEDURE — 93000 ELECTROCARDIOGRAM COMPLETE: CPT | Performed by: NURSE PRACTITIONER

## 2024-05-08 NOTE — H&P (VIEW-ONLY)
"    CARDIOLOGY        Patient Name: Roney Kuhn  :1950  Age: 74 y.o.  Primary Cardiologist: Tammy Mi MD  Encounter Provider:  KARI Humphries    Date of Service: 24    CHIEF COMPLAINT / REASON FOR OFFICE VISIT     Follow-Up and Atrial Fibrillation      HISTORY OF PRESENT ILLNESS       HPI  Roney Kuhn is a 74 y.o. male who presents today for 1 month reevaluation.     Pt has a  history significant for hypertension, hyperlipidemia, atrial fibrillation     Patient established care with Dr. Mi in 2024.  While patient was in [facility] on vacation he was diagnosed with pneumonia as well as atrial fibrillation.  He was started on metoprolol and apixaban.  When he was evaluated his heart rate was controlled.  Patient is here today for follow-up to determine if he is still in atrial fibrillation, consider cardioversion.  Echocardiogram results were requested.    Patient reports that since last assessment, that he has experienced generalized fatigue.  He has been compliant with all of his medications.  He has not missed any doses of his medications.  He denies chest pain, shortness of breath, palpitations.     The following portions of the patient's history were reviewed and updated as appropriate: allergies, current medications, past family history, past medical history, past social history, past surgical history and problem list.      VITAL SIGNS     Visit Vitals  /80 (BP Location: Left arm, Patient Position: Sitting)   Pulse 93   Ht 185.4 cm (72.99\")   Wt 84.4 kg (186 lb)   SpO2 96%   BMI 24.55 kg/m²         Wt Readings from Last 3 Encounters:   24 84.4 kg (186 lb)   24 83.4 kg (183 lb 14.4 oz)   04/10/24 85.5 kg (188 lb 6.4 oz)     Body mass index is 24.55 kg/m².      REVIEW OF SYSTEMS     Review of Systems   Constitutional: Negative for chills, fever, weight gain and weight loss.   Cardiovascular:  Negative for leg swelling.   Respiratory:  Negative " for cough, snoring and wheezing.    Hematologic/Lymphatic: Negative for bleeding problem. Does not bruise/bleed easily.   Skin:  Negative for color change.   Musculoskeletal:  Negative for falls, joint pain and myalgias.   Gastrointestinal:  Negative for melena.   Genitourinary:  Negative for hematuria.   Neurological:  Negative for excessive daytime sleepiness.   Psychiatric/Behavioral:  Negative for depression. The patient is not nervous/anxious.            PHYSICAL EXAMINATION     Constitutional:       Appearance: Normal appearance. Well-developed.   Eyes:      Conjunctiva/sclera: Conjunctivae normal.   Neck:      Vascular: No carotid bruit.   Pulmonary:      Effort: Pulmonary effort is normal.      Breath sounds: Normal breath sounds.   Cardiovascular:      Normal rate. Irregularly irregular rhythm. Normal S1. Normal S2.       Murmurs: There is no murmur.      No gallop.  No click. No rub.   Edema:     Peripheral edema absent.   Musculoskeletal: Normal range of motion. Skin:     General: Skin is warm and dry.   Neurological:      Mental Status: Alert and oriented to person, place, and time.      GCS: GCS eye subscore is 4. GCS verbal subscore is 5. GCS motor subscore is 6.   Psychiatric:         Speech: Speech normal.         Behavior: Behavior normal.         Thought Content: Thought content normal.         Judgment: Judgment normal.           REVIEWED DATA       ECG 12 Lead    Date/Time: 5/8/2024 1:30 PM  Performed by: Leah Saucedo APRN    Authorized by: Leah Saucedo APRN  Comparison: compared with previous ECG   Similar to previous ECG  Rhythm: atrial fibrillation  Rate: normal  BPM: 62  Conduction: conduction normal  ST Segments: ST segments normal  T Waves: T waves normal  QRS axis: normal    Clinical impression: abnormal EKG              Lipid Panel          8/10/2023    09:37   Lipid Panel   Total Cholesterol 167    Triglycerides 55    HDL Cholesterol 86    VLDL Cholesterol 11    LDL  "Cholesterol  70    LDL/HDL Ratio 0.81        Lab Results   Component Value Date     08/30/2023     08/10/2023    K 3.9 08/30/2023    K 3.9 08/10/2023    CL 99 08/30/2023     08/10/2023    CO2 26.0 08/30/2023    CO2 28.8 08/10/2023    BUN 19 08/30/2023    BUN 14 08/10/2023    CREATININE 1.54 (H) 08/30/2023    CREATININE 1.01 08/10/2023    EGFRIFNONA 86 01/04/2022    EGFRIFNONA 82 06/11/2021    EGFRIFAFRI 99 01/04/2022    EGFRIFAFRI 99 06/11/2021    GLUCOSE 126 (H) 08/30/2023    GLUCOSE 116 (H) 08/10/2023    CALCIUM 9.5 08/30/2023    CALCIUM 9.2 08/10/2023    PROTENTOTREF 5.9 (L) 08/10/2023    PROTENTOTREF 6.1 01/31/2023    ALBUMIN 3.6 08/30/2023    ALBUMIN 4.0 08/10/2023    BILITOT <0.2 08/30/2023    BILITOT 0.4 08/10/2023    AST 14 08/30/2023    AST 20 08/10/2023    ALT 12 08/30/2023    ALT 15 08/10/2023     Lab Results   Component Value Date    WBC 4.73 08/30/2023    WBC 3.89 01/31/2023    HGB 12.6 (L) 08/30/2023    HGB 14.2 01/31/2023    HCT 35.6 (L) 08/30/2023    HCT 41.4 01/31/2023    MCV 89.4 08/30/2023    MCV 90.0 01/31/2023     08/30/2023     01/31/2023     No results found for: \"PROBNP\", \"BNP\"  Lab Results   Component Value Date    CKTOTAL 36 08/30/2023     Lab Results   Component Value Date    TSH 2.210 01/31/2023    TSH 2.780 06/11/2021             ASSESSMENT & PLAN     Diagnoses and all orders for this visit:    1. Persistent atrial fibrillation (Primary)  Overview:  CHADS-VASc Risk Assessment               3 Total Score    1 Hypertension    2 Age >/= 75    Criteria that do not apply:    CHF    DM    PRIOR STROKE/TIA/THROMBO    Vascular Disease    Age 65-74    Sex: Female              Assessment & Plan:  Patient reports generalized fatigue  Heart rate controlled on ECG, patient remains in atrial fibrillation  I have requested echocardiogram to see if patient has any valvular disease and also to assess the size of the atria  Once I review this report, will discuss further " with Dr. Mi in regards to planning for a synchronized cardioversion  Patient will continue metoprolol tartrate at this time  Continue apixaban, denies bleeding complications and he has not missed any doses.      2. Primary hypertension  Assessment & Plan:  BP controlled in clinic at 125/80  Continue metoprolol tartrate      Other orders  -     ECG 12 Lead        No follow-ups on file.    Future Appointments         Provider Department Center    8/14/2024 9:00 AM [facility]    8/20/2024 9:00 AM Charo Parmar MD [facility]          Addendum 5/9/2020 2411: 37    We have received echocardiogram results from [facility] dated 4/2/2024.  LVEF 45-50%.  Moderate aortic valve regurgitation.  Mild mitral valve regurgitation.  No evidence of aortic valve stenosis.  Aortic root 4.3 cm.  Right atrium is mildly dilated.  Left atrium is mildly dilated.    I have discussed these results with Dr. Mi, she recommends proceeding with cardioversion with plans for patient to hold beta-blocker the night before and the morning of procedure.  I have attempted to notify patient by phone of these recommendations and have left a message for him to call the office and asked to speak with triage to review these recommendations.    MEDICATIONS         Discharge Medications            Accurate as of May 8, 2024  2:13 PM. If you have any questions, ask your nurse or doctor.                Continue These Medications        Instructions Start Date   Eliquis 5 MG tablet tablet  Generic drug: apixaban   5 mg, Oral, Every 12 Hours Scheduled      lisinopril-hydrochlorothiazide 20-12.5 MG per tablet  Commonly known as: PRINZIDE,ZESTORETIC   2 tablets, Oral, Daily      metoprolol tartrate 50 MG tablet  Commonly known as: LOPRESSOR   50 mg, Oral, 2 Times Daily      rosuvastatin 5 MG tablet  Commonly known as: CRESTOR   5 mg, Oral, Daily       TAMSULOSIN HCL PO       traZODone 50 MG tablet  Commonly known as: DESYREL   TAKE ONE TABLET BY MOUTH ONCE NIGHTLY             Stop These Medications      benzonatate 200 MG capsule  Commonly known as: TESSALON  Stopped by: KARI Humphries     doxycycline 100 MG tablet  Commonly known as: ADOXA  Stopped by: KARI Humphries     ibuprofen 200 MG tablet  Commonly known as: ADVIL,MOTRIN  Stopped by: KARI Humphries                  **Dragon Disclaimer:   Much of this encounter note is an electronic transcription/translation of spoken language to printed text. The electronic translation of spoken language may permit erroneous, or at times, nonsensical words or phrases to be inadvertently transcribed. Although I have reviewed the note for such errors, some may still exist.

## 2024-05-09 ENCOUNTER — TELEPHONE (OUTPATIENT)
Dept: CARDIOLOGY | Facility: CLINIC | Age: 74
End: 2024-05-09
Payer: MEDICARE

## 2024-05-09 PROBLEM — I35.1 NONRHEUMATIC AORTIC VALVE INSUFFICIENCY: Status: ACTIVE | Noted: 2024-05-09

## 2024-05-15 ENCOUNTER — TRANSCRIBE ORDERS (OUTPATIENT)
Dept: CARDIOLOGY | Facility: CLINIC | Age: 74
End: 2024-05-15
Payer: MEDICARE

## 2024-05-15 ENCOUNTER — LAB (OUTPATIENT)
Facility: HOSPITAL | Age: 74
End: 2024-05-15
Payer: MEDICARE

## 2024-05-15 DIAGNOSIS — Z13.6 SCREENING FOR ISCHEMIC HEART DISEASE: ICD-10-CM

## 2024-05-15 DIAGNOSIS — Z13.6 SCREENING FOR ISCHEMIC HEART DISEASE: Primary | ICD-10-CM

## 2024-05-15 LAB
ANION GAP SERPL CALCULATED.3IONS-SCNC: 12 MMOL/L (ref 5–15)
BUN SERPL-MCNC: 18 MG/DL (ref 8–23)
BUN/CREAT SERPL: 20 (ref 7–25)
CALCIUM SPEC-SCNC: 9.1 MG/DL (ref 8.6–10.5)
CHLORIDE SERPL-SCNC: 105 MMOL/L (ref 98–107)
CO2 SERPL-SCNC: 22 MMOL/L (ref 22–29)
CREAT SERPL-MCNC: 0.9 MG/DL (ref 0.76–1.27)
EGFRCR SERPLBLD CKD-EPI 2021: 88.5 ML/MIN/1.73
GLUCOSE SERPL-MCNC: 96 MG/DL (ref 65–99)
POTASSIUM SERPL-SCNC: 4.5 MMOL/L (ref 3.5–5.2)
SODIUM SERPL-SCNC: 139 MMOL/L (ref 136–145)

## 2024-05-15 PROCEDURE — 80048 BASIC METABOLIC PNL TOTAL CA: CPT

## 2024-05-15 PROCEDURE — 36415 COLL VENOUS BLD VENIPUNCTURE: CPT

## 2024-05-22 ENCOUNTER — HOSPITAL ENCOUNTER (OUTPATIENT)
Dept: CARDIOLOGY | Facility: HOSPITAL | Age: 74
Discharge: HOME | End: 2024-05-22
Payer: MEDICARE

## 2024-05-22 VITALS
HEART RATE: 62 BPM | RESPIRATION RATE: 18 BRPM | SYSTOLIC BLOOD PRESSURE: 127 MMHG | OXYGEN SATURATION: 97 % | DIASTOLIC BLOOD PRESSURE: 58 MMHG

## 2024-05-22 DIAGNOSIS — I48.19 PERSISTENT ATRIAL FIBRILLATION: ICD-10-CM

## 2024-05-22 LAB
QT INTERVAL: 432 MS
QTC INTERVAL: 428 MS

## 2024-05-22 PROCEDURE — 92960 CARDIOVERSION ELECTRIC EXT: CPT

## 2024-05-22 PROCEDURE — 93005 ELECTROCARDIOGRAM TRACING: CPT | Performed by: NURSE PRACTITIONER

## 2024-05-22 RX ORDER — SODIUM CHLORIDE 9 MG/ML
INJECTION, SOLUTION INTRAVENOUS
Status: COMPLETED | OUTPATIENT
Start: 2024-05-22 | End: 2024-05-22

## 2024-05-22 RX ADMIN — SODIUM CHLORIDE 50 ML/HR: 9 INJECTION, SOLUTION INTRAVENOUS at 08:10

## 2024-05-22 RX ADMIN — METHOHEXITAL SODIUM 30 MG: 500 INJECTION, POWDER, LYOPHILIZED, FOR SOLUTION INTRAMUSCULAR; INTRAVENOUS; RECTAL at 08:21

## 2024-08-01 ENCOUNTER — TELEPHONE (OUTPATIENT)
Dept: CARDIOLOGY | Facility: CLINIC | Age: 74
End: 2024-08-01
Payer: MEDICARE

## 2024-08-01 NOTE — TELEPHONE ENCOUNTER
Queenie from Dr. Lizz Wiggins's office called regarding Roney Kuhn.  Queenie reports she left a voicemail last week but had not received a response.     Queenie reports that patient was previously taking metoprolol tartrate 50 mg BID.  On this dosage, patient was having low HR ranging from the 50's to 60's and was reporting lightheadedness and fatigue.  As a result, Dr. Wiggins made a medication change and reduced metoprolol tartrate to 25 mg BID.  Queenie stated that since med change, patient's HR is in the low 60's and he is feeling better.  She wanted to make provider aware as patient will be seen by Liss tomorrow, 8/2.    Updated medication list to reflect changes.    Please let me know if there is anything else you would like me to do for this patient.    Thank you,  Angeles SEXTON RN  [place] Nurse [facility] [place]   13:39 EDT

## 2024-08-02 ENCOUNTER — TELEPHONE (OUTPATIENT)
Dept: CARDIOLOGY | Facility: CLINIC | Age: 74
End: 2024-08-02

## 2024-08-02 ENCOUNTER — HOSPITAL ENCOUNTER (OUTPATIENT)
Dept: CARDIOLOGY | Facility: HOSPITAL | Age: 74
Discharge: HOME | End: 2024-08-02
Payer: MEDICARE

## 2024-08-02 ENCOUNTER — OFFICE VISIT (OUTPATIENT)
Dept: CARDIOLOGY | Facility: CLINIC | Age: 74
End: 2024-08-02
Payer: MEDICARE

## 2024-08-02 VITALS
OXYGEN SATURATION: 98 % | DIASTOLIC BLOOD PRESSURE: 66 MMHG | HEIGHT: 73 IN | WEIGHT: 189 LBS | HEART RATE: 57 BPM | SYSTOLIC BLOOD PRESSURE: 150 MMHG | BODY MASS INDEX: 25.05 KG/M2

## 2024-08-02 VITALS
HEIGHT: 73 IN | SYSTOLIC BLOOD PRESSURE: 180 MMHG | DIASTOLIC BLOOD PRESSURE: 80 MMHG | BODY MASS INDEX: 24.65 KG/M2 | HEART RATE: 56 BPM | WEIGHT: 186 LBS

## 2024-08-02 DIAGNOSIS — I35.1 NONRHEUMATIC AORTIC VALVE INSUFFICIENCY: Primary | ICD-10-CM

## 2024-08-02 DIAGNOSIS — I77.810 AORTIC ROOT DILATION: ICD-10-CM

## 2024-08-02 DIAGNOSIS — I10 PRIMARY HYPERTENSION: ICD-10-CM

## 2024-08-02 DIAGNOSIS — Z92.89 HISTORY OF CARDIOVERSION: ICD-10-CM

## 2024-08-02 DIAGNOSIS — I35.1 NONRHEUMATIC AORTIC VALVE INSUFFICIENCY: ICD-10-CM

## 2024-08-02 DIAGNOSIS — I48.19 PERSISTENT ATRIAL FIBRILLATION: ICD-10-CM

## 2024-08-02 LAB
AORTIC ARCH: 2.5 CM
ASCENDING AORTA: 4 CM
BH CV ECHO MEAS - ACS: 2.38 CM
BH CV ECHO MEAS - AI P1/2T: 364.7 MSEC
BH CV ECHO MEAS - AO MAX PG: 6.1 MMHG
BH CV ECHO MEAS - AO MEAN PG: 3 MMHG
BH CV ECHO MEAS - AO ROOT DIAM: 3.7 CM
BH CV ECHO MEAS - AO V2 MAX: 123 CM/SEC
BH CV ECHO MEAS - AO V2 VTI: 32.5 CM
BH CV ECHO MEAS - AVA(I,D): 3.6 CM2
BH CV ECHO MEAS - EDV(CUBED): 227 ML
BH CV ECHO MEAS - EDV(MOD-SP2): 157 ML
BH CV ECHO MEAS - EDV(MOD-SP4): 154 ML
BH CV ECHO MEAS - EF(MOD-BP): 53.6 %
BH CV ECHO MEAS - EF(MOD-SP2): 51 %
BH CV ECHO MEAS - EF(MOD-SP4): 55.8 %
BH CV ECHO MEAS - ESV(CUBED): 79.9 ML
BH CV ECHO MEAS - ESV(MOD-SP2): 77 ML
BH CV ECHO MEAS - ESV(MOD-SP4): 68 ML
BH CV ECHO MEAS - FS: 29.4 %
BH CV ECHO MEAS - IVS/LVPW: 1 CM
BH CV ECHO MEAS - IVSD: 1.1 CM
BH CV ECHO MEAS - LAT PEAK E' VEL: 10.9 CM/SEC
BH CV ECHO MEAS - LV DIASTOLIC VOL/BSA (35-75): 73.8 CM2
BH CV ECHO MEAS - LV MASS(C)D: 287.5 GRAMS
BH CV ECHO MEAS - LV MAX PG: 3.5 MMHG
BH CV ECHO MEAS - LV MEAN PG: 2 MMHG
BH CV ECHO MEAS - LV SYSTOLIC VOL/BSA (12-30): 32.6 CM2
BH CV ECHO MEAS - LV V1 MAX: 93 CM/SEC
BH CV ECHO MEAS - LV V1 VTI: 23.8 CM
BH CV ECHO MEAS - LVIDD: 6.1 CM
BH CV ECHO MEAS - LVIDS: 4.3 CM
BH CV ECHO MEAS - LVOT AREA: 4.9 CM2
BH CV ECHO MEAS - LVOT DIAM: 2.5 CM
BH CV ECHO MEAS - LVPWD: 1.1 CM
BH CV ECHO MEAS - MED PEAK E' VEL: 9.1 CM/SEC
BH CV ECHO MEAS - MR MAX PG: 94.7 MMHG
BH CV ECHO MEAS - MR MAX VEL: 486.5 CM/SEC
BH CV ECHO MEAS - MV A DUR: 0.12 SEC
BH CV ECHO MEAS - MV A MAX VEL: 41.6 CM/SEC
BH CV ECHO MEAS - MV DEC SLOPE: 300.5 CM/SEC2
BH CV ECHO MEAS - MV DEC TIME: 0.26 SEC
BH CV ECHO MEAS - MV E MAX VEL: 66.4 CM/SEC
BH CV ECHO MEAS - MV E/A: 1.6
BH CV ECHO MEAS - MV P1/2T: 84.1 MSEC
BH CV ECHO MEAS - MVA(P1/2T): 2.6 CM2
BH CV ECHO MEAS - PA ACC TIME: 0.13 SEC
BH CV ECHO MEAS - PA V2 MAX: 82.8 CM/SEC
BH CV ECHO MEAS - PULM A REVS DUR: 0.15 SEC
BH CV ECHO MEAS - PULM A REVS VEL: 30.4 CM/SEC
BH CV ECHO MEAS - PULM DIAS VEL: 42 CM/SEC
BH CV ECHO MEAS - PULM S/D: 1.22
BH CV ECHO MEAS - PULM SYS VEL: 51.4 CM/SEC
BH CV ECHO MEAS - QP/QS: 0.46
BH CV ECHO MEAS - RV MAX PG: 1.17 MMHG
BH CV ECHO MEAS - RV V1 MAX: 54 CM/SEC
BH CV ECHO MEAS - RV V1 VTI: 12.4 CM
BH CV ECHO MEAS - RVOT DIAM: 2.37 CM
BH CV ECHO MEAS - SUP REN AO DIAM: 2.5 CM
BH CV ECHO MEAS - SV(LVOT): 117 ML
BH CV ECHO MEAS - SV(MOD-SP2): 80 ML
BH CV ECHO MEAS - SV(MOD-SP4): 86 ML
BH CV ECHO MEAS - SV(RVOT): 54.3 ML
BH CV ECHO MEAS - SVI(LVOT): 56.1 ML/M2
BH CV ECHO MEAS - SVI(MOD-SP2): 38.3 ML/M2
BH CV ECHO MEAS - SVI(MOD-SP4): 41.2 ML/M2
BH CV ECHO MEAS - TAPSE (>1.6): 2.6 CM
BH CV ECHO MEAS - TR MAX PG: 23.7 MMHG
BH CV ECHO MEAS - TR MAX VEL: 243.6 CM/SEC
BH CV ECHO MEASUREMENTS AVERAGE E/E' RATIO: 6.64
BH CV XLRA - RV BASE: 3.5 CM
BH CV XLRA - RV LENGTH: 7.5 CM
BH CV XLRA - RV MID: 2.27 CM
BH CV XLRA - TDI S': 11.6 CM/SEC
LEFT ATRIUM VOLUME INDEX: 41 ML/M2
SINUS: 3.9 CM
STJ: 3.3 CM

## 2024-08-02 PROCEDURE — 93306 TTE W/DOPPLER COMPLETE: CPT

## 2024-08-02 RX ORDER — LISINOPRIL AND HYDROCHLOROTHIAZIDE 20; 12.5 MG/1; MG/1
1 TABLET ORAL DAILY
Start: 2024-08-02

## 2024-08-02 NOTE — PROGRESS NOTES
"Date of Office Visit: 24  Encounter Provider: KARI Lockhart  Place of Service: [facility] CARDIOLOGY  Patient Name: Roney Kuhn  :1950    Chief Complaint   Patient presents with    Persistent atrial fibrillation    Follow-up   :     HPI: Roney Kuhn is a 74 y.o. male  with atrial fibrillation, aortic valve insufficiency, hypertension, hyperlipidemia, cardiomyopathy, aortic root dilation, BPH.       He is followed by Dr. Mi.  I will visit with him for the first time I have reviewed his medical record.    In April he was on vacation in [facility] and had an aggressive cough.  He went to urgent care and ultimately was admitted to the nearest hospital.  He was treated for pneumonia and told he had A-fib.  He had an echocardiogram which showed ejection fraction of 45-50% and dilated left ventricle, aortic root measuring 4.3 cm, mild mitral valve regurgitation, mild to moderate tricuspid valve regurgitation, mildly dilated left atrium, mildly elevated pulmonary pressure 35 mmHg,  In May 2024 patient had excess for cardioversion.    He presents today for reassessment.  His PCP lowered her metoprolol from 25 mg twice daily to 12.5 mg twice daily because of his low pulse readings at home.  He started at the gym on  and felt good with restarting routine exercise.  He has no chest pain or shortness of breath or edema or dizziness or palpitation.  He reports some fatigue related to beta-blocker treatment but overall is feeling good.  No blood in the urine or stool.    No Known Allergies        Family and social history reviewed.     Review of Systems   Constitutional: Positive for malaise/fatigue.     All other systems were reviewed and are negative          Objective:     Vitals:    24 0840   BP: 150/66   BP Location: Left arm   Patient Position: Sitting   Pulse: 57   SpO2: 98%   Weight: 85.7 kg (189 lb)   Height: 185.4 cm (73\")     Body mass " index is 24.94 kg/m².    PHYSICAL EXAM:  Pulmonary:      Effort: Pulmonary effort is normal.      Breath sounds: Normal breath sounds.   Cardiovascular:      Normal rate. Regular rhythm.         Procedures      Current Outpatient Medications   Medication Sig Dispense Refill    Eliquis 5 MG tablet tablet Take 1 tablet by mouth Every 12 (Twelve) Hours. 180 tablet 3    lisinopril-hydrochlorothiazide (PRINZIDE,ZESTORETIC) 20-12.5 MG per tablet Take 1 tablet by mouth Daily.      metoprolol tartrate (LOPRESSOR) 25 MG tablet Take 0.5 tablets by mouth 2 (Two) Times a Day.      rosuvastatin (CRESTOR) 5 MG tablet Take 1 tablet by mouth Daily. 90 tablet 3    TAMSULOSIN HCL PO       traZODone (DESYREL) 50 MG tablet TAKE ONE TABLET BY MOUTH ONCE NIGHTLY 90 tablet 1     No current facility-administered medications for this visit.     Assessment:       Diagnosis Plan   1. Nonrheumatic aortic valve insufficiency        2. Primary hypertension        3. History of cardioversion        4. Persistent atrial fibrillation             No orders of the defined types were placed in this encounter.        Plan:   1.  74-year-old gentleman with atrial fibrillation status post cardioversion May 2024.  Appears to be maintaining normal sinus rhythm.  He will continue metoprolol tartrate 12.5 mg twice daily and Eliquis 5 mg twice daily  2.  Hyperlipidemia rosuvastatin 5 mg  3.  Hypertension-blood pressure little elevated today but normally better reportedly  4.  BPH on tamsulosin  5.  Aortic valve insufficiency-repeat echocardiogram pending final review.  We will call patient with final results.  6.  Tricuspid valve insufficiency-as listed above  7.  Aortic Root dilation at 4.3 cm on echo in 04/2024.-As listed above  Follow-up in 6 months continue the same regimen      It has been a pleasure to participate in this patient's care.      Thank you,  KARI Lockhart      **I used Dragon to dictate this note:**

## 2024-08-02 NOTE — TELEPHONE ENCOUNTER
I left a minimally detailed message with patient expressing concern of progressive aortic valve regurgitation and wanting to schedule transesophageal echo given his fatigue complaint.  If he does not call back today, I plan to call him again personally on Monday  I recommend MICHELLE in approx.  4-6 weeks with Dr. Mi or her next available for MICHELLE.       Left ventricular systolic function is low normal. Calculated left ventricular EF = 53.6%    The left ventricular cavity is mildly dilated.    Left ventricular wall thickness is consistent with mild concentric hypertrophy.    Aortic valve appears trileaflet from available images but there is very eccentric moderate to severe aortic valve regurgitation present, which may be underestimated due to eccentricity of the jet.  The mechanism of AR is unclear, consider MICHELLE for further evaluation if clinically indicated.    Estimated right ventricular systolic pressure from tricuspid regurgitation is normal (<35 mmHg).    Mild dilation of the ascending aorta is present (4.0 cm).    Moderate biatrial enlargement, grossly normal IVC size.

## 2024-08-05 NOTE — TELEPHONE ENCOUNTER
I called patient again and left a voicemail.  I have asked him to return my call so we can discuss his echo results.  I would be happy to speak with him personally if he calls back.

## 2024-08-06 NOTE — TELEPHONE ENCOUNTER
I spoke with patient and discussed moderate to severe aortic valve regurgitation.  Also discussed further imaging with the transesophageal echocardiogram.  I recommend that is done around 4 to 6 weeks with Dr. Mi.  Patient was agreeable to proceed.      Shara- He wanted a copy of the echo report (printed) and my notes from today shared with his PCP.  Please fax to Dr. Wiggins    Scheduling-please contact patient to set up transesophageal echocardiogram with Dr. Mi between 4 to 6 weeks from now.

## 2024-08-06 NOTE — TELEPHONE ENCOUNTER
Patient has returned your call.  He will have his phone on him all day today.    Phone [phone]./ ANTHONY

## 2024-08-06 NOTE — TELEPHONE ENCOUNTER
Faxed requested records to Dr. Lizz Wiggins.  Fax# [phone].  Faxed confirmation received./ ANTHONY

## 2024-08-07 ENCOUNTER — TRANSCRIBE ORDERS (OUTPATIENT)
Dept: CARDIOLOGY | Facility: CLINIC | Age: 74
End: 2024-08-07
Payer: MEDICARE

## 2024-08-07 DIAGNOSIS — Z13.6 SCREENING FOR ISCHEMIC HEART DISEASE: ICD-10-CM

## 2024-08-07 DIAGNOSIS — Z01.810 PRE-OPERATIVE CARDIOVASCULAR EXAMINATION: Primary | ICD-10-CM

## 2024-08-28 ENCOUNTER — LAB (OUTPATIENT)
Facility: HOSPITAL | Age: 74
End: 2024-08-28
Payer: MEDICARE

## 2024-08-28 DIAGNOSIS — Z01.810 PRE-OPERATIVE CARDIOVASCULAR EXAMINATION: ICD-10-CM

## 2024-08-28 DIAGNOSIS — Z13.6 SCREENING FOR ISCHEMIC HEART DISEASE: ICD-10-CM

## 2024-08-28 LAB
ANION GAP SERPL CALCULATED.3IONS-SCNC: 8.2 MMOL/L (ref 5–15)
BASOPHILS # BLD AUTO: 0.02 10*3/MM3 (ref 0–0.2)
BASOPHILS NFR BLD AUTO: 0.5 % (ref 0–1.5)
BUN SERPL-MCNC: 14 MG/DL (ref 8–23)
BUN/CREAT SERPL: 14 (ref 7–25)
CALCIUM SPEC-SCNC: 9.6 MG/DL (ref 8.6–10.5)
CHLORIDE SERPL-SCNC: 104 MMOL/L (ref 98–107)
CO2 SERPL-SCNC: 27.8 MMOL/L (ref 22–29)
CREAT SERPL-MCNC: 1 MG/DL (ref 0.76–1.27)
DEPRECATED RDW RBC AUTO: 43 FL (ref 37–54)
EGFRCR SERPLBLD CKD-EPI 2021: 78 ML/MIN/1.73
EOSINOPHIL # BLD AUTO: 0.14 10*3/MM3 (ref 0–0.4)
EOSINOPHIL NFR BLD AUTO: 3.7 % (ref 0.3–6.2)
ERYTHROCYTE [DISTWIDTH] IN BLOOD BY AUTOMATED COUNT: 12.9 % (ref 12.3–15.4)
GLUCOSE SERPL-MCNC: 98 MG/DL (ref 65–99)
HCT VFR BLD AUTO: 44.1 % (ref 37.5–51)
HGB BLD-MCNC: 14.9 G/DL (ref 13–17.7)
IMM GRANULOCYTES # BLD AUTO: 0.01 10*3/MM3 (ref 0–0.05)
IMM GRANULOCYTES NFR BLD AUTO: 0.3 % (ref 0–0.5)
LYMPHOCYTES # BLD AUTO: 0.95 10*3/MM3 (ref 0.7–3.1)
LYMPHOCYTES NFR BLD AUTO: 25.3 % (ref 19.6–45.3)
MCH RBC QN AUTO: 31.1 PG (ref 26.6–33)
MCHC RBC AUTO-ENTMCNC: 33.8 G/DL (ref 31.5–35.7)
MCV RBC AUTO: 92.1 FL (ref 79–97)
MONOCYTES # BLD AUTO: 0.36 10*3/MM3 (ref 0.1–0.9)
MONOCYTES NFR BLD AUTO: 9.6 % (ref 5–12)
NEUTROPHILS NFR BLD AUTO: 2.28 10*3/MM3 (ref 1.7–7)
NEUTROPHILS NFR BLD AUTO: 60.6 % (ref 42.7–76)
NRBC BLD AUTO-RTO: 0 /100 WBC (ref 0–0.2)
PLATELET # BLD AUTO: 208 10*3/MM3 (ref 140–450)
PMV BLD AUTO: 10.3 FL (ref 6–12)
POTASSIUM SERPL-SCNC: 4.3 MMOL/L (ref 3.5–5.2)
RBC # BLD AUTO: 4.79 10*6/MM3 (ref 4.14–5.8)
SODIUM SERPL-SCNC: 140 MMOL/L (ref 136–145)
WBC NRBC COR # BLD AUTO: 3.76 10*3/MM3 (ref 3.4–10.8)

## 2024-08-28 PROCEDURE — 85025 COMPLETE CBC W/AUTO DIFF WBC: CPT

## 2024-08-28 PROCEDURE — 36415 COLL VENOUS BLD VENIPUNCTURE: CPT

## 2024-08-28 PROCEDURE — 80048 BASIC METABOLIC PNL TOTAL CA: CPT

## 2024-09-03 ENCOUNTER — TELEPHONE (OUTPATIENT)
Dept: CARDIOLOGY | Facility: CLINIC | Age: 74
End: 2024-09-03
Payer: MEDICARE

## 2024-09-03 ENCOUNTER — HOSPITAL ENCOUNTER (OUTPATIENT)
Dept: CARDIOLOGY | Facility: HOSPITAL | Age: 74
Discharge: HOME | End: 2024-09-03
Admitting: NURSE PRACTITIONER
Payer: MEDICARE

## 2024-09-03 VITALS
OXYGEN SATURATION: 96 % | SYSTOLIC BLOOD PRESSURE: 138 MMHG | HEART RATE: 53 BPM | RESPIRATION RATE: 16 BRPM | DIASTOLIC BLOOD PRESSURE: 59 MMHG

## 2024-09-03 DIAGNOSIS — I77.810 AORTIC ROOT DILATION: Primary | ICD-10-CM

## 2024-09-03 DIAGNOSIS — I35.1 NONRHEUMATIC AORTIC VALVE INSUFFICIENCY: ICD-10-CM

## 2024-09-03 DIAGNOSIS — I77.810 AORTIC ROOT DILATION: ICD-10-CM

## 2024-09-03 DIAGNOSIS — I35.1 SEVERE AORTIC VALVE REGURGITATION: ICD-10-CM

## 2024-09-03 LAB
BH CV ECHO MEAS - LVOT AREA: 5.8 CM2
BH CV ECHO MEAS - LVOT DIAM: 2.7 CM
MAXIMAL PREDICTED HEART RATE: 144 BPM
SINUS: 4.1 CM
STRESS TARGET HR: 122 BPM

## 2024-09-03 PROCEDURE — 63710000001: Performed by: INTERNAL MEDICINE

## 2024-09-03 PROCEDURE — 76376 3D RENDER W/INTRP POSTPROCES: CPT

## 2024-09-03 PROCEDURE — A9270 NON-COVERED ITEM OR SERVICE: HCPCS | Performed by: INTERNAL MEDICINE

## 2024-09-03 PROCEDURE — 93312 ECHO TRANSESOPHAGEAL: CPT

## 2024-09-03 PROCEDURE — 63710000001 LIDOCAINE VISCOUS HCL 2 % SOLUTION 15 ML CUP: Performed by: INTERNAL MEDICINE

## 2024-09-03 PROCEDURE — 93325 DOPPLER ECHO COLOR FLOW MAPG: CPT

## 2024-09-03 PROCEDURE — 99152 MOD SED SAME PHYS/QHP 5/>YRS: CPT

## 2024-09-03 PROCEDURE — 25010000002 MIDAZOLAM PER 1 MG: Performed by: INTERNAL MEDICINE

## 2024-09-03 PROCEDURE — 93321 DOPPLER ECHO F-UP/LMTD STD: CPT

## 2024-09-03 PROCEDURE — 25010000002 FENTANYL CITRATE (PF) 50 MCG/ML SOLUTION: Performed by: INTERNAL MEDICINE

## 2024-09-03 RX ORDER — SODIUM CHLORIDE 9 MG/ML
INJECTION, SOLUTION INTRAVENOUS
Status: COMPLETED | OUTPATIENT
Start: 2024-09-03 | End: 2024-09-03

## 2024-09-03 RX ORDER — FENTANYL CITRATE 50 UG/ML
INJECTION, SOLUTION INTRAMUSCULAR; INTRAVENOUS
Status: COMPLETED | OUTPATIENT
Start: 2024-09-03 | End: 2024-09-03

## 2024-09-03 RX ORDER — MIDAZOLAM HYDROCHLORIDE 5 MG/ML
INJECTION INTRAMUSCULAR; INTRAVENOUS
Status: COMPLETED | OUTPATIENT
Start: 2024-09-03 | End: 2024-09-03

## 2024-09-03 RX ORDER — LIDOCAINE HYDROCHLORIDE 20 MG/ML
SOLUTION OROPHARYNGEAL
Status: COMPLETED | OUTPATIENT
Start: 2024-09-03 | End: 2024-09-03

## 2024-09-03 RX ADMIN — MIDAZOLAM 2 MG: 5 INJECTION INTRAMUSCULAR; INTRAVENOUS at 08:31

## 2024-09-03 RX ADMIN — MIDAZOLAM 1 MG: 5 INJECTION INTRAMUSCULAR; INTRAVENOUS at 08:49

## 2024-09-03 RX ADMIN — MIDAZOLAM 1 MG: 5 INJECTION INTRAMUSCULAR; INTRAVENOUS at 08:33

## 2024-09-03 RX ADMIN — FENTANYL CITRATE 50 MCG: 50 INJECTION, SOLUTION INTRAMUSCULAR; INTRAVENOUS at 08:31

## 2024-09-03 RX ADMIN — LIDOCAINE HYDROCHLORIDE 10 ML: 20 SOLUTION ORAL at 08:09

## 2024-09-03 RX ADMIN — FENTANYL CITRATE 25 MCG: 50 INJECTION, SOLUTION INTRAMUSCULAR; INTRAVENOUS at 08:53

## 2024-09-03 RX ADMIN — MIDAZOLAM 1 MG: 5 INJECTION INTRAMUSCULAR; INTRAVENOUS at 08:36

## 2024-09-03 RX ADMIN — MIDAZOLAM 1 MG: 5 INJECTION INTRAMUSCULAR; INTRAVENOUS at 08:53

## 2024-09-03 RX ADMIN — FENTANYL CITRATE 25 MCG: 50 INJECTION, SOLUTION INTRAMUSCULAR; INTRAVENOUS at 08:33

## 2024-09-03 RX ADMIN — BENZOCAINE, BUTAMBEN, AND TETRACAINE HYDROCHLORIDE 1 SPRAY: .028; .004; .004 AEROSOL, SPRAY TOPICAL at 08:09

## 2024-09-03 RX ADMIN — FENTANYL CITRATE 25 MCG: 50 INJECTION, SOLUTION INTRAMUSCULAR; INTRAVENOUS at 08:36

## 2024-09-03 RX ADMIN — SODIUM CHLORIDE 50 ML/HR: 9 INJECTION, SOLUTION INTRAVENOUS at 08:09

## 2024-09-03 NOTE — TELEPHONE ENCOUNTER
I called and spoke with patient.  Reviewed severe aortic valve regurgitation on transesophageal echocardiogram completed yesterday as well as aorta dilation of 4.1 cm.  I discussed recommendation to have designated CTA of the chest for full evaluation of aorta size/anatomy and referral to Dr. Prasanth Martinez to discuss surgical intervention.  Patient is agreeable with plan.     Scheduling-he needs his CT of the chest in the next 2 to 3 weeks if possible.

## 2024-09-04 ENCOUNTER — TELEPHONE (OUTPATIENT)
Dept: CARDIOLOGY | Facility: CLINIC | Age: 74
End: 2024-09-04

## 2024-09-04 NOTE — TELEPHONE ENCOUNTER
I tried to call Roney Kuhn but there was no answer.  I left a detailed message relaying information from provider regarding relaying test results.    Thank you,    Marycarmen SOSA RN  Triage [facility] [place]  09/04/24 14:39 EDT

## 2024-09-12 ENCOUNTER — TRANSCRIBE ORDERS (OUTPATIENT)
Dept: ADMINISTRATIVE | Facility: HOSPITAL | Age: 74
End: 2024-09-12
Payer: MEDICARE

## 2024-09-12 ENCOUNTER — LAB (OUTPATIENT)
Facility: HOSPITAL | Age: 74
End: 2024-09-12
Payer: MEDICARE

## 2024-09-12 DIAGNOSIS — E78.5 HYPERLIPIDEMIA, UNSPECIFIED HYPERLIPIDEMIA TYPE: Primary | ICD-10-CM

## 2024-09-12 DIAGNOSIS — E78.5 HYPERLIPIDEMIA, UNSPECIFIED HYPERLIPIDEMIA TYPE: ICD-10-CM

## 2024-09-12 DIAGNOSIS — I48.91 ATRIAL FIBRILLATION, UNSPECIFIED TYPE: ICD-10-CM

## 2024-09-12 DIAGNOSIS — I10 ESSENTIAL HYPERTENSION, MALIGNANT: ICD-10-CM

## 2024-09-12 LAB
25(OH)D3 SERPL-MCNC: 36.1 NG/ML (ref 30–100)
ALBUMIN SERPL-MCNC: 4.2 G/DL (ref 3.5–5.2)
ALBUMIN/GLOB SERPL: 2 G/DL
ALP SERPL-CCNC: 54 U/L (ref 39–117)
ALT SERPL W P-5'-P-CCNC: 14 U/L (ref 1–41)
ANION GAP SERPL CALCULATED.3IONS-SCNC: 9.6 MMOL/L (ref 5–15)
AST SERPL-CCNC: 19 U/L (ref 1–40)
BASOPHILS # BLD AUTO: 0.02 10*3/MM3 (ref 0–0.2)
BASOPHILS NFR BLD AUTO: 0.3 % (ref 0–1.5)
BILIRUB SERPL-MCNC: 0.6 MG/DL (ref 0–1.2)
BUN SERPL-MCNC: 16 MG/DL (ref 8–23)
BUN/CREAT SERPL: 18.2 (ref 7–25)
CALCIUM SPEC-SCNC: 9.1 MG/DL (ref 8.6–10.5)
CHLORIDE SERPL-SCNC: 101 MMOL/L (ref 98–107)
CHOLEST SERPL-MCNC: 168 MG/DL (ref 0–200)
CO2 SERPL-SCNC: 27.4 MMOL/L (ref 22–29)
CREAT SERPL-MCNC: 0.88 MG/DL (ref 0.76–1.27)
DEPRECATED RDW RBC AUTO: 42.5 FL (ref 37–54)
EGFRCR SERPLBLD CKD-EPI 2021: 89.1 ML/MIN/1.73
EOSINOPHIL # BLD AUTO: 0.03 10*3/MM3 (ref 0–0.4)
EOSINOPHIL NFR BLD AUTO: 0.5 % (ref 0.3–6.2)
ERYTHROCYTE [DISTWIDTH] IN BLOOD BY AUTOMATED COUNT: 12.8 % (ref 12.3–15.4)
GLOBULIN UR ELPH-MCNC: 2.1 GM/DL
GLUCOSE SERPL-MCNC: 161 MG/DL (ref 65–99)
HCT VFR BLD AUTO: 43 % (ref 37.5–51)
HDLC SERPL-MCNC: 92 MG/DL (ref 40–60)
HGB BLD-MCNC: 14.7 G/DL (ref 13–17.7)
IMM GRANULOCYTES # BLD AUTO: 0.02 10*3/MM3 (ref 0–0.05)
IMM GRANULOCYTES NFR BLD AUTO: 0.3 % (ref 0–0.5)
LDLC SERPL CALC-MCNC: 62 MG/DL (ref 0–100)
LDLC/HDLC SERPL: 0.67 {RATIO}
LYMPHOCYTES # BLD AUTO: 0.72 10*3/MM3 (ref 0.7–3.1)
LYMPHOCYTES NFR BLD AUTO: 11.9 % (ref 19.6–45.3)
MCH RBC QN AUTO: 31.5 PG (ref 26.6–33)
MCHC RBC AUTO-ENTMCNC: 34.2 G/DL (ref 31.5–35.7)
MCV RBC AUTO: 92.1 FL (ref 79–97)
MONOCYTES # BLD AUTO: 0.45 10*3/MM3 (ref 0.1–0.9)
MONOCYTES NFR BLD AUTO: 7.4 % (ref 5–12)
NEUTROPHILS NFR BLD AUTO: 4.82 10*3/MM3 (ref 1.7–7)
NEUTROPHILS NFR BLD AUTO: 79.6 % (ref 42.7–76)
NRBC BLD AUTO-RTO: 0 /100 WBC (ref 0–0.2)
PLATELET # BLD AUTO: 220 10*3/MM3 (ref 140–450)
PMV BLD AUTO: 10.2 FL (ref 6–12)
POTASSIUM SERPL-SCNC: 4.2 MMOL/L (ref 3.5–5.2)
PROT SERPL-MCNC: 6.3 G/DL (ref 6–8.5)
RBC # BLD AUTO: 4.67 10*6/MM3 (ref 4.14–5.8)
SODIUM SERPL-SCNC: 138 MMOL/L (ref 136–145)
TRIGL SERPL-MCNC: 72 MG/DL (ref 0–150)
TSH SERPL DL<=0.05 MIU/L-ACNC: 1.58 UIU/ML (ref 0.27–4.2)
VIT B12 BLD-MCNC: 381 PG/ML (ref 211–946)
VLDLC SERPL-MCNC: 14 MG/DL (ref 5–40)
WBC NRBC COR # BLD AUTO: 6.06 10*3/MM3 (ref 3.4–10.8)

## 2024-09-12 PROCEDURE — 82607 VITAMIN B-12: CPT

## 2024-09-12 PROCEDURE — 82306 VITAMIN D 25 HYDROXY: CPT

## 2024-09-12 PROCEDURE — 80061 LIPID PANEL: CPT

## 2024-09-12 PROCEDURE — 84443 ASSAY THYROID STIM HORMONE: CPT

## 2024-09-12 PROCEDURE — 85025 COMPLETE CBC W/AUTO DIFF WBC: CPT

## 2024-09-12 PROCEDURE — 80053 COMPREHEN METABOLIC PANEL: CPT

## 2024-09-12 PROCEDURE — 36415 COLL VENOUS BLD VENIPUNCTURE: CPT

## 2024-09-16 ENCOUNTER — HOSPITAL ENCOUNTER (OUTPATIENT)
Dept: CT IMAGING | Facility: HOSPITAL | Age: 74
Discharge: HOME | End: 2024-09-16
Admitting: NURSE PRACTITIONER
Payer: MEDICARE

## 2024-09-16 DIAGNOSIS — I77.810 AORTIC ROOT DILATION: ICD-10-CM

## 2024-09-16 PROCEDURE — 71275 CT ANGIOGRAPHY CHEST: CPT

## 2024-09-16 PROCEDURE — 25510000001 IOPAMIDOL PER 1 ML: Performed by: NURSE PRACTITIONER

## 2024-09-16 RX ORDER — IOPAMIDOL 755 MG/ML
100 INJECTION, SOLUTION INTRAVASCULAR
Status: COMPLETED | OUTPATIENT
Start: 2024-09-16 | End: 2024-09-16

## 2024-09-16 RX ADMIN — IOPAMIDOL 95 ML: 755 INJECTION, SOLUTION INTRAVENOUS at 11:22

## 2024-09-18 ENCOUNTER — TELEPHONE (OUTPATIENT)
Dept: CARDIOLOGY | Facility: CLINIC | Age: 74
End: 2024-09-18
Payer: MEDICARE

## 2024-09-18 ENCOUNTER — TRANSCRIBE ORDERS (OUTPATIENT)
Dept: ADMINISTRATIVE | Facility: HOSPITAL | Age: 74
End: 2024-09-18
Payer: MEDICARE

## 2024-10-29 ENCOUNTER — OFFICE VISIT (OUTPATIENT)
Dept: CARDIAC SURGERY | Facility: CLINIC | Age: 74
End: 2024-10-29
Payer: MEDICARE

## 2024-10-29 VITALS
DIASTOLIC BLOOD PRESSURE: 87 MMHG | RESPIRATION RATE: 19 BRPM | OXYGEN SATURATION: 98 % | HEART RATE: 60 BPM | SYSTOLIC BLOOD PRESSURE: 172 MMHG | BODY MASS INDEX: 25.58 KG/M2 | WEIGHT: 193 LBS | HEIGHT: 73 IN

## 2024-10-29 DIAGNOSIS — I35.1 NONRHEUMATIC AORTIC VALVE INSUFFICIENCY: ICD-10-CM

## 2024-10-29 DIAGNOSIS — R53.82 CHRONIC FATIGUE: ICD-10-CM

## 2024-10-29 DIAGNOSIS — I48.19 PERSISTENT ATRIAL FIBRILLATION: ICD-10-CM

## 2024-10-29 DIAGNOSIS — E78.00 HYPERCHOLESTEROLEMIA: ICD-10-CM

## 2024-10-29 DIAGNOSIS — I10 PRIMARY HYPERTENSION: Primary | ICD-10-CM

## 2024-10-29 PROCEDURE — 1160F RVW MEDS BY RX/DR IN RCRD: CPT | Performed by: THORACIC SURGERY (CARDIOTHORACIC VASCULAR SURGERY)

## 2024-10-29 PROCEDURE — 1159F MED LIST DOCD IN RCRD: CPT | Performed by: THORACIC SURGERY (CARDIOTHORACIC VASCULAR SURGERY)

## 2024-10-29 PROCEDURE — 3079F DIAST BP 80-89 MM HG: CPT | Performed by: THORACIC SURGERY (CARDIOTHORACIC VASCULAR SURGERY)

## 2024-10-29 PROCEDURE — 99204 OFFICE O/P NEW MOD 45 MIN: CPT | Performed by: THORACIC SURGERY (CARDIOTHORACIC VASCULAR SURGERY)

## 2024-10-29 PROCEDURE — 3077F SYST BP >= 140 MM HG: CPT | Performed by: THORACIC SURGERY (CARDIOTHORACIC VASCULAR SURGERY)

## 2024-10-29 NOTE — LETTER
November 3, 2024     KARI Lockhart  [address]  [address] [address]    Patient: Roney Kuhn   YOB: 1950   Date of Visit: 10/29/2024     Dear KARI Lockhart:       Thank you for referring Roney Kuhn to me for evaluation. Below are the relevant portions of my assessment and plan of care.    If you have questions, please do not hesitate to call me. I look forward to following [place] along with you.         Sincerely,        Prasanth Martinez MD        CC: MD Juan Turcios, MD Prasanth  11/03/24 1712  Sign when Signing Visit  11/3/2024    Seen on 10/29/2024    Chief Complaint     Fatigue, evaluation of aortic insufficiency    History of Present Illness:       Deamohini Leija and Colleagues,  It was nice to see Roney Kuhn in consultation at your request. He is a 74 y.o. male with known primary hypertension, hypercholesterolemia, hyperglycemia, nephrolithiasis, atrial fibrillation, asthma, cardiomyopathy who has developed progressive low-level dyspnea and fatigue. He denies syncope, TIA, orthopnea, PND or lower extremity edema.  [place] an [place] and he was treated in a nearby hospital and treated for pneumonia and was told that he had a atrial fibrillation.  At that time an echo Carton showed an ejection fraction of 45 to 50%, dilated left ventricle and aortic root measuring 4.3 cm.  In May 2024 the patient developed further atrial fibrillation and he was cardioverted successfully.  He is presently active and he goes to the gym he walks quite a bit.  He reports the fatigue and apparently is related to the beta-blocker treatment.  Overall he enjoys a good level of activity and quality of life.  A 2D echo was performed that showed significant AI therefore a MICHELLE was performed that showed an ejection fraction of 56 to 60%, left ventricle cavity dilated, severe aortic regurgitation on a  trileaflet aortic valve with poor coaptation between the right and left coronary cusps.  He had a CT scan that showed the aortic root of 4.3 cm and the ascending aorta at 3.7 cm.      Patient Active Problem List   Diagnosis   • Fatigue   • Primary hypertension   • Hypercholesterolemia   • Overactive bladder   • Chronic nasal congestion   • Hyperglycemia   • History of nephrolithiasis   • History of 2019 novel coronavirus disease (COVID-19)   • Chronic pain of both shoulders   • Left upper quadrant abdominal mass   • Localized skin mass, lump, or swelling   • Primary insomnia   • Abnormal laboratory test result   • Persistent atrial fibrillation   • Nonrheumatic aortic valve insufficiency       Past Medical History:   Diagnosis Date   • Aortic root dilation    • Asthma    • Atrial fibrillation    • Back problem    • Basal cell carcinoma    • Cardiomyopathy     EF 45-50 % in 04/2024 on scanned media   • Claustrophobia    • Hyperlipidemia    • Hypertension    • Kidney stones        Past Surgical History:   Procedure Laterality Date   • APPENDECTOMY     • BASAL CELL CARCINOMA EXCISION     • COLONOSCOPY      Date unknown   • EYE SURGERY     • HERNIA REPAIR     • KIDNEY STONE SURGERY     • KNEE SURGERY Right    • SHOULDER SURGERY Right    • WISDOM TOOTH EXTRACTION         No Known Allergies      Current Outpatient Medications:   •  Eliquis 5 MG tablet tablet, Take 1 tablet by mouth Every 12 (Twelve) Hours., Disp: 180 tablet, Rfl: 3  •  lisinopril-hydrochlorothiazide (PRINZIDE,ZESTORETIC) 20-12.5 MG per tablet, Take 1 tablet by mouth Daily., Disp: , Rfl:   •  metoprolol tartrate (LOPRESSOR) 25 MG tablet, Take 0.5 tablets by mouth 2 (Two) Times a Day., Disp: , Rfl:   •  rosuvastatin (CRESTOR) 5 MG tablet, Take 1 tablet by mouth Daily., Disp: 90 tablet, Rfl: 3  •  TAMSULOSIN HCL PO, , Disp: , Rfl:   •  traZODone (DESYREL) 50 MG tablet, TAKE ONE TABLET BY MOUTH ONCE NIGHTLY, Disp: 90 tablet, Rfl: 1    Social History      Socioeconomic History   • Marital status:    • Number of children: 3   Tobacco Use   • Smoking status: Never     Passive exposure: Never   • Smokeless tobacco: Never   • Tobacco comments:     Caffeine - 2 cups coffee daily    Vaping Use   • Vaping status: Never Used   Substance and Sexual Activity   • Alcohol use: Yes     Alcohol/week: 7.0 standard drinks of alcohol     Types: 7 Drinks containing 0.5 oz of alcohol per week     Comment: underdesirable taste   • Drug use: No   • Sexual activity: Not Currently     Partners: Female     Birth control/protection: Vasectomy       Family History   Problem Relation Age of Onset   • Hypertension Father    • Hyperlipidemia Father         blood clots   • Arthritis Father    • Clotting disorder Father    • Heart attack Father    • Heart disease Father         Heart Attack   • Breast cancer Sister    • Arthritis Mother    • Hypertension Brother      Review of Systems:  As HPI, otherwise noncontributory    Physical Exam:    Vital Signs:  Weight: 87.5 kg (193 lb)   Body mass index is 25.46 kg/m².  Temp: (not recorded)   Heart Rate: 60   BP: 172/87     Constitutional:       Appearance: Well-developed.   Eyes:      Conjunctiva/sclera: Conjunctivae normal.      Pupils: Pupils are equal, round, and reactive to light.   HENT:      Head: Normocephalic and atraumatic.      Nose: Nose normal.   Neck:      Thyroid: No thyromegaly.      Vascular: No JVD.      Lymphadenopathy: No cervical adenopathy.   Pulmonary:      Effort: Pulmonary effort is normal.      Breath sounds: Normal breath sounds. No rales.   Cardiovascular:      Normal rate. Regular rhythm.      Murmurs: There is a high frequency blowing decrescendo, early diastolic murmur at the URSB, radiating to the apex.      No gallop.    Pulses:     Intact distal pulses. No decreased pulses.   Edema:     Peripheral edema absent.   Abdominal:      General: Bowel sounds are normal. There is no distension.      Palpations: Abdomen  is soft. There is no abdominal mass.      Tenderness: There is no abdominal tenderness.   Musculoskeletal: Normal range of motion.         General: No tenderness or deformity.      Cervical back: Normal range of motion and neck supple. Skin:     General: Skin is warm and dry.      Findings: No erythema or rash.   Neurological:      Mental Status: Alert and oriented to person, place, and time.      Deep Tendon Reflexes: Reflexes are normal and symmetric.   Psychiatric:         Behavior: Behavior normal.          Assessment:     Problems Addressed this Visit          Cardiac and Vasculature    Primary hypertension - Primary    Hypercholesterolemia    Persistent atrial fibrillation    Nonrheumatic aortic valve insufficiency       Symptoms and Signs    Fatigue     Diagnoses         Codes Comments    Primary hypertension    -  Primary ICD-10-CM: I10  ICD-9-CM: 401.9     Hypercholesterolemia     ICD-10-CM: E78.00  ICD-9-CM: 272.0     Persistent atrial fibrillation     ICD-10-CM: I48.19  ICD-9-CM: 427.31     Nonrheumatic aortic valve insufficiency     ICD-10-CM: I35.1  ICD-9-CM: 424.1     Chronic fatigue     ICD-10-CM: R53.82  ICD-9-CM: 780.79             Assessment/recommendation:     Severe nonrheumatic aortic insufficiency with mild symptoms of fatigue which are in a specific but there is unfortunately LV dilatation.  Given these findings, the natural history shows that the ventricle will continue to dilate therefore I think is reasonable to offer an aortic valve repair or replacement.  I recommend aortic valve versus root replacement to prolong survival, symptomatic relief and to prevent adverse events.  Discussed the risk (STS calculated), benefits and terms of surgery and he will decide and tell us what he wants to proceed.  He will need to have a cardiac cath, pulmonary function test and other routine preoperative studies before surgery.  Given his age I recommend a biologic prosthesis versus aortic root plasty  versus root replacement.  Paroxysmal atrial fibrillation status post cardioversion which was successful.  Patient has sleep surgery then a left atrial appendage closure versus maze procedure should be considered.  10 mm nodules in the right lung.  Recommend follow-up with chest CTs yearly.  There is enlargement and biopsy versus PET scan should be recommended    Thank you for allowing me to participate in his care.    Regards,    Prasanth Martinez M.D.    Spent over 45 minutes before, during after the office visit and reviewing the records, examining the patient, reviewing interpreting myself the echocardiogram chest CT and MICHELLE, spent time in discussing the findings and options, discussed my recommendation of surgery to prolong a symptomatic relief and to prevent adverse events and spent time discussing the risk and benefits and type of prosthesis involved in such surgery, STS risk calculated and spent time in documenting in electronic record in the case with the cardiology team

## 2024-10-30 ENCOUNTER — PATIENT ROUNDING (BHMG ONLY) (OUTPATIENT)
Dept: CARDIAC SURGERY | Facility: CLINIC | Age: 74
End: 2024-10-30
Payer: MEDICARE

## 2024-10-30 NOTE — PROGRESS NOTES
A My Chart message has [place] the patient [place] [facility] [place]

## 2024-11-03 NOTE — PROGRESS NOTES
11/3/2024    Seen on 10/29/2024    Chief Complaint     Fatigue, evaluation of aortic insufficiency    History of Present Illness:       Dear Liss and Colleagues,  It was nice to see Roney SABRINA Hattie in consultation at your request. He is a 74 y.o. male with known primary hypertension, hypercholesterolemia, hyperglycemia, nephrolithiasis, atrial fibrillation, asthma, cardiomyopathy who has developed progressive low-level dyspnea and fatigue. He denies syncope, TIA, orthopnea, PND or lower extremity edema.  [place] an [place] and he was treated in a nearby hospital and treated for pneumonia and was told that he had a atrial fibrillation.  At that time an echo Carton showed an ejection fraction of 45 to 50%, dilated left ventricle and aortic root measuring 4.3 cm.  In May 2024 the patient developed further atrial fibrillation and he was cardioverted successfully.  He is presently active and he goes to the gym he walks quite a bit.  He reports the fatigue and apparently is related to the beta-blocker treatment.  Overall he enjoys a good level of activity and quality of life.  A 2D echo was performed that showed significant AI therefore a MICHELLE was performed that showed an ejection fraction of 56 to 60%, left ventricle cavity dilated, severe aortic regurgitation on a trileaflet aortic valve with poor coaptation between the right and left coronary cusps.  He had a CT scan that showed the aortic root of 4.3 cm and the ascending aorta at 3.7 cm.      Patient Active Problem List   Diagnosis    Fatigue    Primary hypertension    Hypercholesterolemia    Overactive bladder    Chronic nasal congestion    Hyperglycemia    History of nephrolithiasis    History of 2019 novel coronavirus disease (COVID-19)    Chronic pain of both shoulders    Left upper quadrant abdominal mass    Localized skin mass, lump, or swelling    Primary insomnia    Abnormal laboratory test  result    Persistent atrial fibrillation    Nonrheumatic aortic valve insufficiency       Past Medical History:   Diagnosis Date    Aortic root dilation     Asthma     Atrial fibrillation     Back problem     Basal cell carcinoma     Cardiomyopathy     EF 45-50 % in 04/2024 on scanned media    Claustrophobia     Hyperlipidemia     Hypertension     Kidney stones        Past Surgical History:   Procedure Laterality Date    APPENDECTOMY      BASAL CELL CARCINOMA EXCISION      COLONOSCOPY      Date unknown    EYE SURGERY      HERNIA REPAIR      KIDNEY STONE SURGERY      KNEE SURGERY Right     SHOULDER SURGERY Right     WISDOM TOOTH EXTRACTION         No Known Allergies      Current Outpatient Medications:     Eliquis 5 MG tablet tablet, Take 1 tablet by mouth Every 12 (Twelve) Hours., Disp: 180 tablet, Rfl: 3    lisinopril-hydrochlorothiazide (PRINZIDE,ZESTORETIC) 20-12.5 MG per tablet, Take 1 tablet by mouth Daily., Disp: , Rfl:     metoprolol tartrate (LOPRESSOR) 25 MG tablet, Take 0.5 tablets by mouth 2 (Two) Times a Day., Disp: , Rfl:     rosuvastatin (CRESTOR) 5 MG tablet, Take 1 tablet by mouth Daily., Disp: 90 tablet, Rfl: 3    TAMSULOSIN HCL PO, , Disp: , Rfl:     traZODone (DESYREL) 50 MG tablet, TAKE ONE TABLET BY MOUTH ONCE NIGHTLY, Disp: 90 tablet, Rfl: 1    Social History     Socioeconomic History    Marital status:     Number of children: 3   Tobacco Use    Smoking status: Never     Passive exposure: Never    Smokeless tobacco: Never    Tobacco comments:     Caffeine - 2 cups coffee daily    Vaping Use    Vaping status: Never Used   Substance and Sexual Activity    Alcohol use: Yes     Alcohol/week: 7.0 standard drinks of alcohol     Types: 7 Drinks containing 0.5 oz of alcohol per week     Comment: underdesirable taste    Drug use: No    Sexual activity: Not Currently     Partners: Female     Birth control/protection: Vasectomy       Family History   Problem Relation Age of Onset    Hypertension  Father     Hyperlipidemia Father         blood clots    Arthritis Father     Clotting disorder Father     Heart attack Father     Heart disease Father         Heart Attack    Breast cancer Sister     Arthritis Mother     Hypertension Brother      Review of Systems:  As HPI, otherwise noncontributory    Physical Exam:    Vital Signs:  Weight: 87.5 kg (193 lb)   Body mass index is 25.46 kg/m².  Temp: (not recorded)   Heart Rate: 60   BP: 172/87     Constitutional:       Appearance: Well-developed.   Eyes:      Conjunctiva/sclera: Conjunctivae normal.      Pupils: Pupils are equal, round, and reactive to light.   HENT:      Head: Normocephalic and atraumatic.      Nose: Nose normal.   Neck:      Thyroid: No thyromegaly.      Vascular: No JVD.      Lymphadenopathy: No cervical adenopathy.   Pulmonary:      Effort: Pulmonary effort is normal.      Breath sounds: Normal breath sounds. No rales.   Cardiovascular:      Normal rate. Regular rhythm.      Murmurs: There is a high frequency blowing decrescendo, early diastolic murmur at the URSB, radiating to the apex.      No gallop.    Pulses:     Intact distal pulses. No decreased pulses.   Edema:     Peripheral edema absent.   Abdominal:      General: Bowel sounds are normal. There is no distension.      Palpations: Abdomen is soft. There is no abdominal mass.      Tenderness: There is no abdominal tenderness.   Musculoskeletal: Normal range of motion.         General: No tenderness or deformity.      Cervical back: Normal range of motion and neck supple. Skin:     General: Skin is warm and dry.      Findings: No erythema or rash.   Neurological:      Mental Status: Alert and oriented to person, place, and time.      Deep Tendon Reflexes: Reflexes are normal and symmetric.   Psychiatric:         Behavior: Behavior normal.          Assessment:     Problems Addressed this Visit          Cardiac and Vasculature    Primary hypertension - Primary    Hypercholesterolemia     Persistent atrial fibrillation    Nonrheumatic aortic valve insufficiency       Symptoms and Signs    Fatigue     Diagnoses         Codes Comments    Primary hypertension    -  Primary ICD-10-CM: I10  ICD-9-CM: 401.9     Hypercholesterolemia     ICD-10-CM: E78.00  ICD-9-CM: 272.0     Persistent atrial fibrillation     ICD-10-CM: I48.19  ICD-9-CM: 427.31     Nonrheumatic aortic valve insufficiency     ICD-10-CM: I35.1  ICD-9-CM: 424.1     Chronic fatigue     ICD-10-CM: R53.82  ICD-9-CM: 780.79             Assessment/recommendation:     Severe nonrheumatic aortic insufficiency with mild symptoms of fatigue which are in a specific but there is unfortunately LV dilatation.  Given these findings, the natural history shows that the ventricle will continue to dilate therefore I think is reasonable to offer an aortic valve repair or replacement.  I recommend aortic valve versus root replacement to prolong survival, symptomatic relief and to prevent adverse events.  Discussed the risk (STS calculated), benefits and terms of surgery and he will decide and tell us what he wants to proceed.  He will need to have a cardiac cath, pulmonary function test and other routine preoperative studies before surgery.  Given his age I recommend a biologic prosthesis versus aortic root plasty versus root replacement.  Paroxysmal atrial fibrillation status post cardioversion which was successful.  Patient has sleep surgery then a left atrial appendage closure versus maze procedure should be considered.  10 mm nodules in the right lung.  Recommend follow-up with chest CTs yearly.  There is enlargement and biopsy versus PET scan should be recommended    Thank you for allowing me to participate in his care.    Regards,    Prasanth Martinez M.D.    Spent over 45 minutes before, during after the office visit and reviewing the records, examining the patient, reviewing interpreting myself the echocardiogram chest CT and MICHELLE, spent time in discussing  the findings and options, discussed my recommendation of surgery to prolong a symptomatic relief and to prevent adverse events and spent time discussing the risk and benefits and type of prosthesis involved in such surgery, STS risk calculated and spent time in documenting in electronic record in the case with the cardiology team

## 2024-11-04 ENCOUNTER — TELEPHONE (OUTPATIENT)
Dept: CARDIOLOGY | Facility: CLINIC | Age: 74
End: 2024-11-04
Payer: MEDICARE

## 2024-11-04 DIAGNOSIS — Z01.810 PREOPERATIVE CARDIOVASCULAR EXAMINATION: ICD-10-CM

## 2024-11-04 DIAGNOSIS — I35.1 NONRHEUMATIC AORTIC VALVE INSUFFICIENCY: ICD-10-CM

## 2024-11-04 DIAGNOSIS — R91.1 NODULE OF LOWER LOBE OF RIGHT LUNG: Primary | ICD-10-CM

## 2024-11-04 NOTE — TELEPHONE ENCOUNTER
I left a brief but detailed voicemail explaining recommendations from Dr. Martinez.  Patient needs a heart cath.  My office will coordinate that.    PFT and pre-op testing per Dr. Martinez    Follow up CT needed 12/16/24-01/16/2025 to remeasure lung nodule and if any growth then biopsy vs PET will be needed.  I went ahead and placed an order for this

## 2024-11-06 DIAGNOSIS — R91.1 LUNG NODULE: ICD-10-CM

## 2024-11-06 DIAGNOSIS — Z01.818 PREOP TESTING: Primary | ICD-10-CM

## 2024-11-06 DIAGNOSIS — R09.81 CHRONIC NASAL CONGESTION: ICD-10-CM

## 2024-11-06 NOTE — TELEPHONE ENCOUNTER
I spoke with patient for 12 minutes and discuss plan moving forward with CT chest to remeasure lung nodule.  If lung nodule is enlarging we will pursue further evaluation with multidisciplinary team to navigate options of biopsy versus PET etc.    We also discussed need for cath and case request is ordered.  Risks were explained and need for  was discussed.  He knows he will receive a call from hospital scheduling to coordinate.  He was asking when his pulmonary function testing would be scheduled.  I told him I do not have this information but I can confirm that his PFT was ordered today.  He was appreciative of my call.  I will help to make sure he has follow-up after his cath

## 2024-11-06 NOTE — TELEPHONE ENCOUNTER
Patient returned your call.  He can be reached at [phone].  He asked if he could speak with you./ ANTHONY

## 2024-11-11 ENCOUNTER — TRANSCRIBE ORDERS (OUTPATIENT)
Dept: CARDIOLOGY | Facility: CLINIC | Age: 74
End: 2024-11-11
Payer: MEDICARE

## 2024-11-11 DIAGNOSIS — Z13.6 SCREENING FOR ISCHEMIC HEART DISEASE: ICD-10-CM

## 2024-11-11 DIAGNOSIS — Z01.810 PRE-OPERATIVE CARDIOVASCULAR EXAMINATION: Primary | ICD-10-CM

## 2024-11-18 ENCOUNTER — HOSPITAL ENCOUNTER (OUTPATIENT)
Dept: CT IMAGING | Facility: HOSPITAL | Age: 74
Discharge: HOME | End: 2024-11-18
Payer: MEDICARE

## 2024-11-18 ENCOUNTER — LAB (OUTPATIENT)
Facility: HOSPITAL | Age: 74
End: 2024-11-18
Payer: MEDICARE

## 2024-11-18 DIAGNOSIS — Z01.818 PREOP TESTING: ICD-10-CM

## 2024-11-18 DIAGNOSIS — Z13.6 SCREENING FOR ISCHEMIC HEART DISEASE: ICD-10-CM

## 2024-11-18 DIAGNOSIS — R91.1 NODULE OF LOWER LOBE OF RIGHT LUNG: ICD-10-CM

## 2024-11-18 DIAGNOSIS — Z01.810 PRE-OPERATIVE CARDIOVASCULAR EXAMINATION: ICD-10-CM

## 2024-11-18 DIAGNOSIS — R91.1 LUNG NODULE: ICD-10-CM

## 2024-11-18 LAB
ANION GAP SERPL CALCULATED.3IONS-SCNC: 11 MMOL/L (ref 5–15)
BASOPHILS # BLD AUTO: 0.02 10*3/MM3 (ref 0–0.2)
BASOPHILS NFR BLD AUTO: 0.5 % (ref 0–1.5)
BUN SERPL-MCNC: 17 MG/DL (ref 8–23)
BUN/CREAT SERPL: 18.5 (ref 7–25)
CALCIUM SPEC-SCNC: 9.2 MG/DL (ref 8.6–10.5)
CHLORIDE SERPL-SCNC: 102 MMOL/L (ref 98–107)
CO2 SERPL-SCNC: 26 MMOL/L (ref 22–29)
CREAT SERPL-MCNC: 0.92 MG/DL (ref 0.76–1.27)
DEPRECATED RDW RBC AUTO: 42.1 FL (ref 37–54)
EGFRCR SERPLBLD CKD-EPI 2021: 86.2 ML/MIN/1.73
EOSINOPHIL # BLD AUTO: 0.08 10*3/MM3 (ref 0–0.4)
EOSINOPHIL NFR BLD AUTO: 2.2 % (ref 0.3–6.2)
ERYTHROCYTE [DISTWIDTH] IN BLOOD BY AUTOMATED COUNT: 12.6 % (ref 12.3–15.4)
GLUCOSE SERPL-MCNC: 125 MG/DL (ref 65–99)
HCT VFR BLD AUTO: 41.9 % (ref 37.5–51)
HGB BLD-MCNC: 14.5 G/DL (ref 13–17.7)
IMM GRANULOCYTES # BLD AUTO: 0.01 10*3/MM3 (ref 0–0.05)
IMM GRANULOCYTES NFR BLD AUTO: 0.3 % (ref 0–0.5)
LYMPHOCYTES # BLD AUTO: 0.77 10*3/MM3 (ref 0.7–3.1)
LYMPHOCYTES NFR BLD AUTO: 21 % (ref 19.6–45.3)
MCH RBC QN AUTO: 31.7 PG (ref 26.6–33)
MCHC RBC AUTO-ENTMCNC: 34.6 G/DL (ref 31.5–35.7)
MCV RBC AUTO: 91.7 FL (ref 79–97)
MONOCYTES # BLD AUTO: 0.38 10*3/MM3 (ref 0.1–0.9)
MONOCYTES NFR BLD AUTO: 10.4 % (ref 5–12)
NEUTROPHILS NFR BLD AUTO: 2.41 10*3/MM3 (ref 1.7–7)
NEUTROPHILS NFR BLD AUTO: 65.6 % (ref 42.7–76)
NRBC BLD AUTO-RTO: 0 /100 WBC (ref 0–0.2)
PLATELET # BLD AUTO: 202 10*3/MM3 (ref 140–450)
PMV BLD AUTO: 10.1 FL (ref 6–12)
POTASSIUM SERPL-SCNC: 3.8 MMOL/L (ref 3.5–5.2)
RBC # BLD AUTO: 4.57 10*6/MM3 (ref 4.14–5.8)
SODIUM SERPL-SCNC: 139 MMOL/L (ref 136–145)
WBC NRBC COR # BLD AUTO: 3.67 10*3/MM3 (ref 3.4–10.8)

## 2024-11-18 PROCEDURE — 71250 CT THORAX DX C-: CPT

## 2024-11-18 PROCEDURE — 80048 BASIC METABOLIC PNL TOTAL CA: CPT

## 2024-11-18 PROCEDURE — 36415 COLL VENOUS BLD VENIPUNCTURE: CPT

## 2024-11-18 PROCEDURE — 85025 COMPLETE CBC W/AUTO DIFF WBC: CPT

## 2024-11-20 ENCOUNTER — TELEPHONE (OUTPATIENT)
Dept: CARDIOLOGY | Facility: CLINIC | Age: 74
End: 2024-11-20
Payer: MEDICARE

## 2024-11-20 ENCOUNTER — HOSPITAL ENCOUNTER (OUTPATIENT)
Facility: HOSPITAL | Age: 74
Setting detail: HOSPITAL OUTPATIENT SURGERY
Discharge: HOME | End: 2024-11-20
Attending: INTERNAL MEDICINE | Admitting: INTERNAL MEDICINE
Payer: MEDICARE

## 2024-11-20 VITALS
HEART RATE: 55 BPM | BODY MASS INDEX: 25.58 KG/M2 | RESPIRATION RATE: 16 BRPM | TEMPERATURE: 97.4 F | HEIGHT: 73 IN | DIASTOLIC BLOOD PRESSURE: 55 MMHG | WEIGHT: 193 LBS | OXYGEN SATURATION: 97 % | SYSTOLIC BLOOD PRESSURE: 135 MMHG

## 2024-11-20 DIAGNOSIS — I35.1 NONRHEUMATIC AORTIC VALVE INSUFFICIENCY: ICD-10-CM

## 2024-11-20 DIAGNOSIS — R91.1 NODULE OF LOWER LOBE OF RIGHT LUNG: Primary | ICD-10-CM

## 2024-11-20 DIAGNOSIS — Z01.810 PREOPERATIVE CARDIOVASCULAR EXAMINATION: ICD-10-CM

## 2024-11-20 PROCEDURE — 25010000002 LIDOCAINE 2% SOLUTION: Performed by: INTERNAL MEDICINE

## 2024-11-20 PROCEDURE — 93458 L HRT ARTERY/VENTRICLE ANGIO: CPT | Performed by: INTERNAL MEDICINE

## 2024-11-20 PROCEDURE — S0260 H&P FOR SURGERY: HCPCS | Performed by: INTERNAL MEDICINE

## 2024-11-20 PROCEDURE — 25010000002 FENTANYL CITRATE (PF) 50 MCG/ML SOLUTION: Performed by: INTERNAL MEDICINE

## 2024-11-20 PROCEDURE — 25510000001 IOPAMIDOL PER 1 ML: Performed by: INTERNAL MEDICINE

## 2024-11-20 PROCEDURE — C1769 GUIDE WIRE: HCPCS | Performed by: INTERNAL MEDICINE

## 2024-11-20 PROCEDURE — 25810000003 SODIUM CHLORIDE 0.9 % SOLUTION: Performed by: INTERNAL MEDICINE

## 2024-11-20 PROCEDURE — 25010000002 MIDAZOLAM PER 1 MG: Performed by: INTERNAL MEDICINE

## 2024-11-20 PROCEDURE — C1894 INTRO/SHEATH, NON-LASER: HCPCS | Performed by: INTERNAL MEDICINE

## 2024-11-20 PROCEDURE — 25010000002 HEPARIN (PORCINE) PER 1000 UNITS: Performed by: INTERNAL MEDICINE

## 2024-11-20 RX ORDER — SODIUM CHLORIDE 9 MG/ML
75 INJECTION, SOLUTION INTRAVENOUS CONTINUOUS
Status: DISCONTINUED | OUTPATIENT
Start: 2024-11-20 | End: 2024-11-20 | Stop reason: HOSPADM

## 2024-11-20 RX ORDER — VERAPAMIL HYDROCHLORIDE 2.5 MG/ML
INJECTION, SOLUTION INTRAVENOUS
Status: DISCONTINUED | OUTPATIENT
Start: 2024-11-20 | End: 2024-11-20 | Stop reason: HOSPADM

## 2024-11-20 RX ORDER — IOPAMIDOL 755 MG/ML
INJECTION, SOLUTION INTRAVASCULAR
Status: DISCONTINUED | OUTPATIENT
Start: 2024-11-20 | End: 2024-11-20 | Stop reason: HOSPADM

## 2024-11-20 RX ORDER — ACETAMINOPHEN 325 MG/1
650 TABLET ORAL EVERY 4 HOURS PRN
Status: DISCONTINUED | OUTPATIENT
Start: 2024-11-20 | End: 2024-11-20 | Stop reason: HOSPADM

## 2024-11-20 RX ORDER — MIDAZOLAM HYDROCHLORIDE 1 MG/ML
INJECTION, SOLUTION INTRAMUSCULAR; INTRAVENOUS
Status: DISCONTINUED | OUTPATIENT
Start: 2024-11-20 | End: 2024-11-20 | Stop reason: HOSPADM

## 2024-11-20 RX ORDER — SODIUM CHLORIDE 0.9 % (FLUSH) 0.9 %
10 SYRINGE (ML) INJECTION EVERY 12 HOURS SCHEDULED
Status: DISCONTINUED | OUTPATIENT
Start: 2024-11-20 | End: 2024-11-20 | Stop reason: HOSPADM

## 2024-11-20 RX ORDER — FENTANYL CITRATE 50 UG/ML
INJECTION, SOLUTION INTRAMUSCULAR; INTRAVENOUS
Status: DISCONTINUED | OUTPATIENT
Start: 2024-11-20 | End: 2024-11-20 | Stop reason: HOSPADM

## 2024-11-20 RX ORDER — SODIUM CHLORIDE 9 MG/ML
40 INJECTION, SOLUTION INTRAVENOUS AS NEEDED
Status: DISCONTINUED | OUTPATIENT
Start: 2024-11-20 | End: 2024-11-20 | Stop reason: HOSPADM

## 2024-11-20 RX ORDER — SODIUM CHLORIDE 0.9 % (FLUSH) 0.9 %
10 SYRINGE (ML) INJECTION AS NEEDED
Status: DISCONTINUED | OUTPATIENT
Start: 2024-11-20 | End: 2024-11-20 | Stop reason: HOSPADM

## 2024-11-20 RX ORDER — LIDOCAINE HYDROCHLORIDE 20 MG/ML
INJECTION, SOLUTION INFILTRATION; PERINEURAL
Status: DISCONTINUED | OUTPATIENT
Start: 2024-11-20 | End: 2024-11-20 | Stop reason: HOSPADM

## 2024-11-20 RX ORDER — HEPARIN SODIUM 1000 [USP'U]/ML
INJECTION, SOLUTION INTRAVENOUS; SUBCUTANEOUS
Status: DISCONTINUED | OUTPATIENT
Start: 2024-11-20 | End: 2024-11-20 | Stop reason: HOSPADM

## 2024-11-20 RX ADMIN — SODIUM CHLORIDE 75 ML/HR: 9 INJECTION, SOLUTION INTRAVENOUS at 08:00

## 2024-11-20 NOTE — H&P
[facility] History And Physical Assessment    Patient Name: Roney Kuhn  Age/Sex: 74 y.o. male  : 1950  MRN: [id number]    Date of Hospital Admission: 2024  Date of Encounter Visit: 24  Encounter Provider: Deisy Keller MD  Place of Service: [facility]  Patient Care Team:  Lizz Wiggins MD as PCP - General (Family Medicine)    Subjective:     Chief Complaint:  Severe aortic valve regurgitation    History of Present Illness:  See HPI from 2024.  Since then an echocardiogram showed moderate to severe aortic valve regurgitation.  MICHELLE was performed on 9/3/2024 which showed evidence of severe aortic valve regurgitation.  CT angiogram of the chest showed evidence of mild aortic root dilatation measuring 4.3 cm.  There is also a 10 mm nodular density in the posterior right lower lobe for which she has been referred to the multidisciplinary clinic.  We was also evaluated by Dr. Martinez who recommended proceeding with further workup for surgery.  He presents today for preoperative cardiac catheterization.    HPI From 2024.  Roney Kuhn is a 74 y.o. male  with atrial fibrillation, aortic valve insufficiency, hypertension, hyperlipidemia, cardiomyopathy, aortic root dilation, BPH.         He is followed by Dr. Mi.  I will visit with him for the first time I have reviewed his medical record.     In April he was on vacation in [facility] and had an aggressive cough.  He went to urgent care and ultimately was admitted to the nearest hospital.  He was treated for pneumonia and told he had A-fib.  He had an echocardiogram which showed ejection fraction of 45-50% and dilated left ventricle, aortic root measuring 4.3 cm, mild mitral valve regurgitation, mild to moderate tricuspid valve regurgitation, mildly dilated left atrium, mildly elevated pulmonary pressure 35 mmHg,  In May 2024 patient had excess for cardioversion.     He  presents today for reassessment.  His PCP lowered her metoprolol from 25 mg twice daily to 12.5 mg twice daily because of his low pulse readings at home.  He started at the gym on Sunday and felt good with restarting routine exercise.  He has no chest pain or shortness of breath or edema or dizziness or palpitation.  He reports some fatigue related to beta-blocker treatment but overall is feeling good.  No blood in the urine or stool.       Past Medical History:  Past Medical History:   Diagnosis Date    Aortic root dilation     Atrial fibrillation     Back problem     Basal cell carcinoma     Cardiomyopathy     EF 45-50 % in 04/2024 on scanned media    Claustrophobia     Hyperlipidemia     Hypertension     Kidney stones        Past Surgical History:   Procedure Laterality Date    APPENDECTOMY      BASAL CELL CARCINOMA EXCISION      COLONOSCOPY      Date unknown    EYE SURGERY      HERNIA REPAIR      KIDNEY STONE SURGERY      KNEE SURGERY Right     SHOULDER SURGERY Right     WISDOM TOOTH EXTRACTION         Home Medications:   Medications Prior to Admission   Medication Sig Dispense Refill Last Dose/Taking    lisinopril-hydrochlorothiazide (PRINZIDE,ZESTORETIC) 20-12.5 MG per tablet Take 1 tablet by mouth Daily.   11/20/2024 Morning    metoprolol tartrate (LOPRESSOR) 25 MG tablet Take 0.5 tablets by mouth 2 (Two) Times a Day.   11/20/2024 Morning    rosuvastatin (CRESTOR) 5 MG tablet Take 1 tablet by mouth Daily. 90 tablet 3 11/20/2024 Morning    TAMSULOSIN HCL PO    11/19/2024    traZODone (DESYREL) 50 MG tablet TAKE ONE TABLET BY MOUTH ONCE NIGHTLY 90 tablet 1 11/19/2024    Eliquis 5 MG tablet tablet Take 1 tablet by mouth Every 12 (Twelve) Hours. 180 tablet 3 11/18/2024       Allergies:  No Known Allergies    Past Social History:  Social History     Socioeconomic History    Marital status:     Number of children: 3   Tobacco Use    Smoking status: Never     Passive exposure: Never    Smokeless tobacco:  Never    Tobacco comments:     Caffeine - 2 cups coffee daily    Vaping Use    Vaping status: Never Used   Substance and Sexual Activity    Alcohol use: Yes     Alcohol/week: 8.0 standard drinks of alcohol     Types: 8 Drinks containing 0.5 oz of alcohol per week    Drug use: No    Sexual activity: Defer     Partners: Female     Birth control/protection: Vasectomy       Past Family History:  Family History   Problem Relation Age of Onset    Hypertension Father     Hyperlipidemia Father         blood clots    Arthritis Father     Clotting disorder Father     Heart attack Father     Heart disease Father         Heart Attack    Breast cancer Sister     Arthritis Mother     Hypertension Brother        Review of Systems:   All systems reviewed. Pertinent positives identified in HPI. All other systems are negative.    Objective:   Temp:  [97.4 °F (36.3 °C)] 97.4 °F (36.3 °C)  Heart Rate:  [61] 61  Resp:  [20] 20  BP: (166)/(66) 166/66  No intake or output data in the 24 hours ending 11/20/24 0801  Body mass index is 25.46 kg/m².      11/20/24  0755   Weight: 87.5 kg (193 lb)     Weight change:     Physical Exam:   General Appearance:    Alert, cooperative, in no acute distress   Head:    Normocephalic, without obvious abnormality, atraumatic   Eyes:            Lids and lashes normal, conjunctivae and sclerae normal, no   icterus, no pallor, corneas clear, PERRLA   Ears:    Ears appear intact with no abnormalities noted   Neck:   No adenopathy, supple, trachea midline, no thyromegaly, no   carotid bruit, no JVD   Lungs:     Clear to auscultation,respirations regular, even and unlabored    Heart:    Regular rhythm and normal rate, normal S1 and S2, no murmur, no gallop, no rub, no click   Chest Wall:    No abnormalities observed   Abdomen:     Normal bowel sounds, no masses, no organomegaly, soft        non-tender, non-distended, no guarding, no rebound  tenderness   Extremities:   Moves all extremities well, no edema, no  cyanosis, no redness   Pulses:   Pulses palpable and equal bilaterally. Normal radial, carotid, femoral, dorsalis pedis and posterior tibial pulses bilaterally. Normal abdominal aorta   Skin:  Psychiatric:   No bleeding, bruising or rash    Alert and oriented x 3, normal mood and affect         Lab Review:   Results from last 7 days   Lab Units 11/18/24  1130   SODIUM mmol/L 139   POTASSIUM mmol/L 3.8   CHLORIDE mmol/L 102   CO2 mmol/L 26.0   BUN mg/dL 17   CREATININE mg/dL 0.92   GLUCOSE mg/dL 125*   CALCIUM mg/dL 9.2         Results from last 7 days   Lab Units 11/18/24  1130   WBC 10*3/mm3 3.67   HEMOGLOBIN g/dL 14.5   HEMATOCRIT % 41.9   PLATELETS 10*3/mm3 202         I personally viewed and interpreted the patient's EKG    Assessment/Plan:     Severe aortic valve regurgitation  Mildly dilated ascending aorta  Paroxsymal atrial fibrillation    - For cardiac catheterization today.    Deisy Keller MD  11/20/24  08:01 EST

## 2024-11-20 NOTE — Clinical Note
Hemostasis started on the right radial artery. R-Band was used in achieving hemostasis. Radial compression device applied to vessel. Hemostasis achieved successfully. Closure device additional comment: TR band applied with 12 cc air.

## 2024-11-20 NOTE — PROGRESS NOTES
Catheter report reviewed.  Patient is to keep follow-up appointment as scheduled with me.  Message sent to Dr. Martinez.

## 2024-11-20 NOTE — DISCHARGE INSTRUCTIONS
[facility]  [address] [place] [address]    Coronary Angiogram (Radial/Ulnar Approach) After Care    Refer to this sheet in the next few weeks. These instructions provide you with information on caring for yourself after your procedure. Your caregiver may also give you more specific instructions. Your treatment has been planned according to current medical practices, but problems sometimes occur. Call your caregiver if you have any problems or questions after your procedure.    Home Care Instructions:  You may shower the day after the procedure. Remove the bandage (dressing) and gently wash the site with plain soap and water. Gently pat the site dry. You may apply a band aid daily for 2 days if desired.    Do not apply powder or lotion to the site.  Do not submerge the affected site in water for 3 to 5 days or until the site is completely healed.   Do not lift, push or pull anything over 5 pounds for 5 days after your procedure or as directed by your physician.  As a reference, a gallon of milk weighs 8 pounds.   Inspect the site at least twice daily. You may notice some bruising at the site and it may be tender for 1 to 2 weeks.     Increase your fluid intake for the next 2 days.    Keep arm elevated for 24 hours. For the remainder of the day, keep your arm in “Pledge of Allegiance” position when up and about.     You may drive 24 hours after the procedure unless otherwise instructed by your caregiver.  Do not operate machinery or power tools for 24 hours.  A responsible adult should be with you for the first 24 hours after you arrive home. Do not make any important legal decisions or sign legal papers for 24 hours.  Do not drink alcohol for 24 hours.    Metformin or any medications containing Metformin should not be taken for 48 hours after your procedure.      Call Your Doctor if:   You have unusual pain at the radial/ulnar (wrist) site.  You have redness, warmth, swelling, or pain at the  radial/ulnar (wrist) site.  You have drainage (other than a small amount of blood on the dressing).  `You have chills or a fever > 101.  Your arm becomes pale or dark, cool, tingly, or numb.  You develop chest pain, shortness of breath, feel faint or pass out.    You have heavy bleeding from the site, hold pressure on the site for 20 minutes.  If the bleeding stops, apply a fresh bandage and call your cardiologist.  However, if you        continue to have bleeding, call 911 and continue to apply pressure to the site.   You have any symptoms of a stroke.  Remember BE FAST  B-balance. Sudden trouble walking or loss of balance.  E-eyes.  Sudden changes in how you see or a sudden onset of a very bad headache.   F-face. Sudden weakness or loss of feeling of the face or facial droop on one side.   A-arms Sudden weakness or numbness in one arm.  One arm drifts down if they are both held out in front of you. This happens suddenly and usually on one side of the body.   S-speech.  Sudden trouble speaking, slurred speech or trouble understanding what are saying.   T-time  Time to call emergency services.  Write down the symptoms and the time they started.

## 2024-11-20 NOTE — TELEPHONE ENCOUNTER
Please inform patient CT shows stable lung nodule and repeat CT is recommended in 6 months to follow this.  He will keep his appointment with me next week to follow-up on his heart cath and I have sent a message to Dr. Martinez to inform him that heart cath has been completed

## 2024-11-20 NOTE — Clinical Note
Catheter inserted with wire simultaneously. TDAP vaccine today    Schedule lab orders for today.     If not contacted in a couple weeks by colonoscopy scheduling department - call Colonoscopy Scheduling Number - [phone].    Information about cholesterol, high blood pressure and healthy diet and activity recommendations can be found at the following links on the Internet:    http://www.nhlbi.nih.gov/health/health-topics/topics/hbc  http://www.nhlbi.nih.gov/health/educational/lose_wt/index.htm  Http://www.nhlbi.nih.gov/files/docs/public/heart/hbp_low.pdf  http://www.heart.org/HEARTORG/  http://diabetes.org/  https://www.cdc.gov/  Https://healthfinder.gov/  https://health.gov/dietaryguidelines/2015/guidelines/  https://health.gov/paguidelines/second-edition/pdf/Physical_Activity_Guidelines_2nd_edition.pdf

## 2024-11-20 NOTE — TELEPHONE ENCOUNTER
Notified patient of results/recommendations. Patient verbalized understanding.    Susan Muñoz RN  Triage [facility]

## 2024-11-21 ENCOUNTER — HOSPITAL ENCOUNTER (OUTPATIENT)
Dept: RESPIRATORY THERAPY | Facility: HOSPITAL | Age: 74
Discharge: HOME | End: 2024-11-21
Payer: MEDICARE

## 2024-11-21 DIAGNOSIS — R91.1 LUNG NODULE: ICD-10-CM

## 2024-11-21 DIAGNOSIS — Z01.818 PREOP TESTING: ICD-10-CM

## 2024-11-21 LAB
ARTERIAL PATENCY WRIST A: ABNORMAL
ATMOSPHERIC PRESS: 745 MMHG
BASE EXCESS BLDA CALC-SCNC: 5.8 MMOL/L (ref 0–2)
BDY SITE: ABNORMAL
CO2 BLDA-SCNC: 30.5 MMOL/L (ref 23–27)
HCO3 BLDA-SCNC: 29.4 MMOL/L (ref 22–28)
HEMODILUTION: NO
MODALITY: ABNORMAL
PCO2 BLDA: 38.1 MM HG (ref 35–45)
PH BLDA: 7.5 PH UNITS (ref 7.35–7.45)
PO2 BLDA: 77 MM HG (ref 80–100)
SAO2 % BLDCOA: 96.3 % (ref 92–98.5)
TOTAL RATE: 18 BREATHS/MINUTE

## 2024-11-21 PROCEDURE — 94729 DIFFUSING CAPACITY: CPT

## 2024-11-21 PROCEDURE — 94726 PLETHYSMOGRAPHY LUNG VOLUMES: CPT

## 2024-11-21 PROCEDURE — 94060 EVALUATION OF WHEEZING: CPT

## 2024-11-21 PROCEDURE — 36600 WITHDRAWAL OF ARTERIAL BLOOD: CPT

## 2024-11-21 PROCEDURE — 94640 AIRWAY INHALATION TREATMENT: CPT

## 2024-11-21 PROCEDURE — 82803 BLOOD GASES ANY COMBINATION: CPT

## 2024-11-21 RX ORDER — ALBUTEROL SULFATE 0.83 MG/ML
2.5 SOLUTION RESPIRATORY (INHALATION) ONCE
Status: COMPLETED | OUTPATIENT
Start: 2024-11-21 | End: 2024-11-21

## 2024-11-21 RX ADMIN — ALBUTEROL SULFATE 2.5 MG: 2.5 SOLUTION RESPIRATORY (INHALATION) at 10:19

## 2024-11-27 ENCOUNTER — OFFICE VISIT (OUTPATIENT)
Dept: CARDIOLOGY | Facility: CLINIC | Age: 74
End: 2024-11-27
Payer: MEDICARE

## 2024-11-27 VITALS
BODY MASS INDEX: 26.11 KG/M2 | WEIGHT: 197 LBS | HEART RATE: 57 BPM | DIASTOLIC BLOOD PRESSURE: 70 MMHG | SYSTOLIC BLOOD PRESSURE: 160 MMHG | HEIGHT: 73 IN

## 2024-11-27 DIAGNOSIS — I77.810 AORTIC ROOT DILATION: ICD-10-CM

## 2024-11-27 DIAGNOSIS — R91.1 NODULE OF LOWER LOBE OF RIGHT LUNG: ICD-10-CM

## 2024-11-27 DIAGNOSIS — I35.1 NONRHEUMATIC AORTIC VALVE INSUFFICIENCY: Primary | ICD-10-CM

## 2024-11-27 NOTE — PROGRESS NOTES
Date of Office Visit: 24  Encounter Provider: KARI Lockhart  Place of Service: [facility] CARDIOLOG[facility]  Patient Name: Roney Kuhn  :1950    Chief Complaint   Patient presents with    Follow-up   :     HPI: Roney Kuhn is a 74 y.o. male  with atrial fibrillation, aortic valve insufficiency, hypertension, hyperlipidemia, cardiomyopathy, aortic root dilation, right lung nodule, and BPH.         He is followed by Dr. Tammy Mi.  I will visit with him in follow-up and have reviewed his medical record.     In April he was on vacation in [facility] and had an aggressive cough.  He went to urgent care and ultimately was admitted to the nearest hospital.  He was treated for pneumonia and told he had A-fib.  He had an echocardiogram which showed ejection fraction of 45-50% and dilated left ventricle, aortic root measuring 4.3 cm, mild mitral valve regurgitation, mild to moderate tricuspid valve regurgitation, mildly dilated left atrium, mildly elevated pulmonary pressure 35 mmHg,  In May 2024 patient had successful external cardioversion.  He then maintained normal sinus rhythm.  He had transesophageal echocardiogram 2024 which showed dilated LV cavity with an EF of 56-60% and severe trileaflet aortic valve regurgitation with poor coaptation between the right and left coronary cusp.  He then was sent to Dr. Martinez and surgical intervention was recommended.  He had cardiac catheterization 2024 which showed 10-20% coronary artery disease.  He had preoperative PFT 2024.  He presents today accompanied by his wife and son.  He overall still feels good and has not exercised since the cath.  No chest pain or shortness of breath or edema or dizziness or palpitation.  He is anxious regarding surgery scheduling date.            No Known Allergies        Family and social history reviewed.     ROS  All other systems were reviewed and are  "negative          Objective:     Vitals:    11/27/24 0926   BP: 160/70   BP Location: Right arm   Patient Position: Sitting   Cuff Size: Adult   Pulse: 57   Weight: 89.4 kg (197 lb)   Height: 185.4 cm (73\")     Body mass index is 25.99 kg/m².    PHYSICAL EXAM:  Pulmonary:      Effort: Pulmonary effort is normal.      Breath sounds: Normal breath sounds.   Cardiovascular:      Normal rate. Regular rhythm.      Murmurs: There is a systolic murmur.           ECG 12 Lead    Date/Time: 11/27/2024 9:36 AM  Performed by: Liss Yanez APRN    Authorized by: Liss Yanez APRN  Comparison: compared with previous ECG   Similar to previous ECG  Rhythm: sinus rhythm  Rate: normal  QRS axis: normal  Other findings: left ventricular hypertrophy            Current Outpatient Medications   Medication Sig Dispense Refill    Eliquis 5 MG tablet tablet Take 1 tablet by mouth Every 12 (Twelve) Hours. 180 tablet 3    lisinopril-hydrochlorothiazide (PRINZIDE,ZESTORETIC) 20-12.5 MG per tablet Take 1 tablet by mouth Daily.      metoprolol tartrate (LOPRESSOR) 25 MG tablet Take 0.5 tablets by mouth 2 (Two) Times a Day.      rosuvastatin (CRESTOR) 5 MG tablet Take 1 tablet by mouth Daily. 90 tablet 3    TAMSULOSIN HCL PO       traZODone (DESYREL) 50 MG tablet TAKE ONE TABLET BY MOUTH ONCE NIGHTLY 90 tablet 1     No current facility-administered medications for this visit.     Assessment:       Diagnosis Plan   1. Nonrheumatic aortic valve insufficiency  ECG 12 Lead      2. Aortic root dilation  ECG 12 Lead      3. Nodule of lower lobe of right lung  ECG 12 Lead           Orders Placed This Encounter   Procedures    ECG 12 Lead     This order was created via procedure documentation     Order Specific Question:   Release to patient     Answer:   Routine Release [[id number]]         Plan:       1.  74-year-old gentleman with atrial fibrillation status post cardioversion May 2024.  Appears to be maintaining normal sinus rhythm.  He will " continue metoprolol tartrate 12.5 mg twice daily and Eliquis 5 mg twice daily  2.  Hyperlipidemia rosuvastatin 5 mg  3.  Hypertension-blood pressure little elevated today but normally better and he is anxious.  4.  Severe aortic valve regurgitation on MICHELLE September 2024 with mildly dilated aorta at 4.3 cm on CTA chest September 2024.  Following with Dr. Prasanth Martinez.  Surgical intervention has been recommended.  Preoperative cardiac catheterization completed 11/20/2024 shows nonobstructive, minimal coronary artery disease.  PFT complete 11/21/2024.  I called Rochelle who help scheduled for Dr. Martinez and she states she plans to call patient next week.  Patient was updated.  5.  Mild, nonobstructive coronary artery disease with a 10-20% stenosis on cath November 2024  6.BPH on tamsulosin  7.  Tricuspid valve insufficiency-as listed above  8.  Aortic Root dilation at 4.3 cm on CTA September 2024  9. 9-10 mm right lower lobe nodule September 2024 and stable on repeat November 18, 2024.  6-month reassessment CT has been ordered        I will monitor his chart for surgery date and help to ensure appropriate postop follow-up with me.          It has been a pleasure to participate in this patient's care.      Thank you,  KARI Lockhart      **I used Dragon to dictate this note:**

## 2024-12-03 ENCOUNTER — TELEPHONE (OUTPATIENT)
Dept: CARDIAC SURGERY | Facility: CLINIC | Age: 74
End: 2024-12-03
Payer: MEDICARE

## 2024-12-09 ENCOUNTER — TELEPHONE (OUTPATIENT)
Dept: CARDIAC SURGERY | Facility: CLINIC | Age: 74
End: 2024-12-09
Payer: MEDICARE

## 2024-12-09 NOTE — TELEPHONE ENCOUNTER
Spoke with pt surgery is scheduled for 02/05/2025. Will reach out to pt closer to date of surgery to give PAT times. PT is agreeable

## 2024-12-09 NOTE — TELEPHONE ENCOUNTER
Eliza- is this any way that you can help ensure this gentleman gets called by Dr. Martinez's surgery scheduler?

## 2025-01-08 ENCOUNTER — TELEPHONE (OUTPATIENT)
Dept: CARDIAC SURGERY | Facility: CLINIC | Age: 75
End: 2025-01-08
Payer: MEDICARE

## 2025-01-08 NOTE — TELEPHONE ENCOUNTER
"  Caller: Roney Kuhn \"MARIANELA\"    Relationship: Self    Best call back number: [phone]    What is the best time to reach you: anytime    Who are you requesting to speak with (clinical staff, provider,  specific staff member): clinical    Do you know the name of the person who called: PT'S PRIMARY DOCTOR    What was the call regarding: PRIMARY WAS CALLING BECAUSE PT HAD A LOT OF QUESTIONS ABOUT HIS UPCOMING SURGERY THAT THEY COULD NOT ANSWER. PLEASE CALL THE PT TO DISCUSS HIS QUESTIONS AND CONCERNS THANK YOU    Is it okay if the provider responds through MyChart: NO      "

## 2025-01-09 ENCOUNTER — PREP FOR SURGERY (OUTPATIENT)
Dept: OTHER | Facility: HOSPITAL | Age: 75
End: 2025-01-09
Payer: MEDICARE

## 2025-01-09 DIAGNOSIS — I79.8 OTHER DISORDERS OF ARTERIES, ARTERIOLES AND CAPILLARIES IN DISEASES CLASSIFIED ELSEWHERE: ICD-10-CM

## 2025-01-09 DIAGNOSIS — I11.0 HYPERTENSIVE HEART DISEASE WITH HEART FAILURE: ICD-10-CM

## 2025-01-09 DIAGNOSIS — R79.9 ABNORMAL FINDING OF BLOOD CHEMISTRY, UNSPECIFIED: ICD-10-CM

## 2025-01-09 DIAGNOSIS — I48.19 PERSISTENT ATRIAL FIBRILLATION: Primary | ICD-10-CM

## 2025-01-09 RX ORDER — CHLORHEXIDINE GLUCONATE 500 MG/1
1 CLOTH TOPICAL EVERY 12 HOURS PRN
OUTPATIENT
Start: 2025-01-09

## 2025-01-09 RX ORDER — METOPROLOL TARTRATE 25 MG/1
12.5 TABLET, FILM COATED ORAL
OUTPATIENT
Start: 2025-01-10 | End: 2025-01-11

## 2025-01-09 RX ORDER — CHLORHEXIDINE GLUCONATE ORAL RINSE 1.2 MG/ML
15 SOLUTION DENTAL ONCE
OUTPATIENT
Start: 2025-01-09 | End: 2025-01-09

## 2025-01-09 RX ORDER — CHLORHEXIDINE GLUCONATE ORAL RINSE 1.2 MG/ML
15 SOLUTION DENTAL EVERY 12 HOURS
OUTPATIENT
Start: 2025-01-09 | End: 2025-01-10

## 2025-01-09 NOTE — TELEPHONE ENCOUNTER
Spoke with , discussed patient questions in regards to surgery. Advised he would be contacted by surgery scheduler with date/time for PAT. Pt will also be contacted with instructions for Eliquis closer to surgery.

## 2025-01-20 ENCOUNTER — TELEPHONE (OUTPATIENT)
Dept: CARDIAC SURGERY | Facility: CLINIC | Age: 75
End: 2025-01-20
Payer: MEDICARE

## 2025-01-20 NOTE — TELEPHONE ENCOUNTER
Spoke with pt surgery is scheduled for 02/05/2025 arriving at 6am. PAT on 02/03/2025 7330am. Advised pt that RN would be calling with Eliquis instructions.

## 2025-01-21 ENCOUNTER — TELEPHONE (OUTPATIENT)
Dept: CARDIAC SURGERY | Facility: CLINIC | Age: 75
End: 2025-01-21
Payer: MEDICARE

## 2025-01-21 NOTE — TELEPHONE ENCOUNTER
Notified patient that his last dose of eliquis before surgery with Dr. Martinez on 2/5/25 will be on 1/29/25. No lovenox needed per Dr. Mercado as patient has been maintaining sinus rhythm. Patient verbalized understanding and agreed to plan of care.

## 2025-01-28 ENCOUNTER — TELEPHONE (OUTPATIENT)
Dept: CARDIOLOGY | Facility: CLINIC | Age: 75
End: 2025-01-28
Payer: MEDICARE

## 2025-01-28 NOTE — TELEPHONE ENCOUNTER
"Patient called and left a message asking if you will call him to discuss his upcoming surgical procedure with Dr. Martinez. Such as \"what is a Maze Procedure?\" / ANTHONY     Patient can be reached at [phone]  "

## 2025-01-28 NOTE — TELEPHONE ENCOUNTER
I spoke with patient.  Answered his questions regarding preoperative testing and maze procedure.  He was appreciative of my call   No

## 2025-02-03 ENCOUNTER — HOSPITAL ENCOUNTER (OUTPATIENT)
Dept: GENERAL RADIOLOGY | Facility: HOSPITAL | Age: 75
Discharge: HOME | End: 2025-02-03
Payer: MEDICARE

## 2025-02-03 ENCOUNTER — PRE-ADMISSION TESTING (OUTPATIENT)
Dept: PREADMISSION TESTING | Facility: HOSPITAL | Age: 75
DRG: 220 | End: 2025-02-03
Payer: MEDICARE

## 2025-02-03 ENCOUNTER — APPOINTMENT (OUTPATIENT)
Dept: CARDIOLOGY | Facility: HOSPITAL | Age: 75
DRG: 220 | End: 2025-02-03
Payer: MEDICARE

## 2025-02-03 ENCOUNTER — HOSPITAL ENCOUNTER (OUTPATIENT)
Facility: HOSPITAL | Age: 75
Discharge: HOME | End: 2025-02-03
Payer: MEDICARE

## 2025-02-03 VITALS
WEIGHT: 197.2 LBS | OXYGEN SATURATION: 97 % | HEART RATE: 60 BPM | RESPIRATION RATE: 16 BRPM | BODY MASS INDEX: 26.14 KG/M2 | DIASTOLIC BLOOD PRESSURE: 73 MMHG | SYSTOLIC BLOOD PRESSURE: 189 MMHG | HEIGHT: 73 IN | TEMPERATURE: 97.8 F

## 2025-02-03 VITALS — SYSTOLIC BLOOD PRESSURE: 128 MMHG | DIASTOLIC BLOOD PRESSURE: 62 MMHG

## 2025-02-03 DIAGNOSIS — I48.19 PERSISTENT ATRIAL FIBRILLATION: ICD-10-CM

## 2025-02-03 DIAGNOSIS — R79.9 ABNORMAL FINDING OF BLOOD CHEMISTRY, UNSPECIFIED: ICD-10-CM

## 2025-02-03 DIAGNOSIS — I11.0 HYPERTENSIVE HEART DISEASE WITH HEART FAILURE: ICD-10-CM

## 2025-02-03 DIAGNOSIS — I79.8 OTHER DISORDERS OF ARTERIES, ARTERIOLES AND CAPILLARIES IN DISEASES CLASSIFIED ELSEWHERE: ICD-10-CM

## 2025-02-03 LAB
ABO GROUP BLD: NORMAL
ALBUMIN SERPL-MCNC: 4.1 G/DL (ref 3.5–5.2)
ALBUMIN/GLOB SERPL: 1.7 G/DL
ALP SERPL-CCNC: 57 U/L (ref 39–117)
ALT SERPL W P-5'-P-CCNC: 13 U/L (ref 1–41)
ANION GAP SERPL CALCULATED.3IONS-SCNC: 10.4 MMOL/L (ref 5–15)
APTT PPP: 27.8 SECONDS (ref 22.7–35.4)
ARTERIAL PATENCY WRIST A: POSITIVE
AST SERPL-CCNC: 19 U/L (ref 1–40)
ATMOSPHERIC PRESS: 749.9 MMHG
BASE EXCESS BLDA CALC-SCNC: 0.8 MMOL/L (ref 0–2)
BASOPHILS # BLD AUTO: 0.02 10*3/MM3 (ref 0–0.2)
BASOPHILS NFR BLD AUTO: 0.6 % (ref 0–1.5)
BDY SITE: NORMAL
BH CV XLRA MEAS LEFT CAROTID BULB EDV: -8.6 CM/SEC
BH CV XLRA MEAS LEFT CAROTID BULB PSV: -66 CM/SEC
BH CV XLRA MEAS LEFT DIST CCA EDV: -13.8 CM/SEC
BH CV XLRA MEAS LEFT DIST CCA PSV: -140.4 CM/SEC
BH CV XLRA MEAS LEFT DIST ICA EDV: -21.2 CM/SEC
BH CV XLRA MEAS LEFT DIST ICA PSV: -117.9 CM/SEC
BH CV XLRA MEAS LEFT ICA/CCA RATIO: 0.84
BH CV XLRA MEAS LEFT MID CCA EDV: 15.7 CM/SEC
BH CV XLRA MEAS LEFT MID CCA PSV: 140.4 CM/SEC
BH CV XLRA MEAS LEFT MID ICA EDV: -25.1 CM/SEC
BH CV XLRA MEAS LEFT MID ICA PSV: -99 CM/SEC
BH CV XLRA MEAS LEFT PROX CCA EDV: 15.7 CM/SEC
BH CV XLRA MEAS LEFT PROX CCA PSV: 156.2 CM/SEC
BH CV XLRA MEAS LEFT PROX ECA PSV: -120.8 CM/SEC
BH CV XLRA MEAS LEFT PROX ICA EDV: -13.4 CM/SEC
BH CV XLRA MEAS LEFT PROX ICA PSV: -80.9 CM/SEC
BH CV XLRA MEAS LEFT PROX SCLA PSV: 213.4 CM/SEC
BH CV XLRA MEAS LEFT VERTEBRAL A EDV: 11.8 CM/SEC
BH CV XLRA MEAS LEFT VERTEBRAL A PSV: 86.4 CM/SEC
BH CV XLRA MEAS RIGHT CAROTID BULB EDV: -13.8 CM/SEC
BH CV XLRA MEAS RIGHT CAROTID BULB PSV: -118.8 CM/SEC
BH CV XLRA MEAS RIGHT DIST CCA EDV: -12.8 CM/SEC
BH CV XLRA MEAS RIGHT DIST CCA PSV: -128.7 CM/SEC
BH CV XLRA MEAS RIGHT DIST ICA EDV: -16.5 CM/SEC
BH CV XLRA MEAS RIGHT DIST ICA PSV: -92.7 CM/SEC
BH CV XLRA MEAS RIGHT ICA/CCA RATIO: 1.02
BH CV XLRA MEAS RIGHT MID CCA EDV: -13.8 CM/SEC
BH CV XLRA MEAS RIGHT MID CCA PSV: -142.4 CM/SEC
BH CV XLRA MEAS RIGHT MID ICA EDV: -12.6 CM/SEC
BH CV XLRA MEAS RIGHT MID ICA PSV: -99.8 CM/SEC
BH CV XLRA MEAS RIGHT PROX CCA EDV: 8.8 CM/SEC
BH CV XLRA MEAS RIGHT PROX CCA PSV: 148.3 CM/SEC
BH CV XLRA MEAS RIGHT PROX ECA EDV: -2.9 CM/SEC
BH CV XLRA MEAS RIGHT PROX ECA PSV: -144.4 CM/SEC
BH CV XLRA MEAS RIGHT PROX ICA EDV: -14.7 CM/SEC
BH CV XLRA MEAS RIGHT PROX ICA PSV: -131.6 CM/SEC
BH CV XLRA MEAS RIGHT PROX SCLA PSV: 212 CM/SEC
BH CV XLRA MEAS RIGHT VERTEBRAL A EDV: 8.8 CM/SEC
BH CV XLRA MEAS RIGHT VERTEBRAL A PSV: 89.4 CM/SEC
BILIRUB SERPL-MCNC: 0.3 MG/DL (ref 0–1.2)
BILIRUB UR QL STRIP: NEGATIVE
BLD GP AB SCN SERPL QL: NEGATIVE
BUN BLDA-MCNC: NORMAL MG/DL
BUN SERPL-MCNC: 20 MG/DL (ref 8–23)
BUN/CREAT SERPL: 21.5 (ref 7–25)
CA-I BLDA-SCNC: NORMAL MMOL/L
CALCIUM SPEC-SCNC: 9.2 MG/DL (ref 8.6–10.5)
CHLORIDE BLDA-SCNC: NORMAL MMOL/L
CHLORIDE SERPL-SCNC: 100 MMOL/L (ref 98–107)
CHOLEST SERPL-MCNC: 176 MG/DL (ref 0–200)
CLARITY UR: CLEAR
CLOSE TME COLL+ADP + EPINEP PNL BLD: 97 % (ref 86–100)
CO2 BLDA-SCNC: NORMAL MMOL/L
CO2 SERPL-SCNC: 26.6 MMOL/L (ref 22–29)
COLOR UR: YELLOW
CREAT BLDA-MCNC: NORMAL MG/DL
CREAT SERPL-MCNC: 0.93 MG/DL (ref 0.76–1.27)
D-LACTATE SERPL-SCNC: NORMAL MMOL/L
DEPRECATED RDW RBC AUTO: 42.1 FL (ref 37–54)
EGFRCR SERPLBLD CKD-EPI 2021: 84.6 ML/MIN/1.73
EOSINOPHIL # BLD AUTO: 0.21 10*3/MM3 (ref 0–0.4)
EOSINOPHIL NFR BLD AUTO: 6 % (ref 0.3–6.2)
ERYTHROCYTE [DISTWIDTH] IN BLOOD BY AUTOMATED COUNT: 12.8 % (ref 12.3–15.4)
GLOBULIN UR ELPH-MCNC: 2.4 GM/DL
GLUCOSE BLDC GLUCOMTR-MCNC: NORMAL MG/DL
GLUCOSE SERPL-MCNC: 97 MG/DL (ref 65–99)
GLUCOSE UR STRIP-MCNC: NEGATIVE MG/DL
HBA1C MFR BLD: 5.2 % (ref 4.8–5.6)
HCO3 BLDA-SCNC: 25.5 MMOL/L (ref 22–28)
HCT VFR BLD AUTO: 42.8 % (ref 37.5–51)
HDLC SERPL-MCNC: 87 MG/DL (ref 40–60)
HEMODILUTION: NO
HGB BLD-MCNC: 15 G/DL (ref 13–17.7)
HGB UR QL STRIP.AUTO: NEGATIVE
IMM GRANULOCYTES # BLD AUTO: 0.01 10*3/MM3 (ref 0–0.05)
IMM GRANULOCYTES NFR BLD AUTO: 0.3 % (ref 0–0.5)
INR PPP: 0.92 (ref 0.9–1.1)
KETONES UR QL STRIP: NEGATIVE
LDLC SERPL CALC-MCNC: 76 MG/DL (ref 0–100)
LDLC/HDLC SERPL: 0.86 {RATIO}
LEFT ARM BP: NORMAL MMHG
LEUKOCYTE ESTERASE UR QL STRIP.AUTO: NEGATIVE
LYMPHOCYTES # BLD AUTO: 1.11 10*3/MM3 (ref 0.7–3.1)
LYMPHOCYTES NFR BLD AUTO: 31.8 % (ref 19.6–45.3)
MAGNESIUM SERPL-MCNC: 2.1 MG/DL (ref 1.6–2.4)
MCH RBC QN AUTO: 32.1 PG (ref 26.6–33)
MCHC RBC AUTO-ENTMCNC: 35 G/DL (ref 31.5–35.7)
MCV RBC AUTO: 91.5 FL (ref 79–97)
MODALITY: NORMAL
MONOCYTES # BLD AUTO: 0.6 10*3/MM3 (ref 0.1–0.9)
MONOCYTES NFR BLD AUTO: 17.2 % (ref 5–12)
NEUTROPHILS NFR BLD AUTO: 1.54 10*3/MM3 (ref 1.7–7)
NEUTROPHILS NFR BLD AUTO: 44.1 % (ref 42.7–76)
NITRITE UR QL STRIP: NEGATIVE
NRBC BLD AUTO-RTO: 0 /100 WBC (ref 0–0.2)
NT-PROBNP SERPL-MCNC: 524 PG/ML (ref 0–1800)
PCO2 BLDA: 40.2 MM HG (ref 35–45)
PH BLDA: 7.41 PH UNITS (ref 7.35–7.45)
PH UR STRIP.AUTO: 6 [PH] (ref 5–8)
PLATELET # BLD AUTO: 170 10*3/MM3 (ref 140–450)
PMV BLD AUTO: 9.6 FL (ref 6–12)
PO2 BLDA: 86.3 MM HG (ref 80–100)
POTASSIUM BLDA-SCNC: NORMAL MMOL/L
POTASSIUM SERPL-SCNC: 3.7 MMOL/L (ref 3.5–5.2)
PROT SERPL-MCNC: 6.5 G/DL (ref 6–8.5)
PROT UR QL STRIP: NEGATIVE
PROTHROMBIN TIME: 12.5 SECONDS (ref 11.7–14.2)
QT INTERVAL: 432 MS
QTC INTERVAL: 417 MS
RBC # BLD AUTO: 4.68 10*6/MM3 (ref 4.14–5.8)
RH BLD: POSITIVE
RIGHT ARM BP: NORMAL MMHG
SAO2 % BLDCOA: 96.6 % (ref 92–98.5)
SODIUM BLD-SCNC: NORMAL MMOL/L
SODIUM SERPL-SCNC: 137 MMOL/L (ref 136–145)
SP GR UR STRIP: 1.02 (ref 1–1.03)
T&S EXPIRATION DATE: NORMAL
TOTAL RATE: 18 BREATHS/MINUTE
TRIGL SERPL-MCNC: 72 MG/DL (ref 0–150)
UROBILINOGEN UR QL STRIP: NORMAL
VLDLC SERPL-MCNC: 13 MG/DL (ref 5–40)
WBC NRBC COR # BLD AUTO: 3.49 10*3/MM3 (ref 3.4–10.8)

## 2025-02-03 PROCEDURE — 85610 PROTHROMBIN TIME: CPT

## 2025-02-03 PROCEDURE — 86901 BLOOD TYPING SEROLOGIC RH(D): CPT

## 2025-02-03 PROCEDURE — 86900 BLOOD TYPING SEROLOGIC ABO: CPT

## 2025-02-03 PROCEDURE — 83735 ASSAY OF MAGNESIUM: CPT

## 2025-02-03 PROCEDURE — 83880 ASSAY OF NATRIURETIC PEPTIDE: CPT

## 2025-02-03 PROCEDURE — 80061 LIPID PANEL: CPT

## 2025-02-03 PROCEDURE — 93010 ELECTROCARDIOGRAM REPORT: CPT | Performed by: INTERNAL MEDICINE

## 2025-02-03 PROCEDURE — 93005 ELECTROCARDIOGRAM TRACING: CPT

## 2025-02-03 PROCEDURE — 83036 HEMOGLOBIN GLYCOSYLATED A1C: CPT

## 2025-02-03 PROCEDURE — S0260 H&P FOR SURGERY: HCPCS | Performed by: THORACIC SURGERY (CARDIOTHORACIC VASCULAR SURGERY)

## 2025-02-03 PROCEDURE — 85025 COMPLETE CBC W/AUTO DIFF WBC: CPT

## 2025-02-03 PROCEDURE — 71046 X-RAY EXAM CHEST 2 VIEWS: CPT

## 2025-02-03 PROCEDURE — 80053 COMPREHEN METABOLIC PANEL: CPT

## 2025-02-03 PROCEDURE — 36600 WITHDRAWAL OF ARTERIAL BLOOD: CPT | Performed by: THORACIC SURGERY (CARDIOTHORACIC VASCULAR SURGERY)

## 2025-02-03 PROCEDURE — 85730 THROMBOPLASTIN TIME PARTIAL: CPT

## 2025-02-03 PROCEDURE — 93880 EXTRACRANIAL BILAT STUDY: CPT

## 2025-02-03 PROCEDURE — 86923 COMPATIBILITY TEST ELECTRIC: CPT

## 2025-02-03 PROCEDURE — 81003 URINALYSIS AUTO W/O SCOPE: CPT

## 2025-02-03 PROCEDURE — 85576 BLOOD PLATELET AGGREGATION: CPT

## 2025-02-03 PROCEDURE — 86850 RBC ANTIBODY SCREEN: CPT

## 2025-02-03 PROCEDURE — 93880 EXTRACRANIAL BILAT STUDY: CPT | Performed by: SURGERY

## 2025-02-03 PROCEDURE — 82803 BLOOD GASES ANY COMBINATION: CPT | Performed by: THORACIC SURGERY (CARDIOTHORACIC VASCULAR SURGERY)

## 2025-02-03 PROCEDURE — 36415 COLL VENOUS BLD VENIPUNCTURE: CPT

## 2025-02-03 RX ORDER — CHLORHEXIDINE GLUCONATE 500 MG/1
1 CLOTH TOPICAL EVERY 12 HOURS PRN
Status: ACTIVE | OUTPATIENT
Start: 2025-02-03

## 2025-02-03 RX ORDER — CHLORHEXIDINE GLUCONATE ORAL RINSE 1.2 MG/ML
15 SOLUTION DENTAL EVERY 12 HOURS
Status: DISPENSED | OUTPATIENT
Start: 2025-02-03 | End: 2025-02-04

## 2025-02-03 NOTE — DISCHARGE INSTRUCTIONS
Take the following medications the morning of surgery with a small sip of water:      If you are on prescription narcotic pain medication to control your pain you may also take that medication the morning of surgery.    General Instructions:  Do not eat or drink anything after midnight the night before surgery.  Patients who avoid smoking, chewing tobacco and alcohol for 4 weeks prior to surgery have a reduced risk of post-operative complications.    Do not smoke, use chewing tobacco or drink alcohol the day of surgery.   Bring any papers given to you in the doctor’s office.  Wear clean comfortable clothes.  Do not wear contact lenses, false eyelashes or make-up.  Bring a case for your glasses.   Remove all piercings.  Leave jewelry and any other valuables at home.  The Pre-Admission Testing nurse will instruct you to bring medications if unable to obtain an accurate list in Pre-Admission Testing.        If you were given a blood bank ID arm band remember to bring it with you the day of surgery.    Preventing a Surgical Site Infection:  For 2 to 3 days before surgery, avoid shaving with a razor because the razor can irritate skin and make it easier to develop an infection.    Any areas of open skin can increase the risk of a post-operative wound infection by allowing bacteria to enter and travel throughout the body.  Notify your surgeon if you have any skin wounds / rashes even if it is not near the expected surgical site.  The area will need assessed to determine if surgery should be delayed until it is healed.  The night prior to surgery shower using a fresh bar of anti-bacterial soap (such as Dial) and clean washcloth.  Sleep in a clean bed with clean clothing.  Do not allow pets to sleep with you.  Shower on the morning of surgery using a fresh bar of anti-bacterial soap (such as Dial) and clean washcloth.  Dry with a clean towel and dress in clean clothing.  Ask your surgeon if you will be receiving antibiotics  prior to surgery.  Make sure you, your family, and all healthcare providers clean their hands with soap and water or an alcohol based hand  before caring for you or your wound.    Day of surgery:  Your arrival time is approximately two hours before your scheduled surgery time.  Please note if you have an early arrival time the surgery doors do not open before 5:00 AM.  Upon arrival, a Pre-op nurse and Anesthesiologist will review your health history, obtain vital signs, and answer questions you may have.  The only belongings needed at this time will be your home medications and if applicable your C-PAP/BI-PAP machine.  A Pre-op nurse will start an IV and you may receive medication in preparation for surgery, including something to help you relax.      Please be aware that surgery does come with discomfort.  We want to make every effort to control your discomfort so please discuss any uncontrolled symptoms with your nurse.   Your doctor will most likely have prescribed pain medications.          If you are staying overnight following surgery, you will be transported to your hospital room following the recovery period.  [facility] has all private rooms.    If you have any questions please call Pre-Admission Testing at [phone].  Deductibles and co-payments are collected on the day of service. Please be prepared to pay the required co-pay, deductible or deposit on the day of service as defined by your plan.    Call your surgeon immediately if you experience any of the following symptoms:  Sore Throat  Shortness of Breath or difficulty breathing  Cough  Chills  Body soreness or muscle pain  Headache  Fever  New loss of taste or smell  Do not arrive for your surgery ill.  Your procedure will need to be rescheduled to another time.  You will need to call your physician before the day of surgery to avoid any unnecessary exposure to hospital staff as well as other patients.        CHLORHEXIDINE  CLOTH INSTRUCTIONS      The morning of surgery follow these instructions using the Chlorhexidine cloths you've been given.  These steps reduce bacteria on the body.  Do not use the cloths near your eyes, ears mouth, genitalia or on open wounds.  Throw the cloths away after use but do not try to flush them down a toilet.      Open and remove one cloth at a time from the package.    Leave the cloth unfolded and begin the bathing.  Massage the skin with the cloths using gentle pressure to remove bacteria.  Do not scrub harshly.   Follow the steps below with one 2% CHG cloth per area (6 total cloths).  One cloth for neck, shoulders and chest.  One cloth for both arms, hands, fingers and underarms (do underarms last).  One cloth for the abdomen followed by groin.  One cloth for right leg and foot including between the toes.  One cloth for left leg and foot including between the toes.  The last cloth is to be used for the back of the neck, back and buttocks.    Allow the CHG to air dry 3 minutes on the skin which will give it time to work and decrease the chance of irritation.  The skin may feel sticky until it is dry.  Do not rinse with water or any other liquid or you will lose the beneficial effects of the CHG.  If mild skin irritation occurs, do rinse the skin to remove the CHG.  Report this to the nurse at time of admission.  Do not apply lotions, creams, ointments, deodorants or perfumes after using the clothes. Dress in clean clothes before coming to the hospital.    BACTROBAN NASAL OINTMENT  There are many germs normally in your nose. Bactroban is an ointment that will help reduce these germs. Please follow these instructions for Bactroban use:          _1___The day before surgery in the evening              [id number]___[id number]_____    __2__The day of surgery in the morning    [id number]__[id number]______    **Squirt ½ package of Bactroban Ointment onto a cotton applicator and apply to inside of 1st nostril.  Squirt the  remaining Bactroban and apply to the inside of the other nostril.    PERIDEX- ORAL:  Use only if your surgeon has ordered  Use the night before and morning of surgery - Swish, gargle, and spit - do not swallow.

## 2025-02-03 NOTE — H&P
Name: Roney Kuhn ADMIT: (Not on file)   : 1950  PCP: Lizz Wiggins MD    MRN: [id number] LOS: 0 days   AGE/SEX: 75 y.o. male  ROOM: Room/bed info not found     Chief Complaint:  Aortic stenosis    Subjective   History of Present Illness  Patient is a 75 y.o. male with a past medical history including atrial fibrillation on eliquis, aortic root dilation, aortic valve insufficiency, hypertension and hyperlipidemia who was seen for surgical evaluation by Dr. Martinez in 2024, I am meeting him for the first time as he presents for preadmission testing.  At the time he was seen by Dr. Martinez surgery was recommended.      Past Medical History:   Diagnosis Date    Aortic root dilation     Aortic valve insufficiency     Arthritis     Atrial fibrillation     Back problem     Basal cell carcinoma     FACE AND LT EAR    Cardiomyopathy     EF 45-50 % in 2024 on scanned media    Claustrophobia     Hyperlipidemia     Hypertension     Kidney stones      Past Surgical History:   Procedure Laterality Date    APPENDECTOMY      BASAL CELL CARCINOMA EXCISION      REMOVED FROM FACE   LOCAL    CARDIAC CATHETERIZATION N/A 2024    Procedure: Left Heart Cath;  Surgeon: Deisy Keller MD;  Location:  [facility];  Service: Cardiovascular;  Laterality: N/A;  preop- aortic valve surgery- rec from Dr. Martinez    CARDIAC CATHETERIZATION N/A 2024    Procedure: Coronary angiography;  Surgeon: Deisy Keller MD;  Location:  [facility];  Service: Cardiovascular;  Laterality: N/A;    CATARACT EXTRACTION W/ INTRAOCULAR LENS IMPLANT Bilateral     COLONOSCOPY  2017    Date unknown    INGUINAL HERNIA REPAIR Bilateral     KIDNEY STONE SURGERY      KNEE ARTHROSCOPY W/ MENISCAL REPAIR Right 2014    ROTATOR CUFF REPAIR Right 2009    WISDOM TOOTH EXTRACTION       Family History   Problem Relation Age of Onset    Arthritis Mother     Hypertension Father      Hyperlipidemia Father         blood clots    Arthritis Father     Clotting disorder Father     Heart attack Father     Heart disease Father         Heart Attack    Breast cancer Sister     Hypertension Brother     Malig Hyperthermia Neg Hx      Social History     Tobacco Use    Smoking status: Never     Passive exposure: Never    Smokeless tobacco: Never    Tobacco comments:     Caffeine - 2 cups coffee daily    Vaping Use    Vaping status: Never Used   Substance Use Topics    Alcohol use: Yes     Alcohol/week: 8.0 standard drinks of alcohol     Types: 8 Drinks containing 0.5 oz of alcohol per week     Comment: weekly    Drug use: No     No medications prior to admission.     Allergies:  Patient has no known allergies.    Review of Systems   Constitutional:  Positive for activity change and fatigue.   Respiratory:  Positive for shortness of breath.         Objective    Vital Signs  Temp:  [97.8 °F (36.6 °C)] 97.8 °F (36.6 °C)  Heart Rate:  [60] 60  Resp:  [16] 16  BP: (189)/(73) 189/73  SpO2:  [97 %] 97 %  on   ;   Device (Oxygen Therapy): room air  There is no height or weight on file to calculate BMI.    Physical Exam  Constitutional:       Appearance: Normal appearance. He is normal weight.   HENT:      Head: Normocephalic and atraumatic.   Eyes:      General: No scleral icterus.     Pupils: Pupils are equal, round, and reactive to light.   Cardiovascular:      Heart sounds: Murmur heard.   Pulmonary:      Effort: Pulmonary effort is normal.      Breath sounds: Normal breath sounds.   Neurological:      Mental Status: He is alert.         Results Review:   I reviewed the patient's new clinical results.    WBC WBC   Date Value Ref Range Status   02/03/2025 3.49 3.40 - 10.80 10*3/mm3 Final      HGB Hemoglobin   Date Value Ref Range Status   02/03/2025 15.0 13.0 - 17.7 g/dL Final      HCT Hematocrit   Date Value Ref Range Status   02/03/2025 42.8 37.5 - 51.0 % Final      Platelets Platelets   Date Value Ref Range  "Status   02/03/2025 170 140 - 450 10*3/mm3 Final        PT/INR:    Protime   Date Value Ref Range Status   02/03/2025 12.5 11.7 - 14.2 Seconds Final   /  INR   Date Value Ref Range Status   02/03/2025 0.92 0.90 - 1.10 Final       Sodium Sodium   Date Value Ref Range Status   02/03/2025 137 136 - 145 mmol/L Final      Potassium Potassium   Date Value Ref Range Status   02/03/2025 3.7 3.5 - 5.2 mmol/L Final      Chloride Chloride   Date Value Ref Range Status   02/03/2025 100 98 - 107 mmol/L Final      Bicarbonate CO2   Date Value Ref Range Status   02/03/2025 26.6 22.0 - 29.0 mmol/L Final      BUN BUN   Date Value Ref Range Status   02/03/2025 20 8 - 23 mg/dL Final      Creatinine Creatinine   Date Value Ref Range Status   02/03/2025   Corrected     Comment:     Removed from EpIC.  Test not ordered  Nilam Ricketts, RRT, CPFT  2/3/2025  08:27 EST  Corrected result. Previous result was 0.83 mg/dL on 2/3/2025 at 0816 EST.   02/03/2025 0.93 0.76 - 1.27 mg/dL Final      Calcium Calcium   Date Value Ref Range Status   02/03/2025 9.2 8.6 - 10.5 mg/dL Final      Magnesium Magnesium   Date Value Ref Range Status   02/03/2025 2.1 1.6 - 2.4 mg/dL Final          Assessment & Plan       Persistent atrial fibrillation      Assessment & Plan    -Aortic valve insuffiencey  -atrial fibrillation on eliquis - last dose 1/29  -Hypertension  -Hyperlipidemia    Per Dr. Martinez's note \"I recommend aortic valve versus root replacement to prolong survival, symptomatic relief and to prevent adverse events. Discussed the risk (STS calculated), benefits and terms of surgery and he will decide and tell us what he wants to proceed. He will need to have a cardiac cath, pulmonary function test and other routine preoperative studies before surgery. Given his age I recommend a biologic prosthesis versus aortic root plasty versus root replacement.\"    He has broken his left foot since he last saw Dr. Martinez but there has been no other issues or changes " in his health history since he last saw Dr. Martinez in the office.  All preoperative labs wnl, carotid pending,  and cxr without acute process.  I spent a significant amount of time discussing the expected postoperative course and answering any questions the patient and family may have had.  He is scheduled for surgery with Dr. Martinez on Wednesday.          KARI Levy  02/03/25  09:14 EST

## 2025-02-05 ENCOUNTER — ANESTHESIA (OUTPATIENT)
Dept: PERIOP | Facility: HOSPITAL | Age: 75
End: 2025-02-05
Payer: MEDICARE

## 2025-02-05 ENCOUNTER — ANESTHESIA EVENT (OUTPATIENT)
Dept: PERIOP | Facility: HOSPITAL | Age: 75
End: 2025-02-05
Payer: MEDICARE

## 2025-02-05 ENCOUNTER — ANCILLARY PROCEDURE (OUTPATIENT)
Dept: PERIOP | Facility: HOSPITAL | Age: 75
DRG: 220 | End: 2025-02-05
Payer: MEDICARE

## 2025-02-05 ENCOUNTER — HOSPITAL ENCOUNTER (INPATIENT)
Facility: HOSPITAL | Age: 75
LOS: 4 days | Discharge: HOME HEALTH | DRG: 220 | End: 2025-02-09
Attending: THORACIC SURGERY (CARDIOTHORACIC VASCULAR SURGERY) | Admitting: THORACIC SURGERY (CARDIOTHORACIC VASCULAR SURGERY)
Payer: MEDICARE

## 2025-02-05 ENCOUNTER — APPOINTMENT (OUTPATIENT)
Dept: GENERAL RADIOLOGY | Facility: HOSPITAL | Age: 75
DRG: 220 | End: 2025-02-05
Payer: MEDICARE

## 2025-02-05 DIAGNOSIS — R09.02 HYPOXIA: ICD-10-CM

## 2025-02-05 DIAGNOSIS — I35.0 NONRHEUMATIC AORTIC VALVE STENOSIS: ICD-10-CM

## 2025-02-05 DIAGNOSIS — I48.19 PERSISTENT ATRIAL FIBRILLATION: ICD-10-CM

## 2025-02-05 DIAGNOSIS — Z95.2 S/P AVR (AORTIC VALVE REPLACEMENT): Primary | ICD-10-CM

## 2025-02-05 LAB
ABO GROUP BLD: NORMAL
ACT BLD: 112 SECONDS (ref 82–152)
ACT BLD: 129 SECONDS (ref 82–152)
ACT BLD: 400 SECONDS (ref 82–152)
ACT BLD: 464 SECONDS (ref 82–152)
ACT BLD: 682 SECONDS (ref 82–152)
ALBUMIN SERPL-MCNC: 4 G/DL (ref 3.5–5.2)
ALBUMIN SERPL-MCNC: 4.3 G/DL (ref 3.5–5.2)
ANION GAP SERPL CALCULATED.3IONS-SCNC: 12.8 MMOL/L (ref 5–15)
ANION GAP SERPL CALCULATED.3IONS-SCNC: 13 MMOL/L (ref 5–15)
APTT PPP: 28.5 SECONDS (ref 22.7–35.4)
APTT PPP: 30.6 SECONDS (ref 22.7–35.4)
ARTERIAL PATENCY WRIST A: ABNORMAL
ATMOSPHERIC PRESS: 748.3 MMHG
ATMOSPHERIC PRESS: 749.6 MMHG
ATMOSPHERIC PRESS: 750 MMHG
BASE EXCESS BLDA CALC-SCNC: -0.4 MMOL/L (ref 0–2)
BASE EXCESS BLDA CALC-SCNC: -2.8 MMOL/L (ref 0–2)
BASE EXCESS BLDA CALC-SCNC: 1.8 MMOL/L (ref 0–2)
BASOPHILS # BLD AUTO: 0 10*3/MM3 (ref 0–0.2)
BASOPHILS NFR BLD AUTO: 0 % (ref 0–1.5)
BDY SITE: ABNORMAL
BUN SERPL-MCNC: 15 MG/DL (ref 8–23)
BUN SERPL-MCNC: 16 MG/DL (ref 8–23)
BUN/CREAT SERPL: 17 (ref 7–25)
BUN/CREAT SERPL: 18.3 (ref 7–25)
CA-I SERPL ISE-MCNC: 1 MMOL/L (ref 1.15–1.35)
CALCIUM SPEC-SCNC: 7.7 MG/DL (ref 8.6–10.5)
CALCIUM SPEC-SCNC: 8.5 MG/DL (ref 8.6–10.5)
CHLORIDE SERPL-SCNC: 104 MMOL/L (ref 98–107)
CHLORIDE SERPL-SCNC: 107 MMOL/L (ref 98–107)
CO2 BLDA-SCNC: 22.7 MMOL/L (ref 23–27)
CO2 BLDA-SCNC: 23.8 MMOL/L (ref 23–27)
CO2 BLDA-SCNC: 25 MMOL/L (ref 23–27)
CO2 SERPL-SCNC: 20 MMOL/L (ref 22–29)
CO2 SERPL-SCNC: 22.2 MMOL/L (ref 22–29)
CREAT SERPL-MCNC: 0.82 MG/DL (ref 0.76–1.27)
CREAT SERPL-MCNC: 0.94 MG/DL (ref 0.76–1.27)
DEPRECATED RDW RBC AUTO: 41.2 FL (ref 37–54)
DEPRECATED RDW RBC AUTO: 42 FL (ref 37–54)
DEPRECATED RDW RBC AUTO: 43.1 FL (ref 37–54)
EGFRCR SERPLBLD CKD-EPI 2021: 83.5 ML/MIN/1.73
EGFRCR SERPLBLD CKD-EPI 2021: 90.5 ML/MIN/1.73
EOSINOPHIL # BLD AUTO: 0.1 10*3/MM3 (ref 0–0.4)
EOSINOPHIL NFR BLD AUTO: 1.1 % (ref 0.3–6.2)
ERYTHROCYTE [DISTWIDTH] IN BLOOD BY AUTOMATED COUNT: 12.6 % (ref 12.3–15.4)
ERYTHROCYTE [DISTWIDTH] IN BLOOD BY AUTOMATED COUNT: 12.8 % (ref 12.3–15.4)
ERYTHROCYTE [DISTWIDTH] IN BLOOD BY AUTOMATED COUNT: 12.8 % (ref 12.3–15.4)
FIBRINOGEN PPP-MCNC: 243 MG/DL (ref 219–464)
FIBRINOGEN PPP-MCNC: 271 MG/DL (ref 219–464)
FIBRINOGEN PPP-MCNC: 278 MG/DL (ref 219–464)
GLUCOSE BLDC GLUCOMTR-MCNC: 138 MG/DL (ref 70–130)
GLUCOSE BLDC GLUCOMTR-MCNC: 143 MG/DL (ref 70–130)
GLUCOSE BLDC GLUCOMTR-MCNC: 148 MG/DL (ref 70–130)
GLUCOSE BLDC GLUCOMTR-MCNC: 149 MG/DL (ref 70–130)
GLUCOSE BLDC GLUCOMTR-MCNC: 152 MG/DL (ref 70–130)
GLUCOSE SERPL-MCNC: 147 MG/DL (ref 65–99)
GLUCOSE SERPL-MCNC: 161 MG/DL (ref 65–99)
HCO3 BLDA-SCNC: 21.7 MMOL/L (ref 22–28)
HCO3 BLDA-SCNC: 22.8 MMOL/L (ref 22–28)
HCO3 BLDA-SCNC: 24.1 MMOL/L (ref 22–28)
HCT VFR BLD AUTO: 31.5 % (ref 37.5–51)
HCT VFR BLD AUTO: 36 % (ref 37.5–51)
HCT VFR BLD AUTO: 38 % (ref 37.5–51)
HEMODILUTION: NO
HGB BLD-MCNC: 11 G/DL (ref 13–17.7)
HGB BLD-MCNC: 12.4 G/DL (ref 13–17.7)
HGB BLD-MCNC: 13.5 G/DL (ref 13–17.7)
IMM GRANULOCYTES # BLD AUTO: 0.05 10*3/MM3 (ref 0–0.05)
IMM GRANULOCYTES NFR BLD AUTO: 0.5 % (ref 0–0.5)
INHALED O2 CONCENTRATION: 100 %
INHALED O2 CONCENTRATION: 40 %
INHALED O2 CONCENTRATION: 40 %
INR PPP: 1.44 (ref 0.9–1.1)
INR PPP: 1.62 (ref 0.9–1.1)
LYMPHOCYTES # BLD AUTO: 0.51 10*3/MM3 (ref 0.7–3.1)
LYMPHOCYTES NFR BLD AUTO: 5.6 % (ref 19.6–45.3)
MAGNESIUM SERPL-MCNC: 2.2 MG/DL (ref 1.6–2.4)
MAGNESIUM SERPL-MCNC: 2.5 MG/DL (ref 1.6–2.4)
MCH RBC QN AUTO: 32.1 PG (ref 26.6–33)
MCH RBC QN AUTO: 32.2 PG (ref 26.6–33)
MCH RBC QN AUTO: 32.4 PG (ref 26.6–33)
MCHC RBC AUTO-ENTMCNC: 34.4 G/DL (ref 31.5–35.7)
MCHC RBC AUTO-ENTMCNC: 34.9 G/DL (ref 31.5–35.7)
MCHC RBC AUTO-ENTMCNC: 35.5 G/DL (ref 31.5–35.7)
MCV RBC AUTO: 90.5 FL (ref 79–97)
MCV RBC AUTO: 92.1 FL (ref 79–97)
MCV RBC AUTO: 94 FL (ref 79–97)
MODALITY: ABNORMAL
MONOCYTES # BLD AUTO: 0.54 10*3/MM3 (ref 0.1–0.9)
MONOCYTES NFR BLD AUTO: 5.9 % (ref 5–12)
NEUTROPHILS NFR BLD AUTO: 7.98 10*3/MM3 (ref 1.7–7)
NEUTROPHILS NFR BLD AUTO: 86.9 % (ref 42.7–76)
NRBC BLD AUTO-RTO: 0 /100 WBC (ref 0–0.2)
O2 A-A PPRESDIFF RESPIRATORY: 0.6 MMHG
PCO2 BLDA: 29.7 MM HG (ref 35–45)
PCO2 BLDA: 32.2 MM HG (ref 35–45)
PCO2 BLDA: 35.7 MM HG (ref 35–45)
PEEP RESPIRATORY: 8 CM[H2O]
PH BLDA: 7.39 PH UNITS (ref 7.35–7.45)
PH BLDA: 7.46 PH UNITS (ref 7.35–7.45)
PH BLDA: 7.52 PH UNITS (ref 7.35–7.45)
PHOSPHATE SERPL-MCNC: 2.3 MG/DL (ref 2.5–4.5)
PHOSPHATE SERPL-MCNC: 2.5 MG/DL (ref 2.5–4.5)
PLATELET # BLD AUTO: 101 10*3/MM3 (ref 140–450)
PLATELET # BLD AUTO: 147 10*3/MM3 (ref 140–450)
PLATELET # BLD AUTO: 147 10*3/MM3 (ref 140–450)
PLATELET # BLD AUTO: 154 10*3/MM3 (ref 140–450)
PMV BLD AUTO: 9.6 FL (ref 6–12)
PMV BLD AUTO: 9.8 FL (ref 6–12)
PMV BLD AUTO: 9.8 FL (ref 6–12)
PO2 BLD: 371 MM[HG] (ref 0–500)
PO2 BLD: 392 MM[HG] (ref 0–500)
PO2 BLD: 411 MM[HG] (ref 0–500)
PO2 BLDA: 148.2 MM HG (ref 80–100)
PO2 BLDA: 164.3 MM HG (ref 80–100)
PO2 BLDA: 392.3 MM HG (ref 80–100)
POTASSIUM SERPL-SCNC: 3.6 MMOL/L (ref 3.5–5.2)
POTASSIUM SERPL-SCNC: 3.7 MMOL/L (ref 3.5–5.2)
POTASSIUM SERPL-SCNC: 3.9 MMOL/L (ref 3.5–5.2)
PROTHROMBIN TIME: 17.6 SECONDS (ref 11.7–14.2)
PROTHROMBIN TIME: 19.3 SECONDS (ref 11.7–14.2)
PSV: 8 CMH2O
RBC # BLD AUTO: 3.42 10*6/MM3 (ref 4.14–5.8)
RBC # BLD AUTO: 3.83 10*6/MM3 (ref 4.14–5.8)
RBC # BLD AUTO: 4.2 10*6/MM3 (ref 4.14–5.8)
RH BLD: POSITIVE
SAO2 % BLDCOA: 100 % (ref 92–98.5)
SAO2 % BLDCOA: 99.4 % (ref 92–98.5)
SAO2 % BLDCOA: 99.5 % (ref 92–98.5)
SET MECH RESP RATE: 14
SET MECH RESP RATE: 15
SODIUM SERPL-SCNC: 139 MMOL/L (ref 136–145)
SODIUM SERPL-SCNC: 140 MMOL/L (ref 136–145)
TOTAL RATE: 11 BREATHS/MINUTE
TOTAL RATE: 14 BREATHS/MINUTE
TOTAL RATE: 15 BREATHS/MINUTE
VENTILATOR MODE: ABNORMAL
VENTILATOR MODE: AC
VENTILATOR MODE: AC
VT ON VENT VENT: 650 ML
VT ON VENT VENT: 750 ML
WBC NRBC COR # BLD AUTO: 11.41 10*3/MM3 (ref 3.4–10.8)
WBC NRBC COR # BLD AUTO: 6.01 10*3/MM3 (ref 3.4–10.8)
WBC NRBC COR # BLD AUTO: 9.18 10*3/MM3 (ref 3.4–10.8)

## 2025-02-05 PROCEDURE — 85610 PROTHROMBIN TIME: CPT | Performed by: NURSE PRACTITIONER

## 2025-02-05 PROCEDURE — 25010000002 ALBUMIN HUMAN 25% PER 50 ML

## 2025-02-05 PROCEDURE — 25010000002 NICARDIPINE 2.5 MG/ML SOLUTION 10 ML VIAL: Performed by: ANESTHESIOLOGY

## 2025-02-05 PROCEDURE — 85014 HEMATOCRIT: CPT

## 2025-02-05 PROCEDURE — 25010000002 CEFAZOLIN PER 500 MG: Performed by: NURSE PRACTITIONER

## 2025-02-05 PROCEDURE — C1889 IMPLANT/INSERT DEVICE, NOC: HCPCS | Performed by: THORACIC SURGERY (CARDIOTHORACIC VASCULAR SURGERY)

## 2025-02-05 PROCEDURE — P9041 ALBUMIN (HUMAN),5%, 50ML: HCPCS | Performed by: NURSE PRACTITIONER

## 2025-02-05 PROCEDURE — P9100 PATHOGEN TEST FOR PLATELETS: HCPCS

## 2025-02-05 PROCEDURE — 94799 UNLISTED PULMONARY SVC/PX: CPT

## 2025-02-05 PROCEDURE — 84132 ASSAY OF SERUM POTASSIUM: CPT | Performed by: THORACIC SURGERY (CARDIOTHORACIC VASCULAR SURGERY)

## 2025-02-05 PROCEDURE — 71045 X-RAY EXAM CHEST 1 VIEW: CPT

## 2025-02-05 PROCEDURE — 25010000002 CALCIUM GLUCONATE 2-0.675 GM/100ML-% SOLUTION: Performed by: ANESTHESIOLOGY

## 2025-02-05 PROCEDURE — C1713 ANCHOR/SCREW BN/BN,TIS/BN: HCPCS | Performed by: THORACIC SURGERY (CARDIOTHORACIC VASCULAR SURGERY)

## 2025-02-05 PROCEDURE — 33405 REPLACEMENT AORTIC VALVE OPN: CPT | Performed by: THORACIC SURGERY (CARDIOTHORACIC VASCULAR SURGERY)

## 2025-02-05 PROCEDURE — 25010000002 VANCOMYCIN 1 G RECONSTITUTED SOLUTION

## 2025-02-05 PROCEDURE — 36430 TRANSFUSION BLD/BLD COMPNT: CPT

## 2025-02-05 PROCEDURE — 25010000002 PROPOFOL 10 MG/ML EMULSION: Performed by: ANESTHESIOLOGY

## 2025-02-05 PROCEDURE — 85347 COAGULATION TIME ACTIVATED: CPT

## 2025-02-05 PROCEDURE — 93318 ECHO TRANSESOPHAGEAL INTRAOP: CPT | Performed by: ANESTHESIOLOGY

## 2025-02-05 PROCEDURE — 82947 ASSAY GLUCOSE BLOOD QUANT: CPT

## 2025-02-05 PROCEDURE — 83735 ASSAY OF MAGNESIUM: CPT | Performed by: NURSE PRACTITIONER

## 2025-02-05 PROCEDURE — 5A1221Z PERFORMANCE OF CARDIAC OUTPUT, CONTINUOUS: ICD-10-PCS | Performed by: THORACIC SURGERY (CARDIOTHORACIC VASCULAR SURGERY)

## 2025-02-05 PROCEDURE — 85027 COMPLETE CBC AUTOMATED: CPT | Performed by: NURSE PRACTITIONER

## 2025-02-05 PROCEDURE — 25010000002 HEPARIN (PORCINE) PER 1000 UNITS

## 2025-02-05 PROCEDURE — 25010000002 HEPARIN (PORCINE) PER 1000 UNITS: Performed by: ANESTHESIOLOGY

## 2025-02-05 PROCEDURE — 02RF08Z REPLACEMENT OF AORTIC VALVE WITH ZOOPLASTIC TISSUE, OPEN APPROACH: ICD-10-PCS | Performed by: THORACIC SURGERY (CARDIOTHORACIC VASCULAR SURGERY)

## 2025-02-05 PROCEDURE — 25810000003 SODIUM CHLORIDE 0.9 % SOLUTION

## 2025-02-05 PROCEDURE — 85027 COMPLETE CBC AUTOMATED: CPT | Performed by: THORACIC SURGERY (CARDIOTHORACIC VASCULAR SURGERY)

## 2025-02-05 PROCEDURE — P9047 ALBUMIN (HUMAN), 25%, 50ML: HCPCS

## 2025-02-05 PROCEDURE — 86901 BLOOD TYPING SEROLOGIC RH(D): CPT

## 2025-02-05 PROCEDURE — 25010000002 LIDOCAINE HCL (CARDIAC) 50 MG/5ML SOLUTION PREFILLED SYRINGE

## 2025-02-05 PROCEDURE — 82948 REAGENT STRIP/BLOOD GLUCOSE: CPT

## 2025-02-05 PROCEDURE — C1751 CATH, INF, PER/CENT/MIDLINE: HCPCS | Performed by: ANESTHESIOLOGY

## 2025-02-05 PROCEDURE — 33268 EXCL LAA OPN OTH PX ANY METH: CPT | Performed by: SPECIALIST/TECHNOLOGIST, OTHER

## 2025-02-05 PROCEDURE — 99291 CRITICAL CARE FIRST HOUR: CPT | Performed by: ANESTHESIOLOGY

## 2025-02-05 PROCEDURE — 94002 VENT MGMT INPAT INIT DAY: CPT

## 2025-02-05 PROCEDURE — 25010000002 POTASSIUM CHLORIDE PER 2 MEQ: Performed by: THORACIC SURGERY (CARDIOTHORACIC VASCULAR SURGERY)

## 2025-02-05 PROCEDURE — 85018 HEMOGLOBIN: CPT

## 2025-02-05 PROCEDURE — 25010000002 FENTANYL CITRATE (PF) 50 MCG/ML SOLUTION: Performed by: ANESTHESIOLOGY

## 2025-02-05 PROCEDURE — 88305 TISSUE EXAM BY PATHOLOGIST: CPT | Performed by: THORACIC SURGERY (CARDIOTHORACIC VASCULAR SURGERY)

## 2025-02-05 PROCEDURE — 86900 BLOOD TYPING SEROLOGIC ABO: CPT

## 2025-02-05 PROCEDURE — 85384 FIBRINOGEN ACTIVITY: CPT | Performed by: NURSE PRACTITIONER

## 2025-02-05 PROCEDURE — B24BZZ4 ULTRASONOGRAPHY OF HEART WITH AORTA, TRANSESOPHAGEAL: ICD-10-PCS | Performed by: THORACIC SURGERY (CARDIOTHORACIC VASCULAR SURGERY)

## 2025-02-05 PROCEDURE — 93010 ELECTROCARDIOGRAM REPORT: CPT | Performed by: INTERNAL MEDICINE

## 2025-02-05 PROCEDURE — 85025 COMPLETE CBC W/AUTO DIFF WBC: CPT | Performed by: NURSE PRACTITIONER

## 2025-02-05 PROCEDURE — 85730 THROMBOPLASTIN TIME PARTIAL: CPT | Performed by: THORACIC SURGERY (CARDIOTHORACIC VASCULAR SURGERY)

## 2025-02-05 PROCEDURE — 85610 PROTHROMBIN TIME: CPT | Performed by: THORACIC SURGERY (CARDIOTHORACIC VASCULAR SURGERY)

## 2025-02-05 PROCEDURE — 85730 THROMBOPLASTIN TIME PARTIAL: CPT | Performed by: NURSE PRACTITIONER

## 2025-02-05 PROCEDURE — 82803 BLOOD GASES ANY COMBINATION: CPT | Performed by: NURSE PRACTITIONER

## 2025-02-05 PROCEDURE — 33405 REPLACEMENT AORTIC VALVE OPN: CPT | Performed by: SPECIALIST/TECHNOLOGIST, OTHER

## 2025-02-05 PROCEDURE — 82803 BLOOD GASES ANY COMBINATION: CPT

## 2025-02-05 PROCEDURE — 25010000002 PROPOFOL 10 MG/ML EMULSION: Performed by: NURSE PRACTITIONER

## 2025-02-05 PROCEDURE — 25010000002 CEFAZOLIN PER 500 MG: Performed by: PHYSICIAN ASSISTANT

## 2025-02-05 PROCEDURE — 33268 EXCL LAA OPN OTH PX ANY METH: CPT | Performed by: THORACIC SURGERY (CARDIOTHORACIC VASCULAR SURGERY)

## 2025-02-05 PROCEDURE — C1729 CATH, DRAINAGE: HCPCS | Performed by: THORACIC SURGERY (CARDIOTHORACIC VASCULAR SURGERY)

## 2025-02-05 PROCEDURE — 85384 FIBRINOGEN ACTIVITY: CPT | Performed by: THORACIC SURGERY (CARDIOTHORACIC VASCULAR SURGERY)

## 2025-02-05 PROCEDURE — 25010000002 AMIODARONE IN DEXTROSE 5% 360-4.14 MG/200ML-% SOLUTION: Performed by: ANESTHESIOLOGY

## 2025-02-05 PROCEDURE — 25010000002 PROTAMINE SULFATE PER 10 MG: Performed by: ANESTHESIOLOGY

## 2025-02-05 PROCEDURE — 25810000003 SODIUM CHLORIDE 0.9 % SOLUTION 250 ML FLEX CONT: Performed by: ANESTHESIOLOGY

## 2025-02-05 PROCEDURE — 25010000002 ACETAMINOPHEN 10 MG/ML SOLUTION: Performed by: NURSE PRACTITIONER

## 2025-02-05 PROCEDURE — 25010000002 METOCLOPRAMIDE PER 10 MG: Performed by: NURSE PRACTITIONER

## 2025-02-05 PROCEDURE — 02L70CK OCCLUSION OF LEFT ATRIAL APPENDAGE WITH EXTRALUMINAL DEVICE, OPEN APPROACH: ICD-10-PCS | Performed by: THORACIC SURGERY (CARDIOTHORACIC VASCULAR SURGERY)

## 2025-02-05 PROCEDURE — 93005 ELECTROCARDIOGRAM TRACING: CPT | Performed by: NURSE PRACTITIONER

## 2025-02-05 PROCEDURE — 25010000002 ALBUMIN HUMAN 5% PER 50 ML: Performed by: NURSE PRACTITIONER

## 2025-02-05 PROCEDURE — 25010000002 MAGNESIUM SULFATE PER 500 MG OF MAGNESIUM: Performed by: ANESTHESIOLOGY

## 2025-02-05 PROCEDURE — 80069 RENAL FUNCTION PANEL: CPT | Performed by: NURSE PRACTITIONER

## 2025-02-05 PROCEDURE — 82330 ASSAY OF CALCIUM: CPT | Performed by: NURSE PRACTITIONER

## 2025-02-05 PROCEDURE — 25010000002 NICARDIPINE 2.5 MG/ML SOLUTION: Performed by: ANESTHESIOLOGY

## 2025-02-05 PROCEDURE — P9035 PLATELET PHERES LEUKOREDUCED: HCPCS

## 2025-02-05 PROCEDURE — 25010000002 MIDAZOLAM PER 1 MG: Performed by: ANESTHESIOLOGY

## 2025-02-05 PROCEDURE — 85049 AUTOMATED PLATELET COUNT: CPT | Performed by: THORACIC SURGERY (CARDIOTHORACIC VASCULAR SURGERY)

## 2025-02-05 DEVICE — APPL CLIP LAA ATRICLIP/FLEXV 35MM: Type: IMPLANTABLE DEVICE | Site: HEART | Status: FUNCTIONAL

## 2025-02-05 DEVICE — SS SUTURE, 3 PER SLEEVE
Type: IMPLANTABLE DEVICE | Site: STERNUM | Status: FUNCTIONAL
Brand: MYO/WIRE II

## 2025-02-05 DEVICE — VLV PERICARD PERIMOUNT MAGNA EASE 29: Type: IMPLANTABLE DEVICE | Site: HEART | Status: FUNCTIONAL

## 2025-02-05 DEVICE — SS SUTURE, 4 PER SLEEVE
Type: IMPLANTABLE DEVICE | Site: STERNUM | Status: FUNCTIONAL
Brand: MYO/WIRE II

## 2025-02-05 RX ORDER — ACETAMINOPHEN 325 MG/1
650 TABLET ORAL EVERY 4 HOURS
Status: DISCONTINUED | OUTPATIENT
Start: 2025-02-05 | End: 2025-02-05 | Stop reason: SDUPTHER

## 2025-02-05 RX ORDER — PHENYLEPHRINE HCL IN 0.9% NACL 0.5 MG/5ML
.2-2 SYRINGE (ML) INTRAVENOUS CONTINUOUS PRN
Status: DISCONTINUED | OUTPATIENT
Start: 2025-02-05 | End: 2025-02-06

## 2025-02-05 RX ORDER — PANTOPRAZOLE SODIUM 40 MG/1
40 TABLET, DELAYED RELEASE ORAL EVERY MORNING
Status: DISCONTINUED | OUTPATIENT
Start: 2025-02-06 | End: 2025-02-09 | Stop reason: HOSPADM

## 2025-02-05 RX ORDER — DEXMEDETOMIDINE HYDROCHLORIDE 4 UG/ML
INJECTION INTRAVENOUS CONTINUOUS PRN
Status: DISCONTINUED | OUTPATIENT
Start: 2025-02-05 | End: 2025-02-05 | Stop reason: SURG

## 2025-02-05 RX ORDER — AMINOCAPROIC ACID 250 MG/ML
INJECTION, SOLUTION INTRAVENOUS AS NEEDED
Status: DISCONTINUED | OUTPATIENT
Start: 2025-02-05 | End: 2025-02-05 | Stop reason: SURG

## 2025-02-05 RX ORDER — SODIUM CHLORIDE, SODIUM LACTATE, POTASSIUM CHLORIDE, CALCIUM CHLORIDE 600; 310; 30; 20 MG/100ML; MG/100ML; MG/100ML; MG/100ML
9 INJECTION, SOLUTION INTRAVENOUS CONTINUOUS
Status: DISCONTINUED | OUTPATIENT
Start: 2025-02-05 | End: 2025-02-06

## 2025-02-05 RX ORDER — MILRINONE LACTATE 0.2 MG/ML
.25-.375 INJECTION, SOLUTION INTRAVENOUS CONTINUOUS PRN
Status: DISCONTINUED | OUTPATIENT
Start: 2025-02-05 | End: 2025-02-06

## 2025-02-05 RX ORDER — MORPHINE SULFATE 2 MG/ML
1 INJECTION, SOLUTION INTRAMUSCULAR; INTRAVENOUS EVERY 4 HOURS PRN
Status: DISCONTINUED | OUTPATIENT
Start: 2025-02-05 | End: 2025-02-09 | Stop reason: HOSPADM

## 2025-02-05 RX ORDER — AMOXICILLIN 250 MG
2 CAPSULE ORAL NIGHTLY
Status: DISCONTINUED | OUTPATIENT
Start: 2025-02-06 | End: 2025-02-09 | Stop reason: HOSPADM

## 2025-02-05 RX ORDER — ACETAMINOPHEN 325 MG/1
650 TABLET ORAL EVERY 4 HOURS PRN
Status: DISCONTINUED | OUTPATIENT
Start: 2025-02-06 | End: 2025-02-09 | Stop reason: HOSPADM

## 2025-02-05 RX ORDER — ATORVASTATIN CALCIUM 20 MG/1
40 TABLET, FILM COATED ORAL NIGHTLY
Status: DISCONTINUED | OUTPATIENT
Start: 2025-02-05 | End: 2025-02-09 | Stop reason: HOSPADM

## 2025-02-05 RX ORDER — NOREPINEPHRINE BITARTRATE 0.03 MG/ML
.02-.2 INJECTION, SOLUTION INTRAVENOUS CONTINUOUS PRN
Status: DISCONTINUED | OUTPATIENT
Start: 2025-02-05 | End: 2025-02-06

## 2025-02-05 RX ORDER — MAGNESIUM HYDROXIDE 1200 MG/15ML
LIQUID ORAL AS NEEDED
Status: DISCONTINUED | OUTPATIENT
Start: 2025-02-05 | End: 2025-02-05 | Stop reason: HOSPADM

## 2025-02-05 RX ORDER — CHLORHEXIDINE GLUCONATE ORAL RINSE 1.2 MG/ML
15 SOLUTION DENTAL ONCE
Status: COMPLETED | OUTPATIENT
Start: 2025-02-05 | End: 2025-02-05

## 2025-02-05 RX ORDER — METOCLOPRAMIDE HYDROCHLORIDE 5 MG/ML
10 INJECTION INTRAMUSCULAR; INTRAVENOUS EVERY 6 HOURS
Status: COMPLETED | OUTPATIENT
Start: 2025-02-05 | End: 2025-02-06

## 2025-02-05 RX ORDER — ACETAMINOPHEN 160 MG/5ML
650 SOLUTION ORAL EVERY 4 HOURS
Status: DISCONTINUED | OUTPATIENT
Start: 2025-02-05 | End: 2025-02-05 | Stop reason: SDUPTHER

## 2025-02-05 RX ORDER — SODIUM CHLORIDE 9 MG/ML
INJECTION, SOLUTION INTRAVENOUS CONTINUOUS PRN
Status: DISCONTINUED | OUTPATIENT
Start: 2025-02-05 | End: 2025-02-05 | Stop reason: SURG

## 2025-02-05 RX ORDER — MAGNESIUM SULFATE HEPTAHYDRATE 40 MG/ML
2 INJECTION, SOLUTION INTRAVENOUS EVERY 8 HOURS
Status: DISPENSED | OUTPATIENT
Start: 2025-02-05 | End: 2025-02-06

## 2025-02-05 RX ORDER — NICARDIPINE HYDROCHLORIDE 2.5 MG/ML
INJECTION INTRAVENOUS AS NEEDED
Status: DISCONTINUED | OUTPATIENT
Start: 2025-02-05 | End: 2025-02-05 | Stop reason: SURG

## 2025-02-05 RX ORDER — METOPROLOL TARTRATE 25 MG/1
12.5 TABLET, FILM COATED ORAL EVERY 12 HOURS SCHEDULED
Status: DISCONTINUED | OUTPATIENT
Start: 2025-02-06 | End: 2025-02-06

## 2025-02-05 RX ORDER — IBUPROFEN 600 MG/1
1 TABLET ORAL
Status: DISCONTINUED | OUTPATIENT
Start: 2025-02-05 | End: 2025-02-06

## 2025-02-05 RX ORDER — NITROGLYCERIN 0.4 MG/1
0.4 TABLET SUBLINGUAL
Status: DISCONTINUED | OUTPATIENT
Start: 2025-02-05 | End: 2025-02-09 | Stop reason: HOSPADM

## 2025-02-05 RX ORDER — ACETAMINOPHEN 650 MG/1
650 SUPPOSITORY RECTAL EVERY 4 HOURS
Status: DISCONTINUED | OUTPATIENT
Start: 2025-02-05 | End: 2025-02-05 | Stop reason: SDUPTHER

## 2025-02-05 RX ORDER — ACETAMINOPHEN 160 MG/5ML
650 SOLUTION ORAL EVERY 4 HOURS PRN
Status: DISCONTINUED | OUTPATIENT
Start: 2025-02-06 | End: 2025-02-09 | Stop reason: HOSPADM

## 2025-02-05 RX ORDER — POLYETHYLENE GLYCOL 3350 17 G/17G
17 POWDER, FOR SOLUTION ORAL DAILY PRN
Status: DISCONTINUED | OUTPATIENT
Start: 2025-02-05 | End: 2025-02-09 | Stop reason: HOSPADM

## 2025-02-05 RX ORDER — POTASSIUM CHLORIDE 29.8 MG/ML
20 INJECTION INTRAVENOUS ONCE
Status: COMPLETED | OUTPATIENT
Start: 2025-02-05 | End: 2025-02-05

## 2025-02-05 RX ORDER — MAGNESIUM SULFATE HEPTAHYDRATE 500 MG/ML
INJECTION, SOLUTION INTRAMUSCULAR; INTRAVENOUS AS NEEDED
Status: DISCONTINUED | OUTPATIENT
Start: 2025-02-05 | End: 2025-02-05 | Stop reason: SURG

## 2025-02-05 RX ORDER — CHLORHEXIDINE GLUCONATE 500 MG/1
1 CLOTH TOPICAL EVERY 12 HOURS PRN
Status: DISCONTINUED | OUTPATIENT
Start: 2025-02-05 | End: 2025-02-05 | Stop reason: HOSPADM

## 2025-02-05 RX ORDER — FENTANYL CITRATE 50 UG/ML
25 INJECTION, SOLUTION INTRAMUSCULAR; INTRAVENOUS ONCE AS NEEDED
Status: DISCONTINUED | OUTPATIENT
Start: 2025-02-05 | End: 2025-02-05 | Stop reason: HOSPADM

## 2025-02-05 RX ORDER — MORPHINE SULFATE 2 MG/ML
4 INJECTION, SOLUTION INTRAMUSCULAR; INTRAVENOUS
Status: DISCONTINUED | OUTPATIENT
Start: 2025-02-05 | End: 2025-02-06

## 2025-02-05 RX ORDER — DEXMEDETOMIDINE HYDROCHLORIDE 4 UG/ML
.2-1.5 INJECTION, SOLUTION INTRAVENOUS
Status: DISCONTINUED | OUTPATIENT
Start: 2025-02-05 | End: 2025-02-06

## 2025-02-05 RX ORDER — ONDANSETRON 2 MG/ML
4 INJECTION INTRAMUSCULAR; INTRAVENOUS EVERY 6 HOURS PRN
Status: DISCONTINUED | OUTPATIENT
Start: 2025-02-05 | End: 2025-02-09 | Stop reason: HOSPADM

## 2025-02-05 RX ORDER — BISACODYL 10 MG
10 SUPPOSITORY, RECTAL RECTAL DAILY PRN
Status: DISCONTINUED | OUTPATIENT
Start: 2025-02-06 | End: 2025-02-09 | Stop reason: HOSPADM

## 2025-02-05 RX ORDER — MIDAZOLAM HYDROCHLORIDE 1 MG/ML
0.5 INJECTION, SOLUTION INTRAMUSCULAR; INTRAVENOUS
Status: DISCONTINUED | OUTPATIENT
Start: 2025-02-05 | End: 2025-02-05 | Stop reason: HOSPADM

## 2025-02-05 RX ORDER — METOPROLOL TARTRATE 25 MG/1
12.5 TABLET, FILM COATED ORAL EVERY 12 HOURS SCHEDULED
Status: DISCONTINUED | OUTPATIENT
Start: 2025-02-05 | End: 2025-02-05

## 2025-02-05 RX ORDER — ACETAMINOPHEN 650 MG/1
650 SUPPOSITORY RECTAL EVERY 4 HOURS PRN
Status: DISCONTINUED | OUTPATIENT
Start: 2025-02-06 | End: 2025-02-09 | Stop reason: HOSPADM

## 2025-02-05 RX ORDER — METHADONE HYDROCHLORIDE 10 MG/ML
INJECTION, SOLUTION INTRAMUSCULAR; INTRAVENOUS; SUBCUTANEOUS AS NEEDED
Status: DISCONTINUED | OUTPATIENT
Start: 2025-02-05 | End: 2025-02-05 | Stop reason: SURG

## 2025-02-05 RX ORDER — NALOXONE HCL 0.4 MG/ML
0.4 VIAL (ML) INJECTION
Status: DISCONTINUED | OUTPATIENT
Start: 2025-02-05 | End: 2025-02-09 | Stop reason: HOSPADM

## 2025-02-05 RX ORDER — PROPOFOL 10 MG/ML
VIAL (ML) INTRAVENOUS AS NEEDED
Status: DISCONTINUED | OUTPATIENT
Start: 2025-02-05 | End: 2025-02-05 | Stop reason: SURG

## 2025-02-05 RX ORDER — METOPROLOL TARTRATE 25 MG/1
12.5 TABLET, FILM COATED ORAL
Status: COMPLETED | OUTPATIENT
Start: 2025-02-05 | End: 2025-02-05

## 2025-02-05 RX ORDER — ASPIRIN 81 MG/1
81 TABLET ORAL DAILY
Status: DISCONTINUED | OUTPATIENT
Start: 2025-02-06 | End: 2025-02-09 | Stop reason: HOSPADM

## 2025-02-05 RX ORDER — DEXTROSE MONOHYDRATE 25 G/50ML
10-50 INJECTION, SOLUTION INTRAVENOUS
Status: DISCONTINUED | OUTPATIENT
Start: 2025-02-05 | End: 2025-02-06

## 2025-02-05 RX ORDER — PHENYLEPHRINE HCL IN 0.9% NACL 1 MG/10 ML
SYRINGE (ML) INTRAVENOUS AS NEEDED
Status: DISCONTINUED | OUTPATIENT
Start: 2025-02-05 | End: 2025-02-05 | Stop reason: SURG

## 2025-02-05 RX ORDER — HYDROCODONE BITARTRATE AND ACETAMINOPHEN 5; 325 MG/1; MG/1
2 TABLET ORAL EVERY 4 HOURS PRN
Status: DISCONTINUED | OUTPATIENT
Start: 2025-02-05 | End: 2025-02-09 | Stop reason: HOSPADM

## 2025-02-05 RX ORDER — CALCIUM GLUCONATE 20 MG/ML
2000 INJECTION, SOLUTION INTRAVENOUS ONCE
Status: COMPLETED | OUTPATIENT
Start: 2025-02-05 | End: 2025-02-05

## 2025-02-05 RX ORDER — MEPERIDINE HYDROCHLORIDE 25 MG/ML
25 INJECTION INTRAMUSCULAR; INTRAVENOUS; SUBCUTANEOUS EVERY 4 HOURS PRN
Status: ACTIVE | OUTPATIENT
Start: 2025-02-05 | End: 2025-02-06

## 2025-02-05 RX ORDER — NICOTINE POLACRILEX 4 MG
15 LOZENGE BUCCAL
Status: DISCONTINUED | OUTPATIENT
Start: 2025-02-05 | End: 2025-02-06

## 2025-02-05 RX ORDER — BISACODYL 5 MG/1
10 TABLET, DELAYED RELEASE ORAL DAILY PRN
Status: DISCONTINUED | OUTPATIENT
Start: 2025-02-05 | End: 2025-02-09 | Stop reason: HOSPADM

## 2025-02-05 RX ORDER — OXYCODONE HYDROCHLORIDE 10 MG/1
10 TABLET ORAL EVERY 4 HOURS PRN
Status: DISPENSED | OUTPATIENT
Start: 2025-02-05 | End: 2025-02-07

## 2025-02-05 RX ORDER — PANTOPRAZOLE SODIUM 40 MG/10ML
40 INJECTION, POWDER, LYOPHILIZED, FOR SOLUTION INTRAVENOUS ONCE
Status: COMPLETED | OUTPATIENT
Start: 2025-02-05 | End: 2025-02-05

## 2025-02-05 RX ORDER — SODIUM CHLORIDE 0.9 % (FLUSH) 0.9 %
3-10 SYRINGE (ML) INJECTION AS NEEDED
Status: DISCONTINUED | OUTPATIENT
Start: 2025-02-05 | End: 2025-02-05 | Stop reason: HOSPADM

## 2025-02-05 RX ORDER — ROCURONIUM BROMIDE 10 MG/ML
INJECTION, SOLUTION INTRAVENOUS AS NEEDED
Status: DISCONTINUED | OUTPATIENT
Start: 2025-02-05 | End: 2025-02-05 | Stop reason: SURG

## 2025-02-05 RX ORDER — ENOXAPARIN SODIUM 100 MG/ML
40 INJECTION SUBCUTANEOUS DAILY
Status: DISCONTINUED | OUTPATIENT
Start: 2025-02-06 | End: 2025-02-09 | Stop reason: HOSPADM

## 2025-02-05 RX ORDER — CYCLOBENZAPRINE HCL 10 MG
10 TABLET ORAL EVERY 8 HOURS PRN
Status: DISCONTINUED | OUTPATIENT
Start: 2025-02-06 | End: 2025-02-09 | Stop reason: HOSPADM

## 2025-02-05 RX ORDER — MIDAZOLAM HYDROCHLORIDE 1 MG/ML
2 INJECTION, SOLUTION INTRAMUSCULAR; INTRAVENOUS
Status: DISCONTINUED | OUTPATIENT
Start: 2025-02-05 | End: 2025-02-06

## 2025-02-05 RX ORDER — SODIUM CHLORIDE 0.9 % (FLUSH) 0.9 %
3 SYRINGE (ML) INJECTION EVERY 12 HOURS SCHEDULED
Status: DISCONTINUED | OUTPATIENT
Start: 2025-02-05 | End: 2025-02-05 | Stop reason: HOSPADM

## 2025-02-05 RX ORDER — LIDOCAINE HYDROCHLORIDE 10 MG/ML
0.5 INJECTION, SOLUTION INFILTRATION; PERINEURAL ONCE AS NEEDED
Status: DISCONTINUED | OUTPATIENT
Start: 2025-02-05 | End: 2025-02-05 | Stop reason: HOSPADM

## 2025-02-05 RX ORDER — ALBUMIN HUMAN 50 G/1000ML
1000 SOLUTION INTRAVENOUS AS NEEDED
Status: DISPENSED | OUTPATIENT
Start: 2025-02-05 | End: 2025-02-06

## 2025-02-05 RX ORDER — CHLORHEXIDINE GLUCONATE ORAL RINSE 1.2 MG/ML
15 SOLUTION DENTAL EVERY 12 HOURS
Status: DISCONTINUED | OUTPATIENT
Start: 2025-02-05 | End: 2025-02-08

## 2025-02-05 RX ORDER — FENTANYL CITRATE 50 UG/ML
INJECTION, SOLUTION INTRAMUSCULAR; INTRAVENOUS
Status: COMPLETED | OUTPATIENT
Start: 2025-02-05 | End: 2025-02-05

## 2025-02-05 RX ORDER — ACETAMINOPHEN 10 MG/ML
1000 INJECTION, SOLUTION INTRAVENOUS EVERY 8 HOURS
Status: COMPLETED | OUTPATIENT
Start: 2025-02-05 | End: 2025-02-06

## 2025-02-05 RX ORDER — MIDAZOLAM HYDROCHLORIDE 1 MG/ML
INJECTION, SOLUTION INTRAMUSCULAR; INTRAVENOUS
Status: COMPLETED | OUTPATIENT
Start: 2025-02-05 | End: 2025-02-05

## 2025-02-05 RX ORDER — PROTAMINE SULFATE 10 MG/ML
INJECTION, SOLUTION INTRAVENOUS AS NEEDED
Status: DISCONTINUED | OUTPATIENT
Start: 2025-02-05 | End: 2025-02-05 | Stop reason: SURG

## 2025-02-05 RX ORDER — HEPARIN SODIUM 1000 [USP'U]/ML
INJECTION, SOLUTION INTRAVENOUS; SUBCUTANEOUS AS NEEDED
Status: DISCONTINUED | OUTPATIENT
Start: 2025-02-05 | End: 2025-02-05 | Stop reason: SURG

## 2025-02-05 RX ORDER — NITROGLYCERIN 20 MG/100ML
5-200 INJECTION INTRAVENOUS
Status: DISCONTINUED | OUTPATIENT
Start: 2025-02-05 | End: 2025-02-06

## 2025-02-05 RX ORDER — DOPAMINE HYDROCHLORIDE 160 MG/100ML
2-20 INJECTION, SOLUTION INTRAVENOUS CONTINUOUS PRN
Status: DISCONTINUED | OUTPATIENT
Start: 2025-02-05 | End: 2025-02-06

## 2025-02-05 RX ORDER — ALPRAZOLAM 0.25 MG/1
0.25 TABLET ORAL EVERY 8 HOURS PRN
Status: DISCONTINUED | OUTPATIENT
Start: 2025-02-05 | End: 2025-02-09 | Stop reason: HOSPADM

## 2025-02-05 RX ADMIN — AMIODARONE HYDROCHLORIDE 0.5 MG/MIN: 1.8 INJECTION, SOLUTION INTRAVENOUS at 13:43

## 2025-02-05 RX ADMIN — FENTANYL CITRATE 25 MCG: 50 INJECTION, SOLUTION INTRAMUSCULAR; INTRAVENOUS at 08:51

## 2025-02-05 RX ADMIN — 0.12% CHLORHEXIDINE GLUCONATE 15 ML: 1.2 RINSE ORAL at 15:54

## 2025-02-05 RX ADMIN — HEPARIN SODIUM 27000 UNITS: 1000 INJECTION, SOLUTION INTRAVENOUS; SUBCUTANEOUS at 12:53

## 2025-02-05 RX ADMIN — DEXMEDETOMIDINE HYDROCHLORIDE 1 MCG/KG/HR: 4 INJECTION INTRAVENOUS at 12:05

## 2025-02-05 RX ADMIN — PROPOFOL 35 MCG/KG/MIN: 10 INJECTION, EMULSION INTRAVENOUS at 12:53

## 2025-02-05 RX ADMIN — Medication 3 ML: at 11:50

## 2025-02-05 RX ADMIN — POTASSIUM CHLORIDE 20 MEQ: 29.8 INJECTION, SOLUTION INTRAVENOUS at 16:05

## 2025-02-05 RX ADMIN — PROPOFOL 50 MG: 10 INJECTION, EMULSION INTRAVENOUS at 12:44

## 2025-02-05 RX ADMIN — MIDAZOLAM 0.5 MG: 1 INJECTION INTRAMUSCULAR; INTRAVENOUS at 08:51

## 2025-02-05 RX ADMIN — Medication 100 MCG: at 12:36

## 2025-02-05 RX ADMIN — PROPOFOL 40 MCG/KG/MIN: 10 INJECTION, EMULSION INTRAVENOUS at 16:25

## 2025-02-05 RX ADMIN — MIDAZOLAM 0.5 MG: 1 INJECTION INTRAMUSCULAR; INTRAVENOUS at 11:42

## 2025-02-05 RX ADMIN — CALCIUM GLUCONATE 2000 MG: 20 INJECTION, SOLUTION INTRAVENOUS at 16:41

## 2025-02-05 RX ADMIN — AMINOCAPROIC ACID 10 G: 250 INJECTION, SOLUTION INTRAVENOUS at 13:58

## 2025-02-05 RX ADMIN — METHADONE HYDROCHLORIDE 10 MG: 10 INJECTION, SOLUTION INTRAMUSCULAR; INTRAVENOUS; SUBCUTANEOUS at 12:07

## 2025-02-05 RX ADMIN — MUPIROCIN 1 APPLICATION: 20 OINTMENT TOPICAL at 15:53

## 2025-02-05 RX ADMIN — METOCLOPRAMIDE 10 MG: 5 INJECTION, SOLUTION INTRAMUSCULAR; INTRAVENOUS at 15:54

## 2025-02-05 RX ADMIN — MUPIROCIN 1 APPLICATION: 20 OINTMENT TOPICAL at 21:26

## 2025-02-05 RX ADMIN — SODIUM CHLORIDE: 9 INJECTION, SOLUTION INTRAVENOUS at 11:57

## 2025-02-05 RX ADMIN — ROCURONIUM BROMIDE 50 MG: 10 INJECTION, SOLUTION INTRAVENOUS at 12:09

## 2025-02-05 RX ADMIN — MAGNESIUM SULFATE HEPTAHYDRATE 2 G: 500 INJECTION, SOLUTION INTRAMUSCULAR; INTRAVENOUS at 13:48

## 2025-02-05 RX ADMIN — 0.12% CHLORHEXIDINE GLUCONATE 15 ML: 1.2 RINSE ORAL at 08:17

## 2025-02-05 RX ADMIN — PANTOPRAZOLE SODIUM 40 MG: 40 INJECTION, POWDER, FOR SOLUTION INTRAVENOUS at 15:53

## 2025-02-05 RX ADMIN — NICARDIPINE HYDROCHLORIDE 200 MCG: 25 INJECTION, SOLUTION INTRAVENOUS at 14:04

## 2025-02-05 RX ADMIN — NICARDIPINE HYDROCHLORIDE 200 MCG: 25 INJECTION, SOLUTION INTRAVENOUS at 12:42

## 2025-02-05 RX ADMIN — Medication 100 MCG: at 12:57

## 2025-02-05 RX ADMIN — METOCLOPRAMIDE 10 MG: 5 INJECTION, SOLUTION INTRAMUSCULAR; INTRAVENOUS at 21:30

## 2025-02-05 RX ADMIN — SODIUM CHLORIDE 2000 MG: 900 INJECTION INTRAVENOUS at 14:28

## 2025-02-05 RX ADMIN — SODIUM CHLORIDE: 9 INJECTION, SOLUTION INTRAVENOUS at 12:03

## 2025-02-05 RX ADMIN — Medication 100 MCG: at 12:58

## 2025-02-05 RX ADMIN — NICARDIPINE HYDROCHLORIDE 5 MG/HR: 25 INJECTION, SOLUTION INTRAVENOUS at 12:15

## 2025-02-05 RX ADMIN — PROTAMINE SULFATE 350 MG: 10 INJECTION, SOLUTION INTRAVENOUS at 13:52

## 2025-02-05 RX ADMIN — ALBUMIN (HUMAN) 250 ML: 12.5 INJECTION, SOLUTION INTRAVENOUS at 18:18

## 2025-02-05 RX ADMIN — CEFAZOLIN 2000 MG: 2 INJECTION, POWDER, FOR SOLUTION INTRAMUSCULAR; INTRAVENOUS at 21:30

## 2025-02-05 RX ADMIN — METOPROLOL TARTRATE 12.5 MG: 25 TABLET, FILM COATED ORAL at 08:16

## 2025-02-05 RX ADMIN — METHADONE HYDROCHLORIDE 10 MG: 10 INJECTION, SOLUTION INTRAMUSCULAR; INTRAVENOUS; SUBCUTANEOUS at 12:29

## 2025-02-05 RX ADMIN — ROCURONIUM BROMIDE 20 MG: 10 INJECTION, SOLUTION INTRAVENOUS at 13:14

## 2025-02-05 RX ADMIN — POTASSIUM CHLORIDE 20 MEQ: 29.8 INJECTION, SOLUTION INTRAVENOUS at 21:25

## 2025-02-05 RX ADMIN — SODIUM CHLORIDE 2000 MG: 900 INJECTION INTRAVENOUS at 12:17

## 2025-02-05 RX ADMIN — NICARDIPINE HYDROCHLORIDE 200 MCG: 25 INJECTION, SOLUTION INTRAVENOUS at 13:48

## 2025-02-05 RX ADMIN — PROPOFOL 150 MG: 10 INJECTION, EMULSION INTRAVENOUS at 12:07

## 2025-02-05 RX ADMIN — ACETAMINOPHEN 1000 MG: 10 INJECTION INTRAVENOUS at 15:54

## 2025-02-05 NOTE — OP NOTE
Operative Note    Date of Dictation: 02/05/25    Date of Procedure: Same    Referring Physician: Cecilia Mi MD and Liss FLANAGAN    Preoperative diagnosis:   1.  Severe aortic regurgitation  2.  Aortic root enlargement  3.  Paroxysmal atrial fibrillation status post successful cardioversion  4.  Cardiomyopathy  5.  Congestive heart failure class II, systolic    Postoperative diagnosis:   Same    Procedure:   1.  Elective AVR with a 29 mm magna ease pericardial prosthesis  2.  Left atrial appendage epicardial closure with a 45 mm atrial clip device    Surgeon: Prasanth Martinez MD     Assistants: Assistant: Angelita Quarles CSA was responsible for performing the following activities: Retraction, Suction, Irrigation, Suturing, Closing, Placing Dressing, and all aspects of the complex cardiac case  and their skilled assistance was necessary for the success of this case.     Anesthesia: General endotracheal anesthesia and MICHELLE    Findings:  Annular dilatation and mild and the aortic prolapse    Estimated Blood Loss: Approximately 500 cc, most of it recovered with a Cell Saver device and cardiotomy suckers and was retransfused to the patient    STS Data:    The patient was explained the risks (STS risk score calculated), benefits and alternatives of surgery and agreed to proceed. The antibiotics and b blockers were given in the STS required window.  Counseling was given about diet, alcohol and tobacco use as needed.    Description of the procedure:     The patient was placed supine on the operative table. Anesthesia was given and lines placed. The patient was prepped and draped using the usual sterile technique. A median sternotomy was performed with a scalpel and the layers carried down to the sternum using the electrocautery. The sternum was splited in the midline using a vertical oscillating saw. Hemostasis was achieved. 300 units of IV heparin was given to maintain the ACT over 400.  Cannulation sutures were  placed in the ascending aorta and right atrium. Small cannulas were placed and aorto right atrial cardiopulmonary bypass was started.  A right superior pulmonary vein LV vent was placed without difficulty.  Cardioplegia cannulas were placed and then the ascending aorta was clamped. One liter of cold blood cardioplegia was given in an antegrade fashion to achieved diastolic arrest and further doses every 15 minutes thereafter also using retrograde infusion.  The heart was retracted to the right and the base of the appendage was exposed.  The left appendage was clipped with a 45 mm atrial clip device with good obliteration at its base.  A transverse proximal aortotomy was performed and the valve was exposed. The valve was trileaflet and the aortic root was mildly dilated but there was no indication for reduction or replacement.. The leaflets were resected and annulus debrided and washed out. The annulus was sized to a 29 mm Magna Ease pericardial prosthesis that was washed as recommended by the . 2-0 Prolene continuous suture was used to anastomose each third of the valve with the corresponding part of the annulus.  The valve was seated and the knots secured each post.  The aortotomy was closed with a 4.0 prolene continuous suture using the double layer McGoon technique. The root was de-aired and the aortic clamp removed.  The left pleural space was suctioned and the lungs ventilated. The heart was paced till regular atrial rhythm resumed. I allowed the heart to eject  and I de-aired the left heart chambers under MICHELLE guidance and once hemodynamics were acceptable, then the CPB was discontinued and the venous and cardioplegia cannulas removed. The matching dose of protamine was given and the aortic cannula removed as well. AV temporary wires and pleural and mediastinal chest tubes were placed and the wound sprayed with platelet rich plasma.  The perioperative MICHELLE showed the valve well seated without   perivalvular leaks. The sternum was closed with single and double wires and soft tissue in layers of reabsorbable material. The wounds were covered with sterile dressings.       Specimen removed: Aortic valve leaflets    CPB time: 48 minutes    Aortic clamp time: 33 minutes    Complications:  none           Disposition: Cardiovascular recovery room           Condition: Critical but stable.    complains of pain/discomfort/right ankle

## 2025-02-05 NOTE — PROGRESS NOTES
CVICU Intensivist Progress Note    HPI/Chief Complaint: 76 yo male with h/o Afib, aortic root dilation and severe AI presents to CVICU immediately s/p tissue AVR + ROBLES clip    PMHx: HTN, HL, aortic root dilation, basal cell carcinoma, h/o kidney stones    Procedure: 2/5 tissue AVR + ROBLES clip by Dr. Martinez. MICHELLE showed LVEF 55% with severe AI that resolved after valve replacement. He received 1unit platelets and 620ml of cellsaver. Shocked x3. He is brought to the CVICU on sedation, amio and insulin drip being paced at 80 bpm with sinus in the 50s underlying. Cardene started shortly after arrival    Hospital Course: n/a    Daily Assessment and Plan 2/5: Initial hemodynamics appropriate on swan CVP 11, PA 30s/10, CI 2.4 without inotropes. Gentle volume resuscitation as needed, follow serial hemodynamics. Hypocarbic respiratory alkalosis, weaning minute ventilation for appropriate ventilation. Cardene for hypertension. Maintain pacing for appropriate hemodynamics. Making adequate urine at this time. CXR shows ETT ~ 5.5 cm above tereza, advanced to appropriate position.      Relevant Physical Exam:  Warm, well perfused extremities with 2+ pulses  Lungs clear, no wheezes bilaterally    Neuro: propofol/precedex for sedation in immediate post-op period. Wean when hemodynamically appropriate for SBT. Pain control w/ morphine vs. Apap vs. Oxy prn    CV: tissue AVR, maintain SBP <120 and MAP >65. Currently hypertensive, start cardene to achieve parameters. Follow hemodynamics, currently without the need for inotropes.   Rhythm: epicardial wires in place, A pacing at 70 bpm. H/o Afib, currently in sinus in the high 50s. Continue amio for now (started intra-op).    Pulm: maintain mechanical ventilation for now, adjust vent settings for appropriate oxygenation/ventilation. Hypocarbic respiratory alkalosis    Renal: appropriate UOP at this time, continue volume resuscitation as needed. Replete electrolytes.    GI: start bowel  regimen to avoid constipation/ileus. NPO for now, advance diet once appropriate. PPI for prophy.    Endo: stress induced hyperglycemia, A1C 5.2. Insulin drip for goal euglycemia, transition when appropriate    ID: periop cefazolin for prophylaxis    Heme: acute blood loss anemia, received 620ml cellsaver from OR. Mild thrombocytopenia with intra-op bleeding, 1unit platelets transfused. Follow chest tube output closely, transfuse as necessary. Start Lovenox POD#1 for prophy if no bleeding.    acetaminophen, 1,000 mg, Intravenous, Q8H  [START ON 2/6/2025] aspirin, 81 mg, Oral, Daily  atorvastatin, 40 mg, Oral, Nightly  ceFAZolin, 2,000 mg, Intravenous, Q8H  chlorhexidine, 15 mL, Mouth/Throat, Q12H  [START ON 2/6/2025] enoxaparin, 40 mg, Subcutaneous, Daily  magnesium sulfate, 2 g, Intravenous, Q8H  metoclopramide, 10 mg, Intravenous, Q6H  [START ON 2/6/2025] metoprolol tartrate, 12.5 mg, Oral, Q12H  mupirocin, 1 Application, Each Nare, BID  pantoprazole, 40 mg, Intravenous, Once   Followed by  [START ON 2/6/2025] pantoprazole, 40 mg, Oral, QAM  [START ON 2/6/2025] senna-docusate sodium, 2 tablet, Oral, Nightly        ------------------------------------------------------------------------------------------------------------------------------------------------------  Prophy: HOB elevated + oral care + scds + r[place] stat lock + PPI + subglottic suction + no chemical DVT prophy 2/2 risk for bleeding in immediate post-op period  Code Status: full      Critical Care time: 33 mins on 2/5 by Nia Hinds MD for the assessment and treatment of: interpretation of serial cardiac output measurements, evaluation of CXR, interpretation of serial blood gases, ventilator management, epicardial pacing for bradycardia

## 2025-02-05 NOTE — ANESTHESIA PROCEDURE NOTES
Central Line      Patient reassessed immediately prior to procedure    Patient location during procedure: pre-op  Indications: central pressure monitoring and vascular access  Staff  Anesthesiologist: Norman Cahng MD  Preanesthetic Checklist  Completed: patient identified, IV checked, site marked, risks and benefits discussed, surgical consent, monitors and equipment checked, pre-op evaluation and timeout performed  Central Line Prep  Sterile Tech:cap, gloves, gown, mask and sterile barriers  Prep: chloraprep  Patient monitoring: blood pressure monitoring, continuous pulse oximetry and EKG  Central Line Procedure  Location:internal jugular  Catheter Type:[place]  Assessment  Post procedure:line sutured, occlusive dressing applied and biopatch applied  Assessement:blood return through all ports, free fluid flow, chest x-ray ordered and Kris Test  Complications:no  Patient Tolerance:patient tolerated the procedure well with no apparent complications  Additional Notes  PAC inserted through MAC introducer port w/ sterile technique maintained. Balloon inflated w/ PAC at 20 cm. PAC advanced gently until PA waveform encountered. Balloon deflated and PAC locked at 57 cm. Pt tolerated the procedure well with no apparentcomplications.

## 2025-02-05 NOTE — ANESTHESIA PROCEDURE NOTES
Central Line      Patient reassessed immediately prior to procedure    Patient location during procedure: pre-op  Indications: central pressure monitoring and vascular access  Staff  Anesthesiologist: Norman Chang MD  Preanesthetic Checklist  Completed: patient identified, IV checked, site marked, risks and benefits discussed, surgical consent, monitors and equipment checked, pre-op evaluation and timeout performed  Central Line Prep  Sterile Tech:cap, gloves, gown, mask and sterile barriers  Prep: chloraprep  Patient monitoring: blood pressure monitoring, continuous pulse oximetry and EKG  Central Line Procedure  Laterality:right  Location:internal jugular  Catheter Type:MAC  Catheter Size:9 Fr  Guidance:ultrasound guided  PROCEDURE NOTE/ULTRASOUND INTERPRETATION.  Using ultrasound guidance the potential vascular sites for insertion of the catheter were visualized to determine the patency of the vessel to be used for vascular access.  After selecting the appropriate site for insertion, the needle was visualized under ultrasound being inserted into the internal jugular vein, followed by ultrasound confirmation of wire and catheter placement. There were no abnormalities seen on ultrasound; an image was taken; and the patient tolerated the procedure with no complications. Images: still images obtained, printed/placed on chart  Assessment  Post procedure:line sutured, occlusive dressing applied and biopatch applied  Assessement:blood return through all ports, free fluid flow, chest x-ray ordered and Kris Test  Complications:no  Patient Tolerance:patient tolerated the procedure well with no apparent complications  Additional Notes  Ultrasound guidance used for visualization of pertinent anatomy and confirmation of needle/wire positioning.

## 2025-02-05 NOTE — ANESTHESIA POSTPROCEDURE EVALUATION
Patient: Roney Kuhn    Procedure Summary       Date: 02/05/25 Room / Location: [place] [address] / [facility] CARDIOVASCULAR OPERATING ROOM    Anesthesia Start: 1156 Anesthesia Stop: 1507    Procedure: AORTIC VALVE REPLACEMENT; LEFT ATRIAL APPENDAGE EXCLUSION WITH ATRICLIP; PRP WITH TRANSESOPHAGEAL ECHOCARDIOGRAM (Chest) Diagnosis:       Persistent atrial fibrillation      (Persistent atrial fibrillation [I48.19])    Surgeons: Prasanth Martinez MD Provider: Eduardo Shaw MD    Anesthesia Type: general, [place], CVL, PAC ASA Status: 4            Anesthesia Type: general, [place], CVL, PAC    Vitals  Vitals Value Taken Time   /83 02/05/25 1505   Temp     Pulse 80 02/05/25 1506   Resp 15 02/05/25 1505   SpO2 100 % 02/05/25 1506   Vitals shown include unfiled device data.        Post Anesthesia Care and Evaluation    Patient location during evaluation: bedside  Pain management: adequate    Airway patency: patent  Anesthetic complications: No anesthetic complications    Cardiovascular status: acceptable  Respiratory status: acceptable  Hydration status: acceptable

## 2025-02-05 NOTE — ANESTHESIA PREPROCEDURE EVALUATION
Anesthesia Evaluation     Patient summary reviewed and Nursing notes reviewed   NPO Solid Status: > 6 hours  NPO Liquid Status: > 2 hours           Airway   Mallampati: II  TM distance: >3 FB  Neck ROM: full  Dental - normal exam     Pulmonary    (-) COPD, asthma, not a smoker  Cardiovascular     ECG reviewed    (+) hypertension, valvular problems/murmurs AI, dysrhythmias Atrial Fib, hyperlipidemia  (-) past MI, angina    ROS comment: Preop eval:    PFT nl    Carotid US <50% stenosis bilateraly    LHC mild, nonobstructive CAD    MICHELLE nl LV/RV fxn, severe AI, no other significant valve issues    Eliquis held preoperatively    Neuro/Psych  (-) seizures, CVA  GI/Hepatic/Renal/Endo    (+) renal disease- stones  (-) GERD, liver disease, diabetes, no thyroid disorder    Musculoskeletal     Abdominal    Substance History      OB/GYN          Other   arthritis,                 Anesthesia Plan    ASA 4     general, [place], CVL and PAC       Anesthetic plan, risks, benefits, and alternatives have been provided, discussed and informed consent has been obtained with: patient.    CODE STATUS:

## 2025-02-05 NOTE — ANESTHESIA PROCEDURE NOTES
Airway  Date/Time: 2/5/2025 12:12 PM  Airway not difficult    General Information and Staff    Patient location during procedure: OR  Anesthesiologist: Eduardo Shaw MD    Indications and Patient Condition    Preoxygenated: yes  Mask difficulty assessment: 2 - vent by mask + OA or adjuvant +/- NMBA    Final Airway Details  Final airway type: endotracheal airway      Successful airway: ETT  Cuffed: yes   Successful intubation technique: direct laryngoscopy  Facilitating devices/methods: intubating stylet  Endotracheal tube insertion site: oral  Blade: Gonzalez  Blade size: 3  ETT size (mm): 8.0  Cormack-Lehane Classification: grade IIa - partial view of glottis  Placement verified by: capnometry   Measured from: teeth  ETT/EBT  to teeth (cm): 23  Number of attempts at approach: 1  Assessment: lips, teeth, and gum same as pre-op and atraumatic intubation

## 2025-02-05 NOTE — ANESTHESIA PROCEDURE NOTES
Arterial Line      Patient reassessed immediately prior to procedure    Patient location during procedure: pre-op   Line placed for hemodynamic monitoring.  Performed By   Anesthesiologist: Norman Chang MD   Preanesthetic Checklist  Completed: patient identified, IV checked, site marked, risks and benefits discussed, surgical consent, monitors and equipment checked, pre-op evaluation and timeout performed  Arterial Line Prep    Sterile Tech: cap, gloves, mask and sterile barriers  Prep: ChloraPrep  Patient monitoring: blood pressure monitoring, continuous pulse oximetry and EKG  Arterial Line Procedure   Laterality:left  Location:  radial artery  Catheter size: 20 G   Guidance: ultrasound guided  PROCEDURE NOTE/ULTRASOUND INTERPRETATION.  Using ultrasound guidance the potential vascular sites for insertion of the catheter were visualized to determine the patency of the vessel to be used for vascular access.  After selecting the appropriate site for insertion, the needle was visualized under ultrasound being inserted into the radial artery, followed by ultrasound confirmation of wire and catheter placement. There were no abnormalities seen on ultrasound; an image was taken; and the patient tolerated the procedure with no complications.   Number of attempts: 1  Successful placement: yes Images: still images not obtained  Post Assessment   Dressing Type: occlusive dressing applied, secured with tape and wrist guard applied.   Complications no  Circ/Move/Sens Assessment: normal and unchanged.   Patient Tolerance: patient tolerated the procedure well with no apparent complications  Additional Notes  Ultrasound guidance used for visualization of pertinent anatomy and confirmation of needle positioning.

## 2025-02-05 NOTE — ANESTHESIA PROCEDURE NOTES
Diagnostic IntraOp Thad    Procedure Performed: Diagnostic IntraOp Thad       Start Time:  2/5/2025 1:00 PM       End Time:   2/5/2025 1:15 PM      General Procedure Information  Physician Requesting Echo: Prasanth Martinez MD  Location performed:  OR  Intubated  Bite block placed  Heart visualized  Probe Insertion:  Easy  Probe Type:  Multiplane  Modalities:  Color flow mapping, continuous wave Doppler and pulse wave Doppler    Echocardiographic and Doppler Measurements    Ventricles    Right Ventricle:  Cavity size normal.    Left Ventricle:  Cavity size dilated.  Global Function normal.  Ejection Fraction 55%.          Valves    Aortic Valve:  Annulus dilated.  Stenosis not present.  Regurgitation severe.  Leaflets normal.  Leaflet motions normal.      Mitral Valve:  Annulus normal.  Regurgitation trace.      Tricuspid Valve:  Annulus normal.  Regurgitation trace.        Aorta    Ascending Aorta:  Size normal.    Aortic Arch:  Size normal.    Descending Aorta:  Size normal.        Atria      Left Atrium:  Size normal.  Left atrial appendage normal.                  Anesthesia Information      Echocardiogram Comments:       Diagosis: non-rheumatic mitral regurgitation    After CPB, there was a bioprosthetic valve in aortic position, all leaflets moving well, no paravalvular leak, mean gradient 5 mm Hg

## 2025-02-06 ENCOUNTER — APPOINTMENT (OUTPATIENT)
Dept: GENERAL RADIOLOGY | Facility: HOSPITAL | Age: 75
DRG: 220 | End: 2025-02-06
Payer: MEDICARE

## 2025-02-06 LAB
ALBUMIN SERPL-MCNC: 3.5 G/DL (ref 3.5–5.2)
ANION GAP SERPL CALCULATED.3IONS-SCNC: 10.6 MMOL/L (ref 5–15)
BASOPHILS # BLD AUTO: 0.01 10*3/MM3 (ref 0–0.2)
BASOPHILS NFR BLD AUTO: 0.1 % (ref 0–1.5)
BH BB BLOOD EXPIRATION DATE: NORMAL
BH BB BLOOD TYPE BARCODE: 6200
BH BB DISPENSE STATUS: NORMAL
BH BB PRODUCT CODE: NORMAL
BH BB UNIT NUMBER: NORMAL
BUN SERPL-MCNC: 15 MG/DL (ref 8–23)
BUN/CREAT SERPL: 18.1 (ref 7–25)
CA-I SERPL ISE-MCNC: 1.14 MMOL/L (ref 1.15–1.35)
CALCIUM SPEC-SCNC: 8 MG/DL (ref 8.6–10.5)
CHLORIDE SERPL-SCNC: 109 MMOL/L (ref 98–107)
CO2 SERPL-SCNC: 22.4 MMOL/L (ref 22–29)
CREAT SERPL-MCNC: 0.83 MG/DL (ref 0.76–1.27)
DEPRECATED RDW RBC AUTO: 43.2 FL (ref 37–54)
EGFRCR SERPLBLD CKD-EPI 2021: 90.1 ML/MIN/1.73
EOSINOPHIL # BLD AUTO: 0 10*3/MM3 (ref 0–0.4)
EOSINOPHIL NFR BLD AUTO: 0 % (ref 0.3–6.2)
ERYTHROCYTE [DISTWIDTH] IN BLOOD BY AUTOMATED COUNT: 13 % (ref 12.3–15.4)
GLUCOSE BLDC GLUCOMTR-MCNC: 145 MG/DL (ref 70–130)
GLUCOSE BLDC GLUCOMTR-MCNC: 146 MG/DL (ref 70–130)
GLUCOSE BLDC GLUCOMTR-MCNC: 163 MG/DL (ref 70–130)
GLUCOSE BLDC GLUCOMTR-MCNC: 169 MG/DL (ref 70–130)
GLUCOSE BLDC GLUCOMTR-MCNC: 172 MG/DL (ref 70–130)
GLUCOSE SERPL-MCNC: 175 MG/DL (ref 65–99)
HCT VFR BLD AUTO: 35.2 % (ref 37.5–51)
HGB BLD-MCNC: 12.3 G/DL (ref 13–17.7)
IMM GRANULOCYTES # BLD AUTO: 0.05 10*3/MM3 (ref 0–0.05)
IMM GRANULOCYTES NFR BLD AUTO: 0.6 % (ref 0–0.5)
INR PPP: 1.23 (ref 0.9–1.1)
LYMPHOCYTES # BLD AUTO: 0.31 10*3/MM3 (ref 0.7–3.1)
LYMPHOCYTES NFR BLD AUTO: 3.4 % (ref 19.6–45.3)
MAGNESIUM SERPL-MCNC: 2.5 MG/DL (ref 1.6–2.4)
MCH RBC QN AUTO: 32.4 PG (ref 26.6–33)
MCHC RBC AUTO-ENTMCNC: 34.9 G/DL (ref 31.5–35.7)
MCV RBC AUTO: 92.6 FL (ref 79–97)
MONOCYTES # BLD AUTO: 0.69 10*3/MM3 (ref 0.1–0.9)
MONOCYTES NFR BLD AUTO: 7.6 % (ref 5–12)
NEUTROPHILS NFR BLD AUTO: 7.96 10*3/MM3 (ref 1.7–7)
NEUTROPHILS NFR BLD AUTO: 88.3 % (ref 42.7–76)
PHOSPHATE SERPL-MCNC: 3.4 MG/DL (ref 2.5–4.5)
PLATELET # BLD AUTO: 160 10*3/MM3 (ref 140–450)
PMV BLD AUTO: 10.1 FL (ref 6–12)
POTASSIUM SERPL-SCNC: 3.8 MMOL/L (ref 3.5–5.2)
PROTHROMBIN TIME: 15.4 SECONDS (ref 11.7–14.2)
QT INTERVAL: 395 MS
QT INTERVAL: 455 MS
QTC INTERVAL: 418 MS
QTC INTERVAL: 472 MS
RBC # BLD AUTO: 3.8 10*6/MM3 (ref 4.14–5.8)
SODIUM SERPL-SCNC: 142 MMOL/L (ref 136–145)
UNIT  ABO: NORMAL
UNIT  RH: NORMAL
WBC NRBC COR # BLD AUTO: 9.02 10*3/MM3 (ref 3.4–10.8)

## 2025-02-06 PROCEDURE — 93010 ELECTROCARDIOGRAM REPORT: CPT | Performed by: INTERNAL MEDICINE

## 2025-02-06 PROCEDURE — 63710000001 INSULIN LISPRO (HUMAN) PER 5 UNITS: Performed by: NURSE PRACTITIONER

## 2025-02-06 PROCEDURE — 85025 COMPLETE CBC W/AUTO DIFF WBC: CPT | Performed by: NURSE PRACTITIONER

## 2025-02-06 PROCEDURE — 25010000002 AMIODARONE IN DEXTROSE 5% 360-4.14 MG/200ML-% SOLUTION: Performed by: THORACIC SURGERY (CARDIOTHORACIC VASCULAR SURGERY)

## 2025-02-06 PROCEDURE — 99024 POSTOP FOLLOW-UP VISIT: CPT | Performed by: NURSE PRACTITIONER

## 2025-02-06 PROCEDURE — 93005 ELECTROCARDIOGRAM TRACING: CPT | Performed by: ANESTHESIOLOGY

## 2025-02-06 PROCEDURE — 25010000002 ACETAMINOPHEN 10 MG/ML SOLUTION: Performed by: NURSE PRACTITIONER

## 2025-02-06 PROCEDURE — 85610 PROTHROMBIN TIME: CPT | Performed by: NURSE PRACTITIONER

## 2025-02-06 PROCEDURE — 25010000002 MAGNESIUM SULFATE 2 GM/50ML SOLUTION: Performed by: NURSE PRACTITIONER

## 2025-02-06 PROCEDURE — 80069 RENAL FUNCTION PANEL: CPT | Performed by: NURSE PRACTITIONER

## 2025-02-06 PROCEDURE — 97162 PT EVAL MOD COMPLEX 30 MIN: CPT

## 2025-02-06 PROCEDURE — 25010000002 METOCLOPRAMIDE PER 10 MG: Performed by: NURSE PRACTITIONER

## 2025-02-06 PROCEDURE — 25010000002 NICARDIPINE HCL IN NACL 20-0.9 MG/200ML-% SOLUTION: Performed by: NURSE PRACTITIONER

## 2025-02-06 PROCEDURE — 82330 ASSAY OF CALCIUM: CPT | Performed by: NURSE PRACTITIONER

## 2025-02-06 PROCEDURE — 93005 ELECTROCARDIOGRAM TRACING: CPT | Performed by: THORACIC SURGERY (CARDIOTHORACIC VASCULAR SURGERY)

## 2025-02-06 PROCEDURE — 71045 X-RAY EXAM CHEST 1 VIEW: CPT

## 2025-02-06 PROCEDURE — 82948 REAGENT STRIP/BLOOD GLUCOSE: CPT

## 2025-02-06 PROCEDURE — 25010000002 ENOXAPARIN PER 10 MG: Performed by: NURSE PRACTITIONER

## 2025-02-06 PROCEDURE — 99222 1ST HOSP IP/OBS MODERATE 55: CPT | Performed by: INTERNAL MEDICINE

## 2025-02-06 PROCEDURE — 25010000002 CEFAZOLIN PER 500 MG: Performed by: NURSE PRACTITIONER

## 2025-02-06 PROCEDURE — 83735 ASSAY OF MAGNESIUM: CPT | Performed by: NURSE PRACTITIONER

## 2025-02-06 PROCEDURE — 25010000002 POTASSIUM CHLORIDE PER 2 MEQ: Performed by: THORACIC SURGERY (CARDIOTHORACIC VASCULAR SURGERY)

## 2025-02-06 PROCEDURE — 97530 THERAPEUTIC ACTIVITIES: CPT

## 2025-02-06 RX ORDER — GUAIFENESIN 600 MG/1
1200 TABLET, EXTENDED RELEASE ORAL EVERY 12 HOURS SCHEDULED
Status: DISCONTINUED | OUTPATIENT
Start: 2025-02-06 | End: 2025-02-09 | Stop reason: HOSPADM

## 2025-02-06 RX ORDER — GABAPENTIN 100 MG/1
100 CAPSULE ORAL EVERY 12 HOURS SCHEDULED
Status: DISCONTINUED | OUTPATIENT
Start: 2025-02-06 | End: 2025-02-09 | Stop reason: HOSPADM

## 2025-02-06 RX ORDER — DEXTROSE MONOHYDRATE 25 G/50ML
25 INJECTION, SOLUTION INTRAVENOUS
Status: DISCONTINUED | OUTPATIENT
Start: 2025-02-06 | End: 2025-02-09 | Stop reason: HOSPADM

## 2025-02-06 RX ORDER — IBUPROFEN 600 MG/1
1 TABLET ORAL
Status: DISCONTINUED | OUTPATIENT
Start: 2025-02-06 | End: 2025-02-09 | Stop reason: HOSPADM

## 2025-02-06 RX ORDER — HYDRALAZINE HYDROCHLORIDE 20 MG/ML
20 INJECTION INTRAMUSCULAR; INTRAVENOUS EVERY 6 HOURS PRN
Status: DISCONTINUED | OUTPATIENT
Start: 2025-02-06 | End: 2025-02-09 | Stop reason: HOSPADM

## 2025-02-06 RX ORDER — TRAZODONE HYDROCHLORIDE 50 MG/1
50 TABLET, FILM COATED ORAL NIGHTLY PRN
Status: DISCONTINUED | OUTPATIENT
Start: 2025-02-06 | End: 2025-02-09 | Stop reason: HOSPADM

## 2025-02-06 RX ORDER — POTASSIUM CHLORIDE 29.8 MG/ML
20 INJECTION INTRAVENOUS ONCE
Status: COMPLETED | OUTPATIENT
Start: 2025-02-06 | End: 2025-02-06

## 2025-02-06 RX ORDER — INSULIN LISPRO 100 [IU]/ML
2-7 INJECTION, SOLUTION INTRAVENOUS; SUBCUTANEOUS
Status: DISCONTINUED | OUTPATIENT
Start: 2025-02-06 | End: 2025-02-09 | Stop reason: HOSPADM

## 2025-02-06 RX ORDER — METOPROLOL TARTRATE 25 MG/1
25 TABLET, FILM COATED ORAL EVERY 12 HOURS SCHEDULED
Status: DISCONTINUED | OUTPATIENT
Start: 2025-02-06 | End: 2025-02-07

## 2025-02-06 RX ORDER — NICOTINE POLACRILEX 4 MG
15 LOZENGE BUCCAL
Status: DISCONTINUED | OUTPATIENT
Start: 2025-02-06 | End: 2025-02-09 | Stop reason: HOSPADM

## 2025-02-06 RX ADMIN — 0.12% CHLORHEXIDINE GLUCONATE 15 ML: 1.2 RINSE ORAL at 04:46

## 2025-02-06 RX ADMIN — ATORVASTATIN CALCIUM 40 MG: 20 TABLET, FILM COATED ORAL at 21:30

## 2025-02-06 RX ADMIN — ALPRAZOLAM 0.25 MG: 0.25 TABLET ORAL at 21:30

## 2025-02-06 RX ADMIN — CEFAZOLIN 2000 MG: 2 INJECTION, POWDER, FOR SOLUTION INTRAMUSCULAR; INTRAVENOUS at 21:44

## 2025-02-06 RX ADMIN — METOCLOPRAMIDE 10 MG: 5 INJECTION, SOLUTION INTRAMUSCULAR; INTRAVENOUS at 04:46

## 2025-02-06 RX ADMIN — OXYCODONE HYDROCHLORIDE 10 MG: 10 TABLET ORAL at 18:46

## 2025-02-06 RX ADMIN — ACETAMINOPHEN 1000 MG: 10 INJECTION INTRAVENOUS at 00:01

## 2025-02-06 RX ADMIN — INSULIN LISPRO 2 UNITS: 100 INJECTION, SOLUTION INTRAVENOUS; SUBCUTANEOUS at 09:08

## 2025-02-06 RX ADMIN — PANTOPRAZOLE SODIUM 40 MG: 40 TABLET, DELAYED RELEASE ORAL at 09:06

## 2025-02-06 RX ADMIN — GABAPENTIN 100 MG: 100 CAPSULE ORAL at 09:07

## 2025-02-06 RX ADMIN — MUPIROCIN 1 APPLICATION: 20 OINTMENT TOPICAL at 21:31

## 2025-02-06 RX ADMIN — METOPROLOL TARTRATE 25 MG: 25 TABLET, FILM COATED ORAL at 21:30

## 2025-02-06 RX ADMIN — OXYCODONE HYDROCHLORIDE 10 MG: 10 TABLET ORAL at 06:05

## 2025-02-06 RX ADMIN — CYCLOBENZAPRINE HYDROCHLORIDE 10 MG: 10 TABLET, FILM COATED ORAL at 21:29

## 2025-02-06 RX ADMIN — POTASSIUM CHLORIDE 20 MEQ: 29.8 INJECTION, SOLUTION INTRAVENOUS at 04:46

## 2025-02-06 RX ADMIN — MAGNESIUM SULFATE IN WATER FOR 2 G: 40 INJECTION INTRAVENOUS at 00:01

## 2025-02-06 RX ADMIN — NICARDIPINE HYDROCHLORIDE 3 MG/HR: 0.1 INJECTION INTRAVENOUS at 05:58

## 2025-02-06 RX ADMIN — METOCLOPRAMIDE 10 MG: 5 INJECTION, SOLUTION INTRAMUSCULAR; INTRAVENOUS at 09:06

## 2025-02-06 RX ADMIN — MUPIROCIN 1 APPLICATION: 20 OINTMENT TOPICAL at 09:07

## 2025-02-06 RX ADMIN — GUAIFENESIN 1200 MG: 600 TABLET, MULTILAYER, EXTENDED RELEASE ORAL at 21:30

## 2025-02-06 RX ADMIN — INSULIN LISPRO 2 UNITS: 100 INJECTION, SOLUTION INTRAVENOUS; SUBCUTANEOUS at 21:31

## 2025-02-06 RX ADMIN — ENOXAPARIN SODIUM 40 MG: 100 INJECTION SUBCUTANEOUS at 18:20

## 2025-02-06 RX ADMIN — GUAIFENESIN 1200 MG: 600 TABLET, MULTILAYER, EXTENDED RELEASE ORAL at 09:06

## 2025-02-06 RX ADMIN — ACETAMINOPHEN 650 MG: 325 TABLET, FILM COATED ORAL at 21:30

## 2025-02-06 RX ADMIN — AMIODARONE HYDROCHLORIDE 0.5 MG/MIN: 1.8 INJECTION, SOLUTION INTRAVENOUS at 02:20

## 2025-02-06 RX ADMIN — ACETAMINOPHEN 1000 MG: 10 INJECTION INTRAVENOUS at 09:06

## 2025-02-06 RX ADMIN — GABAPENTIN 100 MG: 100 CAPSULE ORAL at 21:31

## 2025-02-06 RX ADMIN — SENNOSIDES AND DOCUSATE SODIUM 2 TABLET: 50; 8.6 TABLET ORAL at 21:30

## 2025-02-06 RX ADMIN — 0.12% CHLORHEXIDINE GLUCONATE 15 ML: 1.2 RINSE ORAL at 15:36

## 2025-02-06 RX ADMIN — ASPIRIN 81 MG: 81 TABLET, COATED ORAL at 09:07

## 2025-02-06 RX ADMIN — AMIODARONE HYDROCHLORIDE 0.5 MG/MIN: 1.8 INJECTION, SOLUTION INTRAVENOUS at 01:02

## 2025-02-06 RX ADMIN — CEFAZOLIN 2000 MG: 2 INJECTION, POWDER, FOR SOLUTION INTRAMUSCULAR; INTRAVENOUS at 06:30

## 2025-02-06 RX ADMIN — CEFAZOLIN 2000 MG: 2 INJECTION, POWDER, FOR SOLUTION INTRAMUSCULAR; INTRAVENOUS at 15:36

## 2025-02-06 RX ADMIN — METOPROLOL TARTRATE 25 MG: 25 TABLET, FILM COATED ORAL at 09:06

## 2025-02-06 NOTE — CONSULTS
Patient Name: Roney Kuhn  :1950  75 y.o.    Date of Admission: 2025  Date of Consultation:  25  Encounter Provider: Gurvinder Tapia III, MD  Place of Service: [facility] CARDIOLOGY  Referring Provider: Prasanth Martinez MD  Patient Care Team:  Lizz Wiggins MD as PCP - General (Family Medicine)      Chief complaint: Persistent A-fib    History of Present Illness:     Roney Kuhn is a 75 year old pt of Dr. Mi with a history of atrial fibrillation, aortic valve insufficiency, hypertension, hyperlipidemia, cardiomyopathy, aortic root dilation, right lung nodule and BPH.     Patient presented to [facility] on 2025 for a tissue AVR plus ROBLES clip by Dr. Martinez.  Patient admitted to CVICU.  We have been asked to see him today in consultation.  He is currently sitting in a chair, says he feels really good.  A little bit of discomfort but that said.  No shortness of breath.  No dizziness.  He walked earlier today for short distance, he is looking forward to doing more.  He said his only question was how long was he going to be in the hospital.    In April patient was on vacation in [facility] and had an aggressive cough.  He was seen at the urgent care and ultimately treated for pneumonia and was told he had atrial fibrillation.  An echo showed ejection fraction of 45-50% and dilated left ventricle, aortic root measuring 4.3 cm, mild mitral valve regurgitation, mild to moderate tricuspid valve regurgitation, mildly dilated left atrium, mildly elevated pulmonary pressure 35 mmHg,  In May 2024 patient had successful external cardioversion.    He then maintained normal sinus rhythm.  He had transesophageal echocardiogram 2024 which showed dilated LV cavity with an EF of 56-60% and severe trileaflet aortic valve regurgitation with poor coaptation between the right and left coronary cusp.  He then was sent to Dr. Martinez and  surgical intervention was recommended.  He had cardiac catheterization 11/20/2024 which showed 10-20% coronary artery disease.  He had preoperative PFT 11/21/2024.        CATH 11/20/24    CONCLUSION:  1.  No significant coronary atherosclerotic disease.        RECOMMENDATIONS:  Continue with surgical evaluation for severe aortic valve regurgitation as planned.    ECHO 9/3/24      Left ventricular ejection fraction appears to be 56 - 60%.    The left ventricular cavity is dilated.    Saline test results are negative.    Severe aortic valve regurgitation is present.  The aortic valve is trileaflet.  There is poor coaptation between particularly the right and left coronary cusps.    Past Medical History:   Diagnosis Date    Aortic root dilation     Aortic valve insufficiency     Arthritis     Atrial fibrillation     Back problem     Basal cell carcinoma     FACE AND LT EAR    Cardiomyopathy     EF 45-50 % in 04/2024 on scanned media    Claustrophobia     Hyperlipidemia     Hypertension     Kidney stones 2021       Past Surgical History:   Procedure Laterality Date    AORTIC VALVE REPAIR/REPLACEMENT N/A 2/5/2025    Procedure: AORTIC VALVE REPLACEMENT; LEFT ATRIAL APPENDAGE EXCLUSION WITH ATRICLIP; PRP WITH TRANSESOPHAGEAL ECHOCARDIOGRAM;  Surgeon: Prasanth Martinez MD;  Location: [facility] CVOR;  Service: Cardiothoracic;  Laterality: N/A;    APPENDECTOMY  1980    BASAL CELL CARCINOMA EXCISION      REMOVED FROM FACE   LOCAL    CARDIAC CATHETERIZATION N/A 11/20/2024    Procedure: Left Heart Cath;  Surgeon: Deisy Keller MD;  Location: [facility] CATH INVASIVE LOCATION;  Service: Cardiovascular;  Laterality: N/A;  preop- aortic valve surgery- rec from Dr. Martinez    CARDIAC CATHETERIZATION N/A 11/20/2024    Procedure: Coronary angiography;  Surgeon: Deisy Keller MD;  Location: [facility] CATH INVASIVE LOCATION;  Service: Cardiovascular;  Laterality: N/A;    CATARACT EXTRACTION W/ INTRAOCULAR LENS IMPLANT Bilateral 2015     COLONOSCOPY  2017    Date unknown    INGUINAL HERNIA REPAIR Bilateral     KIDNEY STONE SURGERY  2021    KNEE ARTHROSCOPY W/ MENISCAL REPAIR Right 2014    ROTATOR CUFF REPAIR Right 2009    WISDOM TOOTH EXTRACTION           Prior to Admission medications    Medication Sig Start Date End Date Taking? Authorizing Provider   lisinopril-hydrochlorothiazide (PRINZIDE,ZESTORETIC) 20-12.5 MG per tablet Take 1 tablet by mouth Daily. 8/2/24  Yes Liss Yanez APRN   metoprolol tartrate (LOPRESSOR) 25 MG tablet Take 0.5 tablets by mouth 2 (Two) Times a Day.   Yes Lizz Wiggins MD   rosuvastatin (CRESTOR) 5 MG tablet Take 1 tablet by mouth Daily. 4/16/24  Yes Charo Parmar MD   traZODone (DESYREL) 50 MG tablet TAKE ONE TABLET BY MOUTH ONCE NIGHTLY  Patient taking differently: Take 1 tablet by mouth Every Night. 9/21/23  Yes Mikhail Wellington MD   benzonatate (TESSALON) 200 MG capsule Take 1 capsule by mouth 3 (Three) Times a Day As Needed for Cough. 12/7/24   Sumaya Og APRN   Eliquis 5 MG tablet tablet Take 1 tablet by mouth Every 12 (Twelve) Hours. 4/10/24   Tammy Mi MD       No Known Allergies    Social History     Socioeconomic History    Marital status:     Number of children: 3   Tobacco Use    Smoking status: Never     Passive exposure: Never    Smokeless tobacco: Never    Tobacco comments:     Caffeine - 2 cups coffee daily    Vaping Use    Vaping status: Never Used   Substance and Sexual Activity    Alcohol use: Yes     Alcohol/week: 8.0 standard drinks of alcohol     Types: 8 Drinks containing 0.5 oz of alcohol per week     Comment: weekly    Drug use: No    Sexual activity: Defer     Partners: Female     Birth control/protection: Vasectomy       Family History   Problem Relation Age of Onset    Arthritis Mother     Hypertension Father     Hyperlipidemia Father         blood clots    Arthritis Father     Clotting disorder Father     Heart attack Father     Heart disease Father          Heart Attack    Breast cancer Sister     Hypertension Brother     Vania Hyperthermia Neg Hx        REVIEW OF SYSTEMS:   All systems reviewed.  Pertinent positives identified in HPI.  All other systems are negative.      Objective:     Vitals:    02/06/25 0700 02/06/25 0800 02/06/25 1000 02/06/25 1100   BP:   110/65 123/99   Pulse: 86 72 61 64   Resp:       Temp: 97.9 °F (36.6 °C) 97.9 °F (36.6 °C)     TempSrc:       SpO2: 99% 100% 97% 99%     There is no height or weight on file to calculate BMI.    Physical Exam:  General Appearance:    Alert, cooperative, in no acute distress   Head:    Normocephalic, without obvious abnormality   Eyes:            Lids and lashes normal, conjunctivae and sclerae normal, no icterus, no pallor, corneas clear   Ears:    Ears appear intact with no abnormalities noted   Throat:   No oral lesions, oral mucosa moist   Neck:   No adenopathy, supple, trachea midline, no thyromegaly, no carotid bruit, no JVD   Back:     No kyphosis present, no erythema or scars, no tenderness to palpation    Lungs:     Clear to auscultation,respirations regular, even and unlabored    Heart:    Regular rhythm and normal rate, normal S1 and S2, 1/6 systolic murmur, no gallop, no rub, no click   Chest Wall:  Dressing in place   Abdomen:     Normal bowel sounds, no masses, no organomegaly, soft        non-tender, non-distended, no guarding   Extremities:   Moves all extremities well, no edema, no cyanosis, no redness   Pulses:  Bilateral carotids brisk   Skin:  Psychiatric:   No bleeding or rash    Alert and oriented, normal mood and affect         Lab Review:     Results from last 7 days   Lab Units 02/06/25  0330 02/05/25  1500 02/03/25  0742   SODIUM mmol/L 142   < > 137   POTASSIUM mmol/L 3.8   < > 3.7   CHLORIDE mmol/L 109*   < > 100   CO2 mmol/L 22.4   < > 26.6   BUN mg/dL 15   < > 20   CREATININE mg/dL 0.83   < > 0.93   CALCIUM mg/dL 8.0*   < > 9.2   BILIRUBIN mg/dL  --   --  0.3   ALK PHOS U/L  --   --   57   ALT (SGPT) U/L  --   --  13   AST (SGOT) U/L  --   --  19   GLUCOSE mg/dL 175*   < > 97    < > = values in this interval not displayed.         Results from last 7 days   Lab Units 02/06/25  0330   WBC 10*3/mm3 9.02   HEMOGLOBIN g/dL 12.3*   HEMATOCRIT % 35.2*   PLATELETS 10*3/mm3 160     Results from last 7 days   Lab Units 02/06/25  0330 02/05/25  1500 02/05/25  1404 02/03/25  0742   INR  1.23* 1.44* 1.62* 0.92   APTT seconds  --  30.6 28.5 27.8     Results from last 7 days   Lab Units 02/06/25  0330   MAGNESIUM mg/dL 2.5*     Results from last 7 days   Lab Units 02/03/25  0742   CHOLESTEROL mg/dL 176   TRIGLYCERIDES mg/dL 72   HDL CHOL mg/dL 87*   LDL CHOL mg/dL 76                               I personally viewed and interpreted the patient's EKG/Telemetry data.      Current Facility-Administered Medications:     acetaminophen (TYLENOL) tablet 650 mg, 650 mg, Oral, Q4H PRN **OR** acetaminophen (TYLENOL) 160 MG/5ML oral solution 650 mg, 650 mg, Oral, Q4H PRN **OR** acetaminophen (TYLENOL) suppository 650 mg, 650 mg, Rectal, Q4H PRN, Leah Barker APRN    albumin human 5 % solution 1,000 mL, 1,000 mL, Intravenous, PRN, Leah Barker APRN, 250 mL at 02/05/25 1818    ALPRAZolam (XANAX) tablet 0.25 mg, 0.25 mg, Oral, Q8H PRN, Leah Barker APRN    aspirin EC tablet 81 mg, 81 mg, Oral, Daily, Leah Barker APRN, 81 mg at 02/06/25 0907    atorvastatin (LIPITOR) tablet 40 mg, 40 mg, Oral, Nightly, Leah Barker APRN    bisacodyl (DULCOLAX) EC tablet 10 mg, 10 mg, Oral, Daily PRN, Leah Barker APRN    bisacodyl (DULCOLAX) suppository 10 mg, 10 mg, Rectal, Daily PRN, Leah Barker, APRN    ceFAZolin 2000 mg IVPB in 100 mL NS (MBP), 2,000 mg, Intravenous, Q8H, Leah Barker APRN, 2,000 mg at 02/06/25 0630    chlorhexidine (PERIDEX) 0.12 % solution 15 mL, 15 mL, Mouth/Throat, Q12H, Leah Barker APRN, 15 mL at 02/06/25 0446    cyclobenzaprine (FLEXERIL) tablet 10 mg, 10 mg, Oral, Q8H  PRN, Leah Barker APRN    dextrose (D50W) (25 g/50 mL) IV injection 25 g, 25 g, Intravenous, Q15 Min PRN, Leah Barker APRN    dextrose (GLUTOSE) oral gel 15 g, 15 g, Oral, Q15 Min PRN, Leah Barker APRN    Enoxaparin Sodium (LOVENOX) syringe 40 mg, 40 mg, Subcutaneous, Daily, Leah Barker APRN    gabapentin (NEURONTIN) capsule 100 mg, 100 mg, Oral, Q12H, Leah Barker APRN, 100 mg at 02/06/25 0907    glucagon (GLUCAGEN) injection 1 mg, 1 mg, Intramuscular, Q15 Min PRN, Leah Barker APRN    guaiFENesin (MUCINEX) 12 hr tablet 1,200 mg, 1,200 mg, Oral, Q12H, Leah Barker APRN, 1,200 mg at 02/06/25 0906    hydrALAZINE (APRESOLINE) injection 20 mg, 20 mg, Intravenous, Q6H PRN, Leah Barker APRN    HYDROcodone-acetaminophen (NORCO) 5-325 MG per tablet 2 tablet, 2 tablet, Oral, Q4H PRN, Leah Barker APRN    insulin lispro (HUMALOG/ADMELOG) injection 2-7 Units, 2-7 Units, Subcutaneous, 4x Daily AC & at Bedtime, Leah Barker APRN, 2 Units at 02/06/25 0908    magnesium hydroxide (MILK OF MAGNESIA) suspension 10 mL, 10 mL, Oral, Daily PRN, Leah Barker APRN    magnesium sulfate 2g/50 mL (PREMIX) infusion, 2 g, Intravenous, Q8H, Leah Barker APRN, 2 g at 02/06/25 0001    metoprolol tartrate (LOPRESSOR) tablet 25 mg, 25 mg, Oral, Q12H, Leah Barker APRN, 25 mg at 02/06/25 0906    morphine injection 1 mg, 1 mg, Intravenous, Q4H PRN **AND** naloxone (NARCAN) injection 0.4 mg, 0.4 mg, Intravenous, Q5 Min PRN, Leah Barker APRN    mupirocin (BACTROBAN) 2 % nasal ointment 1 Application, 1 Application, Each Nare, BID, Leah Barker APRN, 1 Application at 02/06/25 0907    niCARdipine (CARDENE) 20 mg in 200 mL NS infusion, 5-15 mg/hr, Intravenous, Continuous PRN, Leah Barker APRN, Last Rate: 20 mL/hr at 02/06/25 1001, 2 mg/hr at 02/06/25 1001    nitroglycerin (NITROSTAT) SL tablet 0.4 mg, 0.4 mg, Sublingual, Q5 Min PRN, Leah Barker APRN    ondansetron  (ZOFRAN) injection 4 mg, 4 mg, Intravenous, Q6H PRN, Leah Barker APRN    oxyCODONE (ROXICODONE) immediate release tablet 10 mg, 10 mg, Oral, Q4H PRN, Leah Barker APRN, 10 mg at 02/06/25 0605    [COMPLETED] pantoprazole (PROTONIX) injection 40 mg, 40 mg, Intravenous, Once, 40 mg at 02/05/25 1553 **FOLLOWED BY** pantoprazole (PROTONIX) EC tablet 40 mg, 40 mg, Oral, QAM, Leah Barker APRN, 40 mg at 02/06/25 0906    polyethylene glycol (MIRALAX) packet 17 g, 17 g, Oral, Daily PRN, Leah Barker APRN    Potassium Replacement - Follow Nurse / BPA Driven Protocol, , Not Applicable, PRN, Leah Barker APRN    sennosides-docusate (PERICOLACE) 8.6-50 MG per tablet 2 tablet, 2 tablet, Oral, Nightly, Leah Barker APRN    traZODone (DESYREL) tablet 50 mg, 50 mg, Oral, Nightly PRN, Nia Hinds MD    Facility-Administered Medications Ordered in Other Encounters:     Chlorhexidine Gluconate Cloth 2 % pads 1 Application, 1 Application, Topical, Q12H PRN, Tre Goodwin, PA-C    Assessment and Plan:       Active Hospital Problems    Diagnosis  POA    **Persistent atrial fibrillation [I48.19]  Unknown    Aortic stenosis [I35.0]  Yes      Resolved Hospital Problems   No resolved problems to display.     1.  Aortic valve insufficiency status post tissue AVR (29 mm magna ease pericardial prosthesis ) with left atrial appendage clip to 2/5/25  2.  Paroxysmal atrial fibrillation, has been on chronic anticoagulation as an outpatient.  Pacemaker currently in place, appears to be sinus underneath the pacemaker.  3.  Hypertension, follow    Gurvinder Tapia III, MD  02/06/25  12:18 EST

## 2025-02-06 NOTE — PROGRESS NOTES
" LOS: 1 day   Patient Care Team:  Lizz Wiggins MD as PCP - General (Family Medicine)    Chief Complaint: post op follow-up    Subjective  \"Couldn't breathe on the ventilator\"    Vital Signs  Temp:  [95.7 °F (35.4 °C)-98.6 °F (37 °C)] 98.2 °F (36.8 °C)  Heart Rate:  [49-86] 86  Resp:  [14-18] 16  BP: ()/(62-85) 96/68  Arterial Line BP: (9-252)/(-) 124/56  FiO2 (%):  [39 %-99 %] 39 %  There is no height or weight on file to calculate BMI.    Intake/Output Summary (Last 24 hours) at 2/6/2025 0711  Last data filed at 2/5/2025 2000  Gross per 24 hour   Intake 2666.78 ml   Output 3825 ml   Net -1158.22 ml     No intake/output data recorded.    Chest tube drainage last 8 hours     There were no vitals filed for this visit.      Objective:  Vital signs: (most recent): Blood pressure 96/68, pulse 86, temperature 98.2 °F (36.8 °C), resp. rate 16, SpO2 99%.                     Results Review:        WBC WBC   Date Value Ref Range Status   02/06/2025 9.02 3.40 - 10.80 10*3/mm3 Final   02/05/2025 11.41 (H) 3.40 - 10.80 10*3/mm3 Final   02/05/2025 9.18 3.40 - 10.80 10*3/mm3 Final   02/05/2025 6.01 3.40 - 10.80 10*3/mm3 Final   02/03/2025 3.49 3.40 - 10.80 10*3/mm3 Final      HGB Hemoglobin   Date Value Ref Range Status   02/06/2025 12.3 (L) 13.0 - 17.7 g/dL Final   02/05/2025 13.5 13.0 - 17.7 g/dL Final   02/05/2025 12.4 (L) 13.0 - 17.7 g/dL Final   02/05/2025 11.0 (L) 13.0 - 17.7 g/dL Final   02/03/2025 15.0 13.0 - 17.7 g/dL Final      HCT Hematocrit   Date Value Ref Range Status   02/06/2025 35.2 (L) 37.5 - 51.0 % Final   02/05/2025 38.0 37.5 - 51.0 % Final   02/05/2025 36.0 (L) 37.5 - 51.0 % Final   02/05/2025 31.5 (L) 37.5 - 51.0 % Final   02/03/2025 42.8 37.5 - 51.0 % Final      Platelets Platelets   Date Value Ref Range Status   02/06/2025 160 140 - 450 10*3/mm3 Final   02/05/2025 147 140 - 450 10*3/mm3 Final   02/05/2025 147 140 - 450 10*3/mm3 Final   02/05/2025 101 (L) 140 - 450 10*3/mm3 Final "   02/05/2025 154 140 - 450 10*3/mm3 Final   02/03/2025 170 140 - 450 10*3/mm3 Final        PT/INR:    Protime   Date Value Ref Range Status   02/06/2025 15.4 (H) 11.7 - 14.2 Seconds Final   02/05/2025 17.6 (H) 11.7 - 14.2 Seconds Final   02/05/2025 19.3 (H) 11.7 - 14.2 Seconds Final   02/03/2025 12.5 11.7 - 14.2 Seconds Final   /  INR   Date Value Ref Range Status   02/06/2025 1.23 (H) 0.90 - 1.10 Final   02/05/2025 1.44 (H) 0.90 - 1.10 Final   02/05/2025 1.62 (H) 0.90 - 1.10 Final   02/03/2025 0.92 0.90 - 1.10 Final       Sodium Sodium   Date Value Ref Range Status   02/06/2025 142 136 - 145 mmol/L Final   02/05/2025 140 136 - 145 mmol/L Final   02/05/2025 139 136 - 145 mmol/L Final   02/03/2025 137 136 - 145 mmol/L Final      Potassium Potassium   Date Value Ref Range Status   02/06/2025 3.8 3.5 - 5.2 mmol/L Final   02/05/2025 3.7 3.5 - 5.2 mmol/L Final   02/05/2025 3.6 3.5 - 5.2 mmol/L Final   02/05/2025 3.9 3.5 - 5.2 mmol/L Final   02/03/2025 3.7 3.5 - 5.2 mmol/L Final      Chloride Chloride   Date Value Ref Range Status   02/06/2025 109 (H) 98 - 107 mmol/L Final   02/05/2025 107 98 - 107 mmol/L Final   02/05/2025 104 98 - 107 mmol/L Final   02/03/2025 100 98 - 107 mmol/L Final      Bicarbonate CO2   Date Value Ref Range Status   02/06/2025 22.4 22.0 - 29.0 mmol/L Final   02/05/2025 20.0 (L) 22.0 - 29.0 mmol/L Final   02/05/2025 22.2 22.0 - 29.0 mmol/L Final   02/03/2025 26.6 22.0 - 29.0 mmol/L Final      BUN BUN   Date Value Ref Range Status   02/06/2025 15 8 - 23 mg/dL Final   02/05/2025 15 8 - 23 mg/dL Final   02/05/2025 16 8 - 23 mg/dL Final   02/03/2025 20 8 - 23 mg/dL Final      Creatinine Creatinine   Date Value Ref Range Status   02/06/2025 0.83 0.76 - 1.27 mg/dL Final   02/05/2025 0.82 0.76 - 1.27 mg/dL Final   02/05/2025 0.94 0.76 - 1.27 mg/dL Final   02/03/2025   Corrected     Comment:     Removed from EpIC.  Test not ordered  Nilam Ricketts, RRT, CPFT  2/3/2025  08:27 EST  Corrected result. Previous  result was 0.83 mg/dL on 2/3/2025 at 0816 EST.   02/03/2025 0.93 0.76 - 1.27 mg/dL Final      Calcium Calcium   Date Value Ref Range Status   02/06/2025 8.0 (L) 8.6 - 10.5 mg/dL Final   02/05/2025 8.5 (L) 8.6 - 10.5 mg/dL Final   02/05/2025 7.7 (L) 8.6 - 10.5 mg/dL Final   02/03/2025 9.2 8.6 - 10.5 mg/dL Final      Magnesium Magnesium   Date Value Ref Range Status   02/06/2025 2.5 (H) 1.6 - 2.4 mg/dL Final   02/05/2025 2.2 1.6 - 2.4 mg/dL Final   02/05/2025 2.5 (H) 1.6 - 2.4 mg/dL Final   02/03/2025 2.1 1.6 - 2.4 mg/dL Final          acetaminophen, 1,000 mg, Intravenous, Q8H  aspirin, 81 mg, Oral, Daily  atorvastatin, 40 mg, Oral, Nightly  ceFAZolin, 2,000 mg, Intravenous, Q8H  chlorhexidine, 15 mL, Mouth/Throat, Q12H  enoxaparin, 40 mg, Subcutaneous, Daily  magnesium sulfate, 2 g, Intravenous, Q8H  metoclopramide, 10 mg, Intravenous, Q6H  metoprolol tartrate, 12.5 mg, Oral, Q12H  mupirocin, 1 Application, Each Nare, BID  pantoprazole, 40 mg, Oral, QAM  senna-docusate sodium, 2 tablet, Oral, Nightly      amiodarone, 0.5 mg/min, Last Rate: 0.5 mg/min (02/06/25 0220)  clevidipine, 2-32 mg/hr  dexmedetomidine, 0.2-1.5 mcg/kg/hr, Last Rate: 0.4 mcg/kg/hr (02/05/25 1800)  DOPamine, 2-20 mcg/kg/min  EPINEPHrine, 0.02-0.1 mcg/kg/min  insulin, 0-100 Units/hr  lactated ringers, 9 mL/hr  milrinone, 0.25-0.375 mcg/kg/min  niCARdipine, 5-15 mg/hr, Last Rate: 3 mg/hr (02/06/25 8353)  nitroglycerin, 5-200 mcg/min  norepinephrine, 0.02-0.2 mcg/kg/min  phenylephrine, 0.2-2 mcg/kg/min  propofol, 5-50 mcg/kg/min, Last Rate: Stopped (02/05/25 4583)              Persistent atrial fibrillation    Aortic stenosis      Assessment & Plan    -Aortic valve insuffiencey- AVR (tissue), left atrial appendage clip- Pagni 2/5/2025  -atrial fibrillation on eliquis - last dose 1/29  -Hypertension  -Hyperlipidemia  -post op anemia- expected acute blood loss     POD#1  Looks good this morning  Up in the chair.  4L NC--wean as able  He is glad the  ventilator is out because he felt like he could not breathe.  Sinus rhythm under pacemaker-- rate in the 50-60s-- on IV amiodarone- will discontinue, on a little cardene this morning as well--will give the beta blocker this morning with history of atrial fibrillation.  Encourage pulmonary toilet-- add mucinex and flutter valve.   Increase activity/mobilize  Discontinue swan and soy  Transfer to stepdown.    Leah Eason, KARI  02/06/25  07:11 EST

## 2025-02-06 NOTE — THERAPY EVALUATION
Patient Name: Roney Kuhn  : 1950    MRN: [id number]                              Today's Date: 2025       Admit Date: 2025    Visit Dx:     ICD-10-CM ICD-9-CM   1. S/P AVR (aortic valve replacement)  Z95.2 V43.3   2. Persistent atrial fibrillation  I48.19 427.31     Patient Active Problem List   Diagnosis    Fatigue    Primary hypertension    Hypercholesterolemia    Overactive bladder    Chronic nasal congestion    Hyperglycemia    History of nephrolithiasis    History of 2019 novel coronavirus disease (COVID-19)    Chronic pain of both shoulders    Left upper quadrant abdominal mass    Localized skin mass, lump, or swelling    Primary insomnia    Abnormal laboratory test result    Persistent atrial fibrillation    Nonrheumatic aortic valve insufficiency    Preoperative cardiovascular examination    Aortic stenosis     Past Medical History:   Diagnosis Date    Aortic root dilation     Aortic valve insufficiency     Arthritis     Atrial fibrillation     Back problem     Basal cell carcinoma     FACE AND LT EAR    Cardiomyopathy     EF 45-50 % in 2024 on scanned media    Claustrophobia     Hyperlipidemia     Hypertension     Kidney stones      Past Surgical History:   Procedure Laterality Date    AORTIC VALVE REPAIR/REPLACEMENT N/A 2025    Procedure: AORTIC VALVE REPLACEMENT; LEFT ATRIAL APPENDAGE EXCLUSION WITH ATRICLIP; PRP WITH TRANSESOPHAGEAL ECHOCARDIOGRAM;  Surgeon: Prasanth Martinez MD;  Location: [facility];  Service: Cardiothoracic;  Laterality: N/A;    APPENDECTOMY  1980    BASAL CELL CARCINOMA EXCISION      REMOVED FROM FACE   LOCAL    CARDIAC CATHETERIZATION N/A 2024    Procedure: Left Heart Cath;  Surgeon: Deisy Keller MD;  Location: [facility] INVASIVE LOCATION;  Service: Cardiovascular;  Laterality: N/A;  preop- aortic valve surgery- rec from Dr. Martinez    CARDIAC CATHETERIZATION N/A 2024    Procedure: Coronary angiography;  Surgeon: Deisy Keller  MD;  Location:  [facility] INV[facility] LOC[facility];  Service: Cardiovascular;  Laterality: N/A;    CATARACT EXTRACTION W/ INTRAOCULAR LENS IMPLANT Bilateral 2015    COLONOSCOPY  2017    Date unknown    INGUINAL HERNIA REPAIR Bilateral     KIDNEY STONE SURGERY  2021    KNEE ARTHROSCOPY W/ MENISCAL REPAIR Right 2014    ROTATOR CUFF REPAIR Right 2009    WISDOM TOOTH EXTRACTION        General Information       Row Name 02/06/25 0936          Physical Therapy Time and Intention    Document Type evaluation (P)   -BB     Mode of Treatment physical therapy (P)   -BB       Row Name 02/06/25 0936          General Information    Patient Profile Reviewed yes (P)   -BB     Prior Level of Function independent:;all household mobility;community mobility;driving;ADL's;using stairs (P)   -BB     Existing Precautions/Restrictions cardiac;sternal;fall (P)   -BB     Barriers to Rehab none identified (P)   -BB       Row Name 02/06/25 0936          Living Environment    People in Home spouse (P)   -BB       Row Name 02/06/25 0936          Home Main Entrance    Number of Stairs, Main Entrance three (P)   -BB     Stair Railings, Main Entrance railings safe and in good condition (P)   -BB       Row Name 02/06/25 0936          Cognition    Orientation Status (Cognition) oriented x 4 (P)   -BB       Row Name 02/06/25 0936          Safety Issues/Impairments Affecting Functional Mobility    Impairments Affecting Function (Mobility) balance;coordination;endurance/activity tolerance;range of motion (ROM);strength;pain (P)   -BB               User Key  (r) = Recorded By, (t) = Taken By, (c) = Cosigned By      Initials Name Provider Type    [place]ana Olmos, PT Student PT Student                   Mobility       Row Name 02/06/25 0937          Bed Mobility    Comment, (Bed Mobility) up in chair (P)   -BB       Row Name 02/06/25 0937          Sit-Stand Transfer    Sit-Stand [place] (Transfers) verbal cues;minimum assist (75% patient effort);contact  guard;2 person assist (P)   -BB     Comment, (Sit-Stand Transfer) HHA x 2 (P)   -BB       Row Name 02/06/25 0937          Gait/Stairs (Locomotion)    [place] Level (Gait) contact guard;minimum assist (75% patient effort);2 person assist;verbal cues (P)   -BB     Assistive Device (Gait) -- (P)   HHA x 2  -BB     Distance in Feet (Gait) 100 (P)   -BB     Deviations/Abnormal Patterns (Gait) amaury decreased;gait speed decreased;stride length decreased (P)   -BB     Bilateral Gait Deviations forward flexed posture;decreased arm swing;heel strike decreased (P)   -BB     Comment, (Gait/Stairs) HHA x2 when ambulating with pt. He demos slow, guarded, steady gait. When ambulating Pt ambulated on RA and had no s/s SOA (P)   -BB               User Key  (r) = Recorded By, (t) = Taken By, (c) = Cosigned By      Initials Name Provider Type    [place] Olmos, PT Student PT Student                   Obj/Interventions       Row Name 02/06/25 0942          Range of Motion Comprehensive    General Range of Motion no range of motion deficits identified (P)   -BB       Row Name 02/06/25 0942          Strength Comprehensive (MMT)    Comment, General Manual Muscle Testing (MMT) Assessment post-op weakness (P)   -BB       Row Name 02/06/25 0942          Motor Skills    Therapeutic Exercise -- (P)   Cardiac protocol x 5 reps  -BB       Row Name 02/06/25 0942          Balance    Balance Assessment sitting static balance;sitting dynamic balance;standing static balance;standing dynamic balance (P)   -BB     Static Sitting Balance standby assist (P)   -BB     Dynamic Sitting Balance standby assist (P)   -BB     Position, Sitting Balance sitting in chair (P)   -BB     Static Standing Balance contact guard;2-person assist (P)   -BB     Dynamic Standing Balance contact guard;minimal assist;2-person assist (P)   HHA  -BB     Position/Device Used, Standing Balance -- (P)   HHA x 2  -BB               User Key  (r) = Recorded By, (t) = Taken  By, (c) = Cosigned By      Initials Name Provider Type    [place] Olmos, PT Student PT Student                   Goals/Plan       Row Name 02/06/25 1006          Problem Specific Goal 1 (PT)    Problem Specific Goal 1 (PT) Cardiac level 3 (P)   -BB     Time Frame (Problem Specific Goal 1, PT) 1 week (P)   -BB       Row Name 02/06/25 1006          Therapy Assessment/Plan (PT)    Planned Therapy Interventions (PT) balance training;bed mobility training;gait training;home exercise program;patient/family education;ROM (range of motion);strengthening;stretching;transfer training (P)   -BB               User Key  (r) = Recorded By, (t) = Taken By, (c) = Cosigned By      Initials Name Provider Type    [place] Olmos, PT Student PT Student                   Clinical Impression       Row Name 02/06/25 0945          Pain    Pretreatment Pain Rating 3/10 (P)   -BB     Posttreatment Pain Rating 3/10 (P)   -BB     Pain Location chest (P)   -BB     Pain Side/Orientation generalized (P)   -BB     Pain Management Interventions exercise or physical activity utilized (P)   -BB       Row Name 02/06/25 0945          Plan of Care Review    Plan of Care Reviewed With patient;spouse (P)   -BB     Progress improving (P)   -BB     Outcome Evaluation Pt seen for PT this AM. Pt is POD#1 AVR with a past medical history of HLD, HTN, aortic root dilation, and basal cell carcinoma. Pt has 3 stairs to get into his home, lives on one floor, and lives with his wife. Pt reports independence w mobility and ADLs at baseline.  Upon arrival, pt doing well and rated pain a 3/10. He was siiting in the bed side chair with 2L of oxygen. After managing lines and tubes with nsg, pt completed cardiac protocol x 5 reps. He completed STS with CGA/Min A x 2. Pt then ambulated 100ft on RA with CGA/HHA x 2. He demos slow, guarded, steady pace with ambulation and had no sign or sympotms of SOA during activity. Arriving back to the room, the pt sat back in the  bed side chair. Vitals were taken pre and post treatment and show no significant changes. Upon DC, Pt plans on returning home with his wife. He will benefit from skilled PT interventions for strength, balance, and endurance. Pt will continue to progress as tolerated. (P)   -BB       Row Name 02/06/25 0945          Therapy Assessment/Plan (PT)    Rehab Potential (PT) good (P)   -BB     Criteria for Skilled Interventions Met (PT) yes (P)   -BB     Therapy Frequency (PT) daily (P)   -BB       Row Name 02/06/25 0945          Vital Signs    Pre Systolic BP Rehab 112 (P)   -BB     Pre Treatment Diastolic BP 80 (P)   -BB     Post Systolic BP Rehab 127 (P)   -BB     Post Treatment Diastolic BP 72 (P)   -BB     Pretreatment Heart Rate (beats/min) 72 (P)   -BB     Posttreatment Heart Rate (beats/min) 72 (P)   -BB     Pre SpO2 (%) 100 (P)   -BB     O2 Delivery Pre Treatment supplemental O2 (P)   -BB     O2 Delivery Intra Treatment room air (P)   -BB     Post SpO2 (%) 100 (P)   -BB     O2 Delivery Post Treatment supplemental O2 (P)   -BB     Pre Patient Position Sitting (P)   -BB     Intra Patient Position Standing (P)   -BB     Post Patient Position Sitting (P)   -BB       Row Name 02/06/25 0945          Positioning and Restraints    Pre-Treatment Position sitting in chair/recliner (P)   -BB     Post Treatment Position chair (P)   -BB     In Chair notified nsg;reclined;call light within reach;encouraged to call for assist;exit alarm on;with family/caregiver;with nsg (P)   -BB               User Key  (r) = Recorded By, (t) = Taken By, (c) = Cosigned By      Initials Name Provider Type    [place] Olmos, PT Student PT Student                   Outcome Measures       Row Name 02/06/25 1007          How much help from another person do you currently need...    Turning from your back to your side while in flat bed without using bedrails? 3 (P)   -BB     Moving from lying on back to sitting on the side of a flat bed without  bedrails? 3 (P)   -BB     Moving to and from a bed to a chair (including a wheelchair)? 3 (P)   -BB     Standing up from a chair using your arms (e.g., wheelchair, bedside chair)? 3 (P)   -BB     Climbing 3-5 steps with a railing? 3 (P)   -BB     To walk in hospital room? 3 (P)   -BB     AM-PAC 6 Clicks Score (PT) 18 (P)   -BB     Highest Level of Mobility Goal 6 --> Walk 10 steps or more (P)   -BB       Row Name 02/06/25 [facility]          Functional Assessment    Outcome Measure Options AM-PAC 6 Clicks Basic Mobility (PT) (P)   -BB               User Key  (r) = Recorded By, (t) = Taken By, (c) = Cosigned By      Initials Name Provider Type    [place] Olmos, PT Student PT Student                                 Physical Therapy Education        No education to display                  PT Recommendation and Plan  Planned Therapy Interventions (PT): (P) balance training, bed mobility training, gait training, home exercise program, patient/family education, ROM (range of motion), strengthening, stretching, transfer training  Progress: (P) improving  Outcome Evaluation: (P) Pt seen for PT this AM. Pt is POD#1 AVR with a past medical history of HLD, HTN, aortic root dilation, and basal cell carcinoma. Pt has 3 stairs to get into his home, lives on one floor, and lives with his wife. Pt reports independence w mobility and ADLs at baseline.  Upon arrival, pt doing well and rated pain a 3/10. He was siiting in the bed side chair with 2L of oxygen. After managing lines and tubes with nsg, pt completed cardiac protocol x 5 reps. He completed STS with CGA/Min A x 2. Pt then ambulated 100ft on RA with CGA/HHA x 2. He demos slow, guarded, steady pace with ambulation and had no sign or sympotms of SOA during activity. Arriving back to the room, the pt sat back in the bed side chair. Vitals were taken pre and post treatment and show no significant changes. Upon DC, Pt plans on returning home with his wife. He will benefit from  skilled PT interventions for strength, balance, and endurance. Pt will continue to progress as tolerated.     Time Calculation:         PT Charges       Row Name 02/06/25 1008             Time Calculation    Start Time 0845 (P)   -BB      Stop Time 0908 (P)   -BB      Time Calculation (min) 23 min (P)   -BB      PT Received On 02/06/25 (P)   -BB      PT - Next Appointment 02/07/25 (P)   -BB      PT Goal Re-Cert Due Date 02/13/25 (P)   -BB         Time Calculation- PT    Total Timed Code Minutes- PT 18 minute(s) (P)   -BB                User Key  (r) = Recorded By, (t) = Taken By, (c) = Cosigned By      Initials Name Provider Type    [place] Olmos, PT Student PT Student                      PT G-Codes  Outcome Measure Options: (P) AM-PAC 6 Clicks Basic Mobility (PT)  AM-PAC 6 Clicks Score (PT): (P) 18  PT Discharge Summary  Anticipated Discharge Disposition (PT): (P) home, home with assist, home with home health    Montana Gutierres, PT Student  2/6/2025

## 2025-02-06 NOTE — PLAN OF CARE
Goal Outcome Evaluation:              Outcome Evaluation: S/p AVR, POD1. Aaox4, NSR on tele, all other VSS. Weaned to RA and tolerating. XPM on backup rate of 50, sensing only. Chest tubes, [place], IJ in place.No c/o pain, meds PRN. Ambulating x1 assist. Family at bedside. Will continue with routine postop care and update POC as needed.

## 2025-02-06 NOTE — NURSING NOTE
Noted patient arterial line not working. AA made aware and Burton CANTU pulled it out and hold pressure.

## 2025-02-06 NOTE — PLAN OF CARE
Goal Outcome Evaluation:  Plan of Care Reviewed With: (P) patient, spouse        Progress: (P) improving  Outcome Evaluation: (P) Pt seen for PT this AM. Pt is POD#1 AVR with a past medical history of HLD, HTN, aortic root dilation, and basal cell carcinoma. Pt has 3 stairs to get into his home, lives on one floor, and lives with his wife. Pt reports independence w mobility and ADLs at baseline.  Upon arrival, pt doing well and rated pain a 3/10. He was siiting in the bed side chair with 2L of oxygen. After managing lines and tubes with nsg, pt completed cardiac protocol x 5 reps. He completed STS with CGA/Min A x 2. Pt then ambulated 100ft on RA with CGA/HHA x 2. He demos slow, guarded, steady pace with ambulation and had no sign or sympotms of SOA during activity. Arriving back to the room, the pt sat back in the bed side chair. Vitals were taken pre and post treatment and show no significant changes. Upon DC, Pt plans on returning home with his wife. He will benefit from skilled PT interventions for strength, balance, and endurance. Pt will continue to progress as tolerated.    Anticipated Discharge Disposition (PT): (P) home, home with assist, home with home health

## 2025-02-07 ENCOUNTER — APPOINTMENT (OUTPATIENT)
Dept: GENERAL RADIOLOGY | Facility: HOSPITAL | Age: 75
DRG: 220 | End: 2025-02-07
Payer: MEDICARE

## 2025-02-07 LAB
ANION GAP SERPL CALCULATED.3IONS-SCNC: 10.1 MMOL/L (ref 5–15)
BASE EXCESS BLDA CALC-SCNC: -1 MMOL/L (ref -5–5)
BASE EXCESS BLDA CALC-SCNC: -2 MMOL/L (ref -5–5)
BASE EXCESS BLDA CALC-SCNC: 0 MMOL/L (ref -5–5)
BASE EXCESS BLDA CALC-SCNC: 0 MMOL/L (ref -5–5)
BASE EXCESS BLDA CALC-SCNC: 2 MMOL/L (ref -5–5)
BUN SERPL-MCNC: 25 MG/DL (ref 8–23)
BUN/CREAT SERPL: 26 (ref 7–25)
CALCIUM SPEC-SCNC: 8.5 MG/DL (ref 8.6–10.5)
CHLORIDE SERPL-SCNC: 103 MMOL/L (ref 98–107)
CO2 BLDA-SCNC: 25 MMOL/L (ref 24–29)
CO2 BLDA-SCNC: 26 MMOL/L (ref 24–29)
CO2 BLDA-SCNC: 27 MMOL/L (ref 24–29)
CO2 BLDA-SCNC: 27 MMOL/L (ref 24–29)
CO2 BLDA-SCNC: 29 MMOL/L (ref 24–29)
CO2 SERPL-SCNC: 23.9 MMOL/L (ref 22–29)
CREAT SERPL-MCNC: 0.96 MG/DL (ref 0.76–1.27)
CYTO UR: NORMAL
DEPRECATED RDW RBC AUTO: 44.5 FL (ref 37–54)
EGFRCR SERPLBLD CKD-EPI 2021: 81.4 ML/MIN/1.73
ERYTHROCYTE [DISTWIDTH] IN BLOOD BY AUTOMATED COUNT: 13 % (ref 12.3–15.4)
GLUCOSE BLDC GLUCOMTR-MCNC: 103 MG/DL (ref 70–130)
GLUCOSE BLDC GLUCOMTR-MCNC: 112 MG/DL (ref 70–130)
GLUCOSE BLDC GLUCOMTR-MCNC: 116 MG/DL (ref 70–130)
GLUCOSE BLDC GLUCOMTR-MCNC: 116 MG/DL (ref 70–130)
GLUCOSE BLDC GLUCOMTR-MCNC: 122 MG/DL (ref 70–130)
GLUCOSE BLDC GLUCOMTR-MCNC: 122 MG/DL (ref 70–130)
GLUCOSE BLDC GLUCOMTR-MCNC: 127 MG/DL (ref 70–130)
GLUCOSE BLDC GLUCOMTR-MCNC: 143 MG/DL (ref 70–130)
GLUCOSE BLDC GLUCOMTR-MCNC: 158 MG/DL (ref 70–130)
GLUCOSE SERPL-MCNC: 121 MG/DL (ref 65–99)
HCO3 BLDA-SCNC: 23.6 MMOL/L (ref 22–26)
HCO3 BLDA-SCNC: 24.5 MMOL/L (ref 22–26)
HCO3 BLDA-SCNC: 25.3 MMOL/L (ref 22–26)
HCO3 BLDA-SCNC: 25.9 MMOL/L (ref 22–26)
HCO3 BLDA-SCNC: 27.6 MMOL/L (ref 22–26)
HCT VFR BLD AUTO: 37.4 % (ref 37.5–51)
HCT VFR BLDA CALC: 30 % (ref 38–51)
HCT VFR BLDA CALC: 31 % (ref 38–51)
HCT VFR BLDA CALC: 33 % (ref 38–51)
HCT VFR BLDA CALC: 34 % (ref 38–51)
HCT VFR BLDA CALC: 36 % (ref 38–51)
HGB BLD-MCNC: 12.9 G/DL (ref 13–17.7)
HGB BLDA-MCNC: 10.2 G/DL (ref 12–17)
HGB BLDA-MCNC: 10.5 G/DL (ref 12–17)
HGB BLDA-MCNC: 11.2 G/DL (ref 12–17)
HGB BLDA-MCNC: 11.6 G/DL (ref 12–17)
HGB BLDA-MCNC: 12.2 G/DL (ref 12–17)
LAB AP CASE REPORT: NORMAL
MCH RBC QN AUTO: 32.3 PG (ref 26.6–33)
MCHC RBC AUTO-ENTMCNC: 34.5 G/DL (ref 31.5–35.7)
MCV RBC AUTO: 93.5 FL (ref 79–97)
PATH REPORT.FINAL DX SPEC: NORMAL
PATH REPORT.GROSS SPEC: NORMAL
PCO2 BLDA: 41.2 MM HG (ref 35–45)
PCO2 BLDA: 41.4 MM HG (ref 35–45)
PCO2 BLDA: 43 MM HG (ref 35–45)
PCO2 BLDA: 46.7 MM HG (ref 35–45)
PCO2 BLDA: 47.7 MM HG (ref 35–45)
PH BLDA: 7.38 PH UNITS (ref 7.35–7.6)
PH BLDA: 7.38 PH UNITS (ref 7.35–7.6)
PH BLDA: 7.39 PH UNITS (ref 7.35–7.6)
PH BLDA: 7.39 PH UNITS (ref 7.35–7.6)
PH BLDA: 7.4 PH UNITS (ref 7.35–7.6)
PLATELET # BLD AUTO: 146 10*3/MM3 (ref 140–450)
PMV BLD AUTO: 9.7 FL (ref 6–12)
PO2 BLDA: 138 MMHG (ref 80–105)
PO2 BLDA: 333 MMHG (ref 80–105)
PO2 BLDA: 420 MMHG (ref 80–105)
PO2 BLDA: 48 MMHG (ref 80–105)
PO2 BLDA: 538 MMHG (ref 80–105)
POTASSIUM BLDA-SCNC: 3.6 MMOL/L (ref 3.5–4.9)
POTASSIUM BLDA-SCNC: 3.9 MMOL/L (ref 3.5–4.9)
POTASSIUM BLDA-SCNC: 4 MMOL/L (ref 3.5–4.9)
POTASSIUM BLDA-SCNC: 4.1 MMOL/L (ref 3.5–4.9)
POTASSIUM BLDA-SCNC: 4.1 MMOL/L (ref 3.5–4.9)
POTASSIUM SERPL-SCNC: 4 MMOL/L (ref 3.5–5.2)
QT INTERVAL: 348 MS
QT INTERVAL: 370 MS
QT INTERVAL: 432 MS
QTC INTERVAL: 410 MS
QTC INTERVAL: 417 MS
QTC INTERVAL: 433 MS
RBC # BLD AUTO: 4 10*6/MM3 (ref 4.14–5.8)
SAO2 % BLDA: 100 % (ref 95–98)
SAO2 % BLDA: 81 % (ref 95–98)
SAO2 % BLDA: 99 % (ref 95–98)
SODIUM SERPL-SCNC: 137 MMOL/L (ref 136–145)
WBC NRBC COR # BLD AUTO: 13.09 10*3/MM3 (ref 3.4–10.8)

## 2025-02-07 PROCEDURE — 97530 THERAPEUTIC ACTIVITIES: CPT

## 2025-02-07 PROCEDURE — 25010000002 MAGNESIUM SULFATE 2 GM/50ML SOLUTION: Performed by: NURSE PRACTITIONER

## 2025-02-07 PROCEDURE — 93010 ELECTROCARDIOGRAM REPORT: CPT | Performed by: INTERNAL MEDICINE

## 2025-02-07 PROCEDURE — 25010000002 ENOXAPARIN PER 10 MG: Performed by: NURSE PRACTITIONER

## 2025-02-07 PROCEDURE — 99024 POSTOP FOLLOW-UP VISIT: CPT | Performed by: NURSE PRACTITIONER

## 2025-02-07 PROCEDURE — 25010000002 AMIODARONE IN DEXTROSE 5% 360-4.14 MG/200ML-% SOLUTION

## 2025-02-07 PROCEDURE — 93005 ELECTROCARDIOGRAM TRACING: CPT | Performed by: NURSE PRACTITIONER

## 2025-02-07 PROCEDURE — 85027 COMPLETE CBC AUTOMATED: CPT | Performed by: NURSE PRACTITIONER

## 2025-02-07 PROCEDURE — 93005 ELECTROCARDIOGRAM TRACING: CPT | Performed by: THORACIC SURGERY (CARDIOTHORACIC VASCULAR SURGERY)

## 2025-02-07 PROCEDURE — 63710000001 INSULIN LISPRO (HUMAN) PER 5 UNITS: Performed by: NURSE PRACTITIONER

## 2025-02-07 PROCEDURE — 82948 REAGENT STRIP/BLOOD GLUCOSE: CPT

## 2025-02-07 PROCEDURE — 80048 BASIC METABOLIC PNL TOTAL CA: CPT | Performed by: NURSE PRACTITIONER

## 2025-02-07 PROCEDURE — 99232 SBSQ HOSP IP/OBS MODERATE 35: CPT | Performed by: INTERNAL MEDICINE

## 2025-02-07 PROCEDURE — 25010000002 CEFAZOLIN PER 500 MG: Performed by: NURSE PRACTITIONER

## 2025-02-07 PROCEDURE — 71045 X-RAY EXAM CHEST 1 VIEW: CPT

## 2025-02-07 RX ORDER — MAGNESIUM SULFATE HEPTAHYDRATE 40 MG/ML
2 INJECTION, SOLUTION INTRAVENOUS ONCE
Status: COMPLETED | OUTPATIENT
Start: 2025-02-07 | End: 2025-02-07

## 2025-02-07 RX ORDER — POLYETHYLENE GLYCOL 3350 17 G/17G
17 POWDER, FOR SOLUTION ORAL DAILY
Status: DISCONTINUED | OUTPATIENT
Start: 2025-02-07 | End: 2025-02-09 | Stop reason: HOSPADM

## 2025-02-07 RX ORDER — DOCUSATE SODIUM 100 MG/1
100 CAPSULE, LIQUID FILLED ORAL 2 TIMES DAILY
Status: DISCONTINUED | OUTPATIENT
Start: 2025-02-07 | End: 2025-02-09 | Stop reason: HOSPADM

## 2025-02-07 RX ORDER — FUROSEMIDE 40 MG/1
40 TABLET ORAL DAILY
Status: DISCONTINUED | OUTPATIENT
Start: 2025-02-07 | End: 2025-02-09 | Stop reason: HOSPADM

## 2025-02-07 RX ORDER — POTASSIUM CHLORIDE 750 MG/1
20 TABLET, EXTENDED RELEASE ORAL DAILY
Status: DISCONTINUED | OUTPATIENT
Start: 2025-02-07 | End: 2025-02-09 | Stop reason: HOSPADM

## 2025-02-07 RX ORDER — METOPROLOL TARTRATE 50 MG
50 TABLET ORAL EVERY 12 HOURS SCHEDULED
Status: DISCONTINUED | OUTPATIENT
Start: 2025-02-07 | End: 2025-02-09 | Stop reason: HOSPADM

## 2025-02-07 RX ADMIN — ENOXAPARIN SODIUM 40 MG: 100 INJECTION SUBCUTANEOUS at 16:13

## 2025-02-07 RX ADMIN — AMIODARONE HYDROCHLORIDE 0.5 MG/MIN: 1.8 INJECTION, SOLUTION INTRAVENOUS at 23:12

## 2025-02-07 RX ADMIN — ASPIRIN 81 MG: 81 TABLET, COATED ORAL at 08:09

## 2025-02-07 RX ADMIN — 0.12% CHLORHEXIDINE GLUCONATE 15 ML: 1.2 RINSE ORAL at 16:13

## 2025-02-07 RX ADMIN — CEFAZOLIN 2000 MG: 2 INJECTION, POWDER, FOR SOLUTION INTRAMUSCULAR; INTRAVENOUS at 06:25

## 2025-02-07 RX ADMIN — 0.12% CHLORHEXIDINE GLUCONATE 15 ML: 1.2 RINSE ORAL at 04:11

## 2025-02-07 RX ADMIN — AMIODARONE HYDROCHLORIDE 1 MG/MIN: 1.8 INJECTION, SOLUTION INTRAVENOUS at 16:43

## 2025-02-07 RX ADMIN — POTASSIUM CHLORIDE 20 MEQ: 750 TABLET, EXTENDED RELEASE ORAL at 09:25

## 2025-02-07 RX ADMIN — GABAPENTIN 100 MG: 100 CAPSULE ORAL at 08:09

## 2025-02-07 RX ADMIN — HYDROCODONE BITARTRATE AND ACETAMINOPHEN 2 TABLET: 5; 325 TABLET ORAL at 07:46

## 2025-02-07 RX ADMIN — FUROSEMIDE 40 MG: 40 TABLET ORAL at 09:25

## 2025-02-07 RX ADMIN — DOCUSATE SODIUM 100 MG: 100 CAPSULE, LIQUID FILLED ORAL at 20:50

## 2025-02-07 RX ADMIN — METOPROLOL TARTRATE 25 MG: 25 TABLET, FILM COATED ORAL at 08:09

## 2025-02-07 RX ADMIN — GUAIFENESIN 1200 MG: 600 TABLET, MULTILAYER, EXTENDED RELEASE ORAL at 08:09

## 2025-02-07 RX ADMIN — MUPIROCIN 1 APPLICATION: 20 OINTMENT TOPICAL at 20:50

## 2025-02-07 RX ADMIN — SENNOSIDES AND DOCUSATE SODIUM 2 TABLET: 50; 8.6 TABLET ORAL at 20:50

## 2025-02-07 RX ADMIN — HYDROCODONE BITARTRATE AND ACETAMINOPHEN 2 TABLET: 5; 325 TABLET ORAL at 16:13

## 2025-02-07 RX ADMIN — PANTOPRAZOLE SODIUM 40 MG: 40 TABLET, DELAYED RELEASE ORAL at 06:25

## 2025-02-07 RX ADMIN — DOCUSATE SODIUM 100 MG: 100 CAPSULE, LIQUID FILLED ORAL at 09:25

## 2025-02-07 RX ADMIN — POLYETHYLENE GLYCOL 3350 17 G: 17 POWDER, FOR SOLUTION ORAL at 09:24

## 2025-02-07 RX ADMIN — MUPIROCIN 1 APPLICATION: 20 OINTMENT TOPICAL at 08:09

## 2025-02-07 RX ADMIN — GUAIFENESIN 1200 MG: 600 TABLET, MULTILAYER, EXTENDED RELEASE ORAL at 20:50

## 2025-02-07 RX ADMIN — MAGNESIUM SULFATE HEPTAHYDRATE 2 G: 40 INJECTION, SOLUTION INTRAVENOUS at 09:24

## 2025-02-07 RX ADMIN — METOPROLOL TARTRATE 50 MG: 50 TABLET, FILM COATED ORAL at 20:50

## 2025-02-07 RX ADMIN — ATORVASTATIN CALCIUM 40 MG: 20 TABLET, FILM COATED ORAL at 20:50

## 2025-02-07 RX ADMIN — GABAPENTIN 100 MG: 100 CAPSULE ORAL at 20:50

## 2025-02-07 RX ADMIN — INSULIN LISPRO 2 UNITS: 100 INJECTION, SOLUTION INTRAVENOUS; SUBCUTANEOUS at 20:50

## 2025-02-07 NOTE — CONSULTS
Met with patient to discuss the benefits of cardiac rehab. Provided phase II information along with the contact information for cardiac rehab here at [facility]. Explained if receiving home health would not be able to attend cardiac rehab until finished with home health. Will call and schedule patient after discharge.

## 2025-02-07 NOTE — CASE MANAGEMENT/SOCIAL WORK
Discharge Planning Assessment  [facility]     Patient Name: Roney Kuhn  MRN: [id number]  Today's Date: 2/7/2025    Admit Date: 2/5/2025    Plan: Home w/ Mp LUA   Discharge Needs Assessment       Row Name 02/07/25 1224       Living Environment    People in Home spouse    Name(s) of People in Home Cristine Kuhn/wife    Current Living Arrangements home    Potentially Unsafe Housing Conditions none    In the past 12 months has the electric, gas, oil, or water company threatened to shut off services in your home? No    Primary Care Provided by self    Provides Primary Care For pet(s)  1 DOG    Family Caregiver if Needed spouse    Family Caregiver Names CRISTINE/WIFE    Quality of Family Relationships helpful;involved;supportive    Able to Return to Prior Arrangements yes       Resource/Environmental Concerns    Resource/Environmental Concerns none    Transportation Concerns none       Transportation Needs    In the past 12 months, has lack of transportation kept you from medical appointments or from getting medications? no    In the past 12 months, has lack of transportation kept you from meetings, work, or from getting things needed for daily living? No       Food Insecurity    Within the past 12 months, you worried that your food would run out before you got the money to buy more. Never true    Within the past 12 months, the food you bought just didn't last and you didn't have money to get more. Never true       Transition Planning    Patient/Family Anticipates Transition to home with family    Patient/Family Anticipated Services at Transition home health care    Transportation Anticipated family or friend will provide       Discharge Needs Assessment    Readmission Within the Last 30 Days no previous admission in last 30 days    Equipment Currently Used at Home bp cuff;scales;grab bar    Concerns to be Addressed discharge planning    Do you want help finding or keeping work or a job? I do not need or  want help    Do you want help with school or training? For example, starting or completing job training or getting a high school diploma, GED or equivalent No    Anticipated Changes Related to Illness none    Equipment Needed After Discharge none    Discharge Facility/Level of Care Needs home with home health    Provided Post Acute Provider Quality & Resource List? Refused    Offered/Gave Vendor List yes    Patient's Choice of Community Agency(s) [facility]                   Discharge Plan       Row Name 02/07/25 1226       Plan    Plan Home w/ [facility]    Plan Comments Spoke with patient and spouse/Cristine Kuhn at bedside.  Introduced self and role.  Pt confirmed information on his facesheet.  Pt is IADL's, works part-time and drives.  Pt PCP Lizz Wiggins and pharmacy is [facility].  Pt has BP cuff, grab bars and scale at home.  Pt chose [facility] to follow him at d/c.  [facility]  CCP following........Verito /ELIZABETH                  Continued Care and Services - Admitted Since 2/5/2025       Home Medical Care Coordination complete.      Service Provider Request Status Services Address Phone Fax Patient Preferred    [facility] - [facility] [id number]  [place] [address] [phone] --                  Expected Discharge Date and Time       Expected Discharge Date Expected Discharge Time    Feb 11, 2025            Demographic Summary       Row Name 02/07/25 1219       General Information    Admission Type inpatient    Arrived From home    Required Notices Provided Important Message from Medicare    Referral Source admission list    Reason for Consult discharge planning    Preferred Language English       Contact Information    Permission Granted to Share Info With                    Functional Status       Row Name 02/07/25 1218       Functional  Status    Usual Activity Tolerance good    Current Activity Tolerance moderate       Assessment of Health Literacy    How often do you have someone help you read hospital materials? Never    Health Literacy Good       Functional Status, IADL    Medications independent    Meal Preparation independent    Housekeeping independent    Laundry independent    Shopping independent    If for any reason you need help with day-to-day activities such as bathing, preparing meals, shopping, managing finances, etc., do you get the help you need? I don't need any help       Mental Status    General Appearance WDL WDL       Mental Status Summary    Recent Changes in Mental Status/Cognitive Functioning no changes       Employment/    Employment Status employed part-time    Current or Previous Occupation desk job                   Psychosocial    No documentation.                  Abuse/Neglect    No documentation.                  Legal    No documentation.                  Substance Abuse    No documentation.                  Patient Forms    No documentation.                     Verito Rogers RN

## 2025-02-07 NOTE — PROGRESS NOTES
" LOS: 2 days   Patient Care Team:  Lizz Wiggins MD as PCP - General (Family Medicine)    Chief Complaint: post op follow-up    Subjective  Feeling good today  Vital Signs  Temp:  [97.7 °F (36.5 °C)-98.5 °F (36.9 °C)] 97.7 °F (36.5 °C)  Heart Rate:  [61-76] 74  Resp:  [17-18] 18  BP: (110-136)/(65-99) 136/84  Arterial Line BP: (151)/(66) 151/66  Body mass index is 26.08 kg/m².    Intake/Output Summary (Last 24 hours) at 2/7/2025 0724  Last data filed at 2/7/2025 0400  Gross per 24 hour   Intake 641 ml   Output 1075 ml   Net -434 ml     No intake/output data recorded.    Chest tube drainage last 8 hours 30        02/07/25  0314 02/07/25  0634   Weight: 89.4 kg (197 lb 3.2 oz) 89.7 kg (197 lb 11.2 oz)         Objective:  Vital signs: (most recent): Blood pressure 147/86, pulse 80, temperature 99 °F (37.2 °C), temperature source Oral, resp. rate 18, height 185.4 cm (73\"), weight 89.7 kg (197 lb 11.2 oz), SpO2 98%.                     Results Review:        WBC WBC   Date Value Ref Range Status   02/07/2025 13.09 (H) 3.40 - 10.80 10*3/mm3 Final   02/06/2025 9.02 3.40 - 10.80 10*3/mm3 Final   02/05/2025 11.41 (H) 3.40 - 10.80 10*3/mm3 Final   02/05/2025 9.18 3.40 - 10.80 10*3/mm3 Final   02/05/2025 6.01 3.40 - 10.80 10*3/mm3 Final      HGB Hemoglobin   Date Value Ref Range Status   02/07/2025 12.9 (L) 13.0 - 17.7 g/dL Final   02/06/2025 12.3 (L) 13.0 - 17.7 g/dL Final   02/05/2025 13.5 13.0 - 17.7 g/dL Final   02/05/2025 12.4 (L) 13.0 - 17.7 g/dL Final   02/05/2025 11.0 (L) 13.0 - 17.7 g/dL Final      HCT Hematocrit   Date Value Ref Range Status   02/07/2025 37.4 (L) 37.5 - 51.0 % Final   02/06/2025 35.2 (L) 37.5 - 51.0 % Final   02/05/2025 38.0 37.5 - 51.0 % Final   02/05/2025 36.0 (L) 37.5 - 51.0 % Final   02/05/2025 31.5 (L) 37.5 - 51.0 % Final      Platelets Platelets   Date Value Ref Range Status   02/07/2025 146 140 - 450 10*3/mm3 Final   02/06/2025 160 140 - 450 10*3/mm3 Final   02/05/2025 147 140 - 450 " 10*3/mm3 Final   02/05/2025 147 140 - 450 10*3/mm3 Final   02/05/2025 101 (L) 140 - 450 10*3/mm3 Final   02/05/2025 154 140 - 450 10*3/mm3 Final        PT/INR:    Protime   Date Value Ref Range Status   02/06/2025 15.4 (H) 11.7 - 14.2 Seconds Final   02/05/2025 17.6 (H) 11.7 - 14.2 Seconds Final   02/05/2025 19.3 (H) 11.7 - 14.2 Seconds Final   /  INR   Date Value Ref Range Status   02/06/2025 1.23 (H) 0.90 - 1.10 Final   02/05/2025 1.44 (H) 0.90 - 1.10 Final   02/05/2025 1.62 (H) 0.90 - 1.10 Final       Sodium Sodium   Date Value Ref Range Status   02/07/2025 137 136 - 145 mmol/L Final   02/06/2025 142 136 - 145 mmol/L Final   02/05/2025 140 136 - 145 mmol/L Final   02/05/2025 139 136 - 145 mmol/L Final      Potassium Potassium   Date Value Ref Range Status   02/07/2025 4.0 3.5 - 5.2 mmol/L Final   02/06/2025 3.8 3.5 - 5.2 mmol/L Final   02/05/2025 3.7 3.5 - 5.2 mmol/L Final   02/05/2025 3.6 3.5 - 5.2 mmol/L Final   02/05/2025 3.9 3.5 - 5.2 mmol/L Final      Chloride Chloride   Date Value Ref Range Status   02/07/2025 103 98 - 107 mmol/L Final   02/06/2025 109 (H) 98 - 107 mmol/L Final   02/05/2025 107 98 - 107 mmol/L Final   02/05/2025 104 98 - 107 mmol/L Final      Bicarbonate CO2   Date Value Ref Range Status   02/07/2025 23.9 22.0 - 29.0 mmol/L Final   02/06/2025 22.4 22.0 - 29.0 mmol/L Final   02/05/2025 20.0 (L) 22.0 - 29.0 mmol/L Final   02/05/2025 22.2 22.0 - 29.0 mmol/L Final      BUN BUN   Date Value Ref Range Status   02/07/2025 25 (H) 8 - 23 mg/dL Final   02/06/2025 15 8 - 23 mg/dL Final   02/05/2025 15 8 - 23 mg/dL Final   02/05/2025 16 8 - 23 mg/dL Final      Creatinine Creatinine   Date Value Ref Range Status   02/07/2025 0.96 0.76 - 1.27 mg/dL Final   02/06/2025 0.83 0.76 - 1.27 mg/dL Final   02/05/2025 0.82 0.76 - 1.27 mg/dL Final   02/05/2025 0.94 0.76 - 1.27 mg/dL Final      Calcium Calcium   Date Value Ref Range Status   02/07/2025 8.5 (L) 8.6 - 10.5 mg/dL Final   02/06/2025 8.0 (L) 8.6 - 10.5  mg/dL Final   02/05/2025 8.5 (L) 8.6 - 10.5 mg/dL Final   02/05/2025 7.7 (L) 8.6 - 10.5 mg/dL Final      Magnesium Magnesium   Date Value Ref Range Status   02/06/2025 2.5 (H) 1.6 - 2.4 mg/dL Final   02/05/2025 2.2 1.6 - 2.4 mg/dL Final   02/05/2025 2.5 (H) 1.6 - 2.4 mg/dL Final          aspirin, 81 mg, Oral, Daily  atorvastatin, 40 mg, Oral, Nightly  chlorhexidine, 15 mL, Mouth/Throat, Q12H  enoxaparin, 40 mg, Subcutaneous, Daily  gabapentin, 100 mg, Oral, Q12H  guaiFENesin, 1,200 mg, Oral, Q12H  insulin lispro, 2-7 Units, Subcutaneous, 4x Daily AC & at Bedtime  metoprolol tartrate, 25 mg, Oral, Q12H  mupirocin, 1 Application, Each Nare, BID  pantoprazole, 40 mg, Oral, QAM  senna-docusate sodium, 2 tablet, Oral, Nightly      niCARdipine, 5-15 mg/hr, Last Rate: 2 mg/hr (02/06/25 1001)              Persistent atrial fibrillation    Aortic stenosis      Assessment & Plan    -Aortic valve insuffiencey- AVR (tissue), left atrial appendage clip- Juan 2/5/2025  -atrial fibrillation on eliquis - last dose 1/29  -Hypertension  -Hyperlipidemia  -post op anemia- expected acute blood loss     POD#2  Looks good this morning  Up in the chair.  2L NC--wean as able  Sinus rhythm under pacemaker-- rate in the 80- hypertensive this morning will increase beta blocker  Encourage pulmonary toilet-- continue IS/flutter valve    CXR with bilateral pneumothoraces-- no air leak noted in the chest tubes.  Will discuss with Dr. Martinez about removal  Increase activity/mobilize  Discontinue ragsdale and central line.  Continue routine care     KARI Levy  02/07/25  07:24 EST

## 2025-02-07 NOTE — PLAN OF CARE
Goal Outcome Evaluation:  Plan of Care Reviewed With: (P) patient        Progress: improving  Outcome Evaluation: (P) Pt seen for PT this AM. He is doing well, sitting up in bed upon entering to room, just finishing lunch. He reported not having any pain and feels he is progressing well. Pt performed supine to sit with CGA/min A x 1. Pt performed sit to stand with min A/HHA x 1. Pt then ambulated 300ft with min A/HHA x 1. Pt demos a slow, guarded, steady gait. Pt returned to room, and sat back down in the bedside chair. Family present in room and with all needs in reach. Pt tolerated PT well and will continue to progress as tolerated.    Anticipated Discharge Disposition (PT): home, home with assist, home with home health

## 2025-02-07 NOTE — CONSULTS
Chp visiting with Pt at their request. Pt siting up in bed. Family is currently present at bedside. Chp offered introductions to Pt and family and spoke with Pt to provide a supportive presence. No specific spiritual concerns expressed.   Chp remains available to Pt as needed.

## 2025-02-07 NOTE — THERAPY EVALUATION
Patient Name: Roney Kuhn  : 1950    MRN: [id number]                              Today's Date: 2025       Admit Date: 2025    Visit Dx:     ICD-10-CM ICD-9-CM   1. S/P AVR (aortic valve replacement)  Z95.2 V43.3   2. Persistent atrial fibrillation  I48.19 427.31     Patient Active Problem List   Diagnosis    Fatigue    Primary hypertension    Hypercholesterolemia    Overactive bladder    Chronic nasal congestion    Hyperglycemia    History of nephrolithiasis    History of 2019 novel coronavirus disease (COVID-19)    Chronic pain of both shoulders    Left upper quadrant abdominal mass    Localized skin mass, lump, or swelling    Primary insomnia    Abnormal laboratory test result    Persistent atrial fibrillation    Nonrheumatic aortic valve insufficiency    Preoperative cardiovascular examination    Aortic stenosis     Past Medical History:   Diagnosis Date    Aortic root dilation     Aortic valve insufficiency     Arthritis     Atrial fibrillation     Back problem     Basal cell carcinoma     FACE AND LT EAR    Cardiomyopathy     EF 45-50 % in 2024 on scanned media    Claustrophobia     Hyperlipidemia     Hypertension     Kidney stones      Past Surgical History:   Procedure Laterality Date    AORTIC VALVE REPAIR/REPLACEMENT N/A 2025    Procedure: AORTIC VALVE REPLACEMENT; LEFT ATRIAL APPENDAGE EXCLUSION WITH ATRICLIP; PRP WITH TRANSESOPHAGEAL ECHOCARDIOGRAM;  Surgeon: Prasanth Martinez MD;  Location: [facility];  Service: Cardiothoracic;  Laterality: N/A;    APPENDECTOMY  1980    BASAL CELL CARCINOMA EXCISION      REMOVED FROM FACE   LOCAL    CARDIAC CATHETERIZATION N/A 2024    Procedure: Left Heart Cath;  Surgeon: Deisy Keller MD;  Location: [facility] INVASIVE LOCATION;  Service: Cardiovascular;  Laterality: N/A;  preop- aortic valve surgery- rec from Dr. Martinez    CARDIAC CATHETERIZATION N/A 2024    Procedure: Coronary angiography;  Surgeon: Deisy Keller  MD;  Location: [facility] INVASIVE LOCATION;  Service: Cardiovascular;  Laterality: N/A;    CATARACT EXTRACTION W/ INTRAOCULAR LENS IMPLANT Bilateral 2015    COLONOSCOPY  2017    Date unknown    INGUINAL HERNIA REPAIR Bilateral     KIDNEY STONE SURGERY  2021    KNEE ARTHROSCOPY W/ MENISCAL REPAIR Right 2014    ROTATOR CUFF REPAIR Right 2009    WISDOM TOOTH EXTRACTION        General Information       Row Name 02/07/25 1247          Physical Therapy Time and Intention    Document Type therapy note (daily note) (P)   -BB     Mode of Treatment physical therapy (P)   -BB       Row Name 02/07/25 1247          General Information    Existing Precautions/Restrictions cardiac;sternal;fall (P)   -BB       Row Name 02/07/25 1247          Safety Issues/Impairments Affecting Functional Mobility    Impairments Affecting Function (Mobility) balance;coordination;endurance/activity tolerance;range of motion (ROM);strength;pain (P)   -BB               User Key  (r) = Recorded By, (t) = Taken By, (c) = Cosigned By      Initials Name Provider Type    Montana Olmos, PT Student PT Student                   Mobility    No documentation.                  Obj/Interventions       Row Name 02/07/25 1252          Range of Motion Comprehensive    General Range of Motion no range of motion deficits identified (P)   -BB       [facility] Name 02/07/25 1252          Strength Comprehensive (MMT)    Comment, General Manual Muscle Testing (MMT) Assessment Post-op weakness (P)   -BB       [facility] Name 02/07/25 1252          Balance    Comment, Balance Pt's balance is improving with more activity (P)   -BB               User Key  (r) = Recorded By, (t) = Taken By, (c) = Cosigned By      Initials Name Provider Type    Montana Olmos PT Student PT Student                   Goals/Plan    No documentation.                  Clinical Impression       [facility] Name 02/07/25 1253          Pain    Pretreatment Pain Rating 0/10 - no pain (P)   -BB     Posttreatment Pain  Rating 0/10 - no pain (P)   -BB     Pain Location chest (P)   -BB     Pain Side/Orientation generalized (P)   -BB       Row Name 02/07/25 1253          Plan of Care Review    Plan of Care Reviewed With patient (P)   -BB     Outcome Evaluation Pt seen for PT this AM. He is doing well, sitting up in bed upon entering to room, just finishing lunch. He reported not having any pain and feels he is progressing well. Pt performed supine to sit with CGA/min A x 1. Pt performed sit to stand with min A/HHA x 1. Pt then ambulated 300ft with min A/HHA x 1. Pt demos a slow, guarded, steady gait. Pt returned to room, and sat back down in the bedside chair. Family present in room and with all needs in reach. Pt tolerated PT well and will continue to progress as tolerated. (P)   -BB       Row Name 02/07/25 1253          Positioning and Restraints    Pre-Treatment Position in bed (P)   -BB     Post Treatment Position chair (P)   -BB     In Chair notified nsg;reclined;call light within reach;encouraged to call for assist;with family/caregiver (P)   -BB               User Key  (r) = Recorded By, (t) = Taken By, (c) = Cosigned By      Initials Name Provider Type    Montana Olmos, PT Student PT Student                   Outcome Measures       Row Name 02/07/25 1300 02/07/25 0812       How much help from another person do you currently need...    Turning from your back to your side while in flat bed without using bedrails? 3 (P)   -BB 3  -JS    Moving from lying on back to sitting on the side of a flat bed without bedrails? 3 (P)   -BB 3  -JS    Moving to and from a bed to a chair (including a wheelchair)? 3 (P)   -BB 3  -JS    Standing up from a chair using your arms (e.g., wheelchair, bedside chair)? 3 (P)   -BB 3  -JS    Climbing 3-5 steps with a railing? 3 (P)   -BB 3  -JS    To walk in hospital room? 3 (P)   -BB 3  -JS    AM-PAC 6 Clicks Score (PT) 18 (P)   -BB 18  -JS    Highest Level of Mobility Goal 6 --> Walk 10 steps or more  (P)   -BB 6 --> Walk 10 steps or more  -KINZA              User Key  (r) = Recorded By, (t) = Taken By, (c) = Cosigned By      Initials Name Provider Type    Elena Kaiser, RN Registered Nurse    [place]ana Olmos, PT Student PT Student                                 Physical Therapy Education        No education to display                  PT Recommendation and Plan  Planned Therapy Interventions (PT): balance training, bed mobility training, gait training, home exercise program, patient/family education, ROM (range of motion), strengthening, stretching, transfer training  Progress: improving  Outcome Evaluation: (P) Pt seen for PT this AM. He is doing well, sitting up in bed upon entering to room, just finishing lunch. He reported not having any pain and feels he is progressing well. Pt performed supine to sit with CGA/min A x 1. Pt performed sit to stand with min A/HHA x 1. Pt then ambulated 300ft with min A/HHA x 1. Pt demos a slow, guarded, steady gait. Pt returned to room, and sat back down in the bedside chair. Family present in room and with all needs in reach. Pt tolerated PT well and will continue to progress as tolerated.     Time Calculation:         PT Charges       Row Name 02/07/25 1302             Time Calculation    Start Time 1140 (P)   -BB      Stop Time 1155 (P)   -BB      Time Calculation (min) 15 min (P)   -BB      PT Received On 02/07/25 (P)   -BB      PT - Next Appointment 02/08/25 (P)   -BB                User Key  (r) = Recorded By, (t) = Taken By, (c) = Cosigned By      Initials Name Provider Type    Montana Olmos, PT Student PT Student                      PT G-Codes  Outcome Measure Options: AM-PAC 6 Clicks Basic Mobility (PT)  AM-PAC 6 Clicks Score (PT): (P) 18  PT Discharge Summary  Anticipated Discharge Disposition (PT): home, home with assist, home with home health    Montana Gutierres PT Student  2/7/2025

## 2025-02-07 NOTE — PROGRESS NOTES
"    Patient Name: Roney Kuhn  :1950  75 y.o.      Patient Care Team:  Lizz Wiggins MD as PCP - General (Family Medicine)    Chief Complaint: AVR    Interval History: in chair feeling better       Objective   Vital Signs  Temp:  [97.7 °F (36.5 °C)-99 °F (37.2 °C)] 99 °F (37.2 °C)  Heart Rate:  [61-80] 80  Resp:  [17-18] 18  BP: (110-147)/(65-99) 147/86    Intake/Output Summary (Last 24 hours) at 2025 0900  Last data filed at 2025 0824  Gross per 24 hour   Intake 1036 ml   Output 815 ml   Net 221 ml     Flowsheet Rows      Flowsheet Row First Filed Value   Admission Height 185.4 cm (73\") Documented at 2025 031   Admission Weight 89.4 kg (197 lb 3.2 oz) Documented at 2025 031            Physical Exam:   General Appearance:    Alert, cooperative, in no acute distress   Lungs:     Clear to auscultation.  Normal respiratory effort and rate.      Heart:    Regular rhythm and normal rate, normal S1 and S2, no murmurs, gallops or rubs.     Chest Wall:    No abnormalities observed   Abdomen:     Soft, nontender, positive bowel sounds.     Extremities:   no cyanosis, clubbing or edema.  No marked joint deformities.  Adequate musculoskeletal strength.       Results Review:    Results from last 7 days   Lab Units 25  0357   SODIUM mmol/L 137   POTASSIUM mmol/L 4.0   CHLORIDE mmol/L 103   CO2 mmol/L 23.9   BUN mg/dL 25*   CREATININE mg/dL 0.96   GLUCOSE mg/dL 121*   CALCIUM mg/dL 8.5*         Results from last 7 days   Lab Units 25  0357   WBC 10*3/mm3 13.09*   HEMOGLOBIN g/dL 12.9*   HEMATOCRIT % 37.4*   PLATELETS 10*3/mm3 146     Results from last 7 days   Lab Units 25  0330 25  1500 25  1404 25  0742   INR  1.23* 1.44* 1.62* 0.92   APTT seconds  --  30.6 28.5 27.8     Results from last 7 days   Lab Units 25  0330   MAGNESIUM mg/dL 2.5*     Results from last 7 days   Lab Units 25  0742   CHOLESTEROL mg/dL 176   TRIGLYCERIDES mg/dL 72 "   HDL CHOL mg/dL 87*   LDL CHOL mg/dL 76               Medication Review:   aspirin, 81 mg, Oral, Daily  atorvastatin, 40 mg, Oral, Nightly  chlorhexidine, 15 mL, Mouth/Throat, Q12H  docusate sodium, 100 mg, Oral, BID  enoxaparin, 40 mg, Subcutaneous, Daily  furosemide, 40 mg, Oral, Daily  gabapentin, 100 mg, Oral, Q12H  guaiFENesin, 1,200 mg, Oral, Q12H  insulin lispro, 2-7 Units, Subcutaneous, 4x Daily AC & at Bedtime  magnesium sulfate, 2 g, Intravenous, Once  metoprolol tartrate, 50 mg, Oral, Q12H  mupirocin, 1 Application, Each Nare, BID  pantoprazole, 40 mg, Oral, QAM  polyethylene glycol, 17 g, Oral, Daily  potassium chloride, 20 mEq, Oral, Daily  senna-docusate sodium, 2 tablet, Oral, Nightly         niCARdipine, 5-15 mg/hr, Last Rate: 2 mg/hr (02/06/25 1001)        Assessment & Plan   Active Hospital Problems    Diagnosis  POA    **Persistent atrial fibrillation [I48.19]  Unknown    Aortic stenosis [I35.0]  Yes      Resolved Hospital Problems   No resolved problems to display.     1.  Aortic valve insufficiency status post tissue AVR (29 mm magna ease pericardial prosthesis ) with left atrial appendage clip to 2/5/25  2.  Paroxysmal atrial fibrillation, has been on chronic anticoagulation as an outpatient.  Pacemaker currently in place, appears to be sinus underneath the pacemaker.  3.  Hypertension, follow, beta blocker increased       Gurvinder Tapia III, MD  [facility]  02/07/25  09:00 EST

## 2025-02-07 NOTE — PLAN OF CARE
Goal Outcome Evaluation:  Plan of Care Reviewed With: patient        Progress: improving  Outcome Evaluation: Pt s/p POD 2 AV Replacement w/LAAC. In bed for brief period however spent remainder of night in chair. NSR on monitor, RA with good saturation, other VSS,  chest tubes in place, output documented in flowsheet. Encouraging fluid intake. Lab results reviewed. No acute concerns at this time. Will continue to monitor

## 2025-02-07 NOTE — PLAN OF CARE
Goal Outcome Evaluation:      Pt alert and oriented and follows commands. S/P AVR POD#2. Pain meds given as per chart. IJ and F/C dc , due to void around 9pm. CT x2 mid Y together. Wires AAI 54.5.0.5. on RA. sats 95 to 97% ,ambulated with 1 asst tolerating well. Pt went to a fib 80's 90'around 1622 today IV amio started with no bolus as per order. Plan of care in progress

## 2025-02-07 NOTE — DISCHARGE PLACEMENT REQUEST
"Shanna Franco \"MARIANELA\" (75 y.o. Male)       Date of Birth   1950    Social Security Number       Address   [address] [place] [address]    Home Phone   [phone]    MRN   [id number]       Hinduism   Nondenominational    Marital Status                               Admission Date   2/5/25    Admission Type   Elective    Admitting Provider   Prasanth Martinez MD    Attending Provider   Prasanth Martinez MD    Department, Room/Bed   [facility] CARDIOVASCULAR CARE UNIT, 2219/1       Discharge Date       Discharge Disposition       Discharge Destination                                 Attending Provider: Prasanth Martinez MD    Allergies: No Known Allergies    Isolation: None   Infection: None   Code Status: CPR    Ht: 185.4 cm (73\")   Wt: 89.7 kg (197 lb 11.2 oz)    Admission Cmt: None   Principal Problem: Persistent atrial fibrillation [I48.19]                   Active Insurance as of 2/5/2025       Primary Coverage       Payor Plan Insurance Group Employer/Plan Group    HUMANA MEDICARE REPLACEMENT HUMANA MED ADV HMO [id number]       Payor Plan Address Payor Plan Phone Number Payor Plan Fax Number Effective Dates    [address] [address] [phone]  1/1/2023 - None Entered    [facility] [address]         Subscriber Name Subscriber Birth Date Member ID       SHANNA FRANCO 1950 [id number]                     Emergency Contacts        (Rel.) Home Phone Work Phone Mobile Phone    Cristine Franco (Spouse) [phone] -- [phone]    MARIANELA OTERO (Son) -- -- [phone]    PHYLICIA OTERO (Son) -- -- [phone]              "

## 2025-02-08 ENCOUNTER — APPOINTMENT (OUTPATIENT)
Dept: GENERAL RADIOLOGY | Facility: HOSPITAL | Age: 75
DRG: 220 | End: 2025-02-08
Payer: MEDICARE

## 2025-02-08 LAB
ANION GAP SERPL CALCULATED.3IONS-SCNC: 8.1 MMOL/L (ref 5–15)
BUN SERPL-MCNC: 20 MG/DL (ref 8–23)
BUN/CREAT SERPL: 21.7 (ref 7–25)
CALCIUM SPEC-SCNC: 8.6 MG/DL (ref 8.6–10.5)
CHLORIDE SERPL-SCNC: 100 MMOL/L (ref 98–107)
CO2 SERPL-SCNC: 24.9 MMOL/L (ref 22–29)
CREAT SERPL-MCNC: 0.92 MG/DL (ref 0.76–1.27)
DEPRECATED RDW RBC AUTO: 42.8 FL (ref 37–54)
EGFRCR SERPLBLD CKD-EPI 2021: 85.7 ML/MIN/1.73
ERYTHROCYTE [DISTWIDTH] IN BLOOD BY AUTOMATED COUNT: 12.9 % (ref 12.3–15.4)
GLUCOSE BLDC GLUCOMTR-MCNC: 101 MG/DL (ref 70–130)
GLUCOSE BLDC GLUCOMTR-MCNC: 104 MG/DL (ref 70–130)
GLUCOSE BLDC GLUCOMTR-MCNC: 111 MG/DL (ref 70–130)
GLUCOSE BLDC GLUCOMTR-MCNC: 143 MG/DL (ref 70–130)
GLUCOSE SERPL-MCNC: 119 MG/DL (ref 65–99)
HCT VFR BLD AUTO: 37.9 % (ref 37.5–51)
HGB BLD-MCNC: 13.5 G/DL (ref 13–17.7)
MCH RBC QN AUTO: 32.5 PG (ref 26.6–33)
MCHC RBC AUTO-ENTMCNC: 35.6 G/DL (ref 31.5–35.7)
MCV RBC AUTO: 91.3 FL (ref 79–97)
PLATELET # BLD AUTO: 153 10*3/MM3 (ref 140–450)
PMV BLD AUTO: 10 FL (ref 6–12)
POTASSIUM SERPL-SCNC: 3.8 MMOL/L (ref 3.5–5.2)
POTASSIUM SERPL-SCNC: 4.1 MMOL/L (ref 3.5–5.2)
QT INTERVAL: 388 MS
QTC INTERVAL: 434 MS
RBC # BLD AUTO: 4.15 10*6/MM3 (ref 4.14–5.8)
SODIUM SERPL-SCNC: 133 MMOL/L (ref 136–145)
WBC NRBC COR # BLD AUTO: 9.92 10*3/MM3 (ref 3.4–10.8)

## 2025-02-08 PROCEDURE — 93010 ELECTROCARDIOGRAM REPORT: CPT | Performed by: INTERNAL MEDICINE

## 2025-02-08 PROCEDURE — 71045 X-RAY EXAM CHEST 1 VIEW: CPT

## 2025-02-08 PROCEDURE — 94762 N-INVAS EAR/PLS OXIMTRY CONT: CPT

## 2025-02-08 PROCEDURE — 85027 COMPLETE CBC AUTOMATED: CPT | Performed by: NURSE PRACTITIONER

## 2025-02-08 PROCEDURE — 99024 POSTOP FOLLOW-UP VISIT: CPT

## 2025-02-08 PROCEDURE — 25010000002 AMIODARONE IN DEXTROSE 5% 360-4.14 MG/200ML-% SOLUTION

## 2025-02-08 PROCEDURE — 99232 SBSQ HOSP IP/OBS MODERATE 35: CPT | Performed by: INTERNAL MEDICINE

## 2025-02-08 PROCEDURE — 80048 BASIC METABOLIC PNL TOTAL CA: CPT | Performed by: NURSE PRACTITIONER

## 2025-02-08 PROCEDURE — 93005 ELECTROCARDIOGRAM TRACING: CPT | Performed by: THORACIC SURGERY (CARDIOTHORACIC VASCULAR SURGERY)

## 2025-02-08 PROCEDURE — 84132 ASSAY OF SERUM POTASSIUM: CPT | Performed by: THORACIC SURGERY (CARDIOTHORACIC VASCULAR SURGERY)

## 2025-02-08 PROCEDURE — 25010000002 ENOXAPARIN PER 10 MG: Performed by: NURSE PRACTITIONER

## 2025-02-08 PROCEDURE — 97530 THERAPEUTIC ACTIVITIES: CPT

## 2025-02-08 PROCEDURE — 82948 REAGENT STRIP/BLOOD GLUCOSE: CPT

## 2025-02-08 RX ORDER — POTASSIUM CHLORIDE 750 MG/1
20 TABLET, FILM COATED, EXTENDED RELEASE ORAL ONCE
Status: COMPLETED | OUTPATIENT
Start: 2025-02-08 | End: 2025-02-08

## 2025-02-08 RX ORDER — AMIODARONE HYDROCHLORIDE 200 MG/1
400 TABLET ORAL EVERY 12 HOURS SCHEDULED
Status: DISCONTINUED | OUTPATIENT
Start: 2025-02-08 | End: 2025-02-09 | Stop reason: HOSPADM

## 2025-02-08 RX ADMIN — PANTOPRAZOLE SODIUM 40 MG: 40 TABLET, DELAYED RELEASE ORAL at 03:24

## 2025-02-08 RX ADMIN — HYDROCODONE BITARTRATE AND ACETAMINOPHEN 2 TABLET: 5; 325 TABLET ORAL at 03:25

## 2025-02-08 RX ADMIN — GABAPENTIN 100 MG: 100 CAPSULE ORAL at 08:03

## 2025-02-08 RX ADMIN — FUROSEMIDE 40 MG: 40 TABLET ORAL at 08:03

## 2025-02-08 RX ADMIN — AMIODARONE HYDROCHLORIDE 400 MG: 200 TABLET ORAL at 09:21

## 2025-02-08 RX ADMIN — METOPROLOL TARTRATE 50 MG: 50 TABLET, FILM COATED ORAL at 20:36

## 2025-02-08 RX ADMIN — AMIODARONE HYDROCHLORIDE 0.5 MG/MIN: 1.8 INJECTION, SOLUTION INTRAVENOUS at 08:02

## 2025-02-08 RX ADMIN — GUAIFENESIN 1200 MG: 600 TABLET, MULTILAYER, EXTENDED RELEASE ORAL at 08:03

## 2025-02-08 RX ADMIN — ENOXAPARIN SODIUM 40 MG: 100 INJECTION SUBCUTANEOUS at 15:50

## 2025-02-08 RX ADMIN — AMIODARONE HYDROCHLORIDE 400 MG: 200 TABLET ORAL at 20:35

## 2025-02-08 RX ADMIN — METOPROLOL TARTRATE 50 MG: 50 TABLET, FILM COATED ORAL at 08:02

## 2025-02-08 RX ADMIN — MUPIROCIN 1 APPLICATION: 20 OINTMENT TOPICAL at 20:37

## 2025-02-08 RX ADMIN — POTASSIUM CHLORIDE 20 MEQ: 750 TABLET, EXTENDED RELEASE ORAL at 08:03

## 2025-02-08 RX ADMIN — CYCLOBENZAPRINE HYDROCHLORIDE 10 MG: 10 TABLET, FILM COATED ORAL at 20:36

## 2025-02-08 RX ADMIN — GUAIFENESIN 1200 MG: 600 TABLET, MULTILAYER, EXTENDED RELEASE ORAL at 20:35

## 2025-02-08 RX ADMIN — GABAPENTIN 100 MG: 100 CAPSULE ORAL at 20:36

## 2025-02-08 RX ADMIN — POTASSIUM CHLORIDE 20 MEQ: 750 TABLET, EXTENDED RELEASE ORAL at 08:02

## 2025-02-08 RX ADMIN — ATORVASTATIN CALCIUM 40 MG: 20 TABLET, FILM COATED ORAL at 20:35

## 2025-02-08 RX ADMIN — ASPIRIN 81 MG: 81 TABLET, COATED ORAL at 08:02

## 2025-02-08 RX ADMIN — HYDROCODONE BITARTRATE AND ACETAMINOPHEN 2 TABLET: 5; 325 TABLET ORAL at 20:36

## 2025-02-08 NOTE — PROGRESS NOTES
"    Patient Name: Roney Kuhn  :1950  75 y.o.      Patient Care Team:  Lizz Wiggins MD as PCP - General (Family Medicine)    Chief Complaint: AVR    Interval History: Developed A-fib yesterday afternoon, started on IV amiodarone.       Objective   Vital Signs  Temp:  [98.4 °F (36.9 °C)-99.6 °F (37.6 °C)] 98.4 °F (36.9 °C)  Heart Rate:  [71-86] 76  Resp:  [18] 18  BP: (107-151)/(76-87) 107/86    Intake/Output Summary (Last 24 hours) at 2025 0849  Last data filed at 2025 0800  Gross per 24 hour   Intake 690 ml   Output 1635 ml   Net -945 ml     Flowsheet Rows      Flowsheet Row First Filed Value   Admission Height 185.4 cm (73\") Documented at 2025   Admission Weight 89.4 kg (197 lb 3.2 oz) Documented at 2025 0314            Physical Exam:   General Appearance:    Alert, cooperative, in no acute distress   Lungs:     Clear to auscultation.  Normal respiratory effort and rate.      Heart:    irregular rhythm and normal rate, normal S1 and S2, no murmurs, gallops or rubs.     Chest Wall:    No abnormalities observed   Abdomen:     Soft, nontender, positive bowel sounds.     Extremities:   no cyanosis, clubbing or edema.  No marked joint deformities.  Adequate musculoskeletal strength.       Results Review:    Results from last 7 days   Lab Units 25  0623   SODIUM mmol/L 133*   POTASSIUM mmol/L 3.8   CHLORIDE mmol/L 100   CO2 mmol/L 24.9   BUN mg/dL 20   CREATININE mg/dL 0.92   GLUCOSE mg/dL 119*   CALCIUM mg/dL 8.6         Results from last 7 days   Lab Units 25  0623   WBC 10*3/mm3 9.92   HEMOGLOBIN g/dL 13.5   HEMATOCRIT % 37.9   PLATELETS 10*3/mm3 153     Results from last 7 days   Lab Units 25  0330 25  1500 25  1404 25  0742   INR  1.23* 1.44* 1.62* 0.92   APTT seconds  --  30.6 28.5 27.8     Results from last 7 days   Lab Units 25  0330   MAGNESIUM mg/dL 2.5*     Results from last 7 days   Lab Units 25  0742 "   CHOLESTEROL mg/dL 176   TRIGLYCERIDES mg/dL 72   HDL CHOL mg/dL 87*   LDL CHOL mg/dL 76               Medication Review:   aspirin, 81 mg, Oral, Daily  atorvastatin, 40 mg, Oral, Nightly  chlorhexidine, 15 mL, Mouth/Throat, Q12H  docusate sodium, 100 mg, Oral, BID  enoxaparin, 40 mg, Subcutaneous, Daily  furosemide, 40 mg, Oral, Daily  gabapentin, 100 mg, Oral, Q12H  guaiFENesin, 1,200 mg, Oral, Q12H  insulin lispro, 2-7 Units, Subcutaneous, 4x Daily AC & at Bedtime  metoprolol tartrate, 50 mg, Oral, Q12H  mupirocin, 1 Application, Each Nare, BID  pantoprazole, 40 mg, Oral, QAM  polyethylene glycol, 17 g, Oral, Daily  potassium chloride, 20 mEq, Oral, Daily  senna-docusate sodium, 2 tablet, Oral, Nightly         amiodarone, 0.5 mg/min, Last Rate: 0.5 mg/min (02/08/25 0802)  niCARdipine, 5-15 mg/hr, Last Rate: 2 mg/hr (02/06/25 1001)        Assessment & Plan   Active Hospital Problems    Diagnosis  POA    **Persistent atrial fibrillation [I48.19]  Unknown    Aortic stenosis [I35.0]  Yes      Resolved Hospital Problems   No resolved problems to display.     1.  Aortic valve insufficiency status post tissue AVR (29 mm magna ease pericardial prosthesis ) with left atrial appendage clip to 2/5/25  2.  Paroxysmal atrial fibrillation, has been on chronic anticoagulation as an outpatient.  Pacemaker currently in place, was initially sinus after surgery but went into atrial fibrillation yesterday, started on IV amiodarone without a bolus.  I will transition to oral amnio this morning.  3.  Hypertension, doing well       Gurvinder Tapia, III, MD  [facility]  02/08/25  08:49 EST

## 2025-02-08 NOTE — PROGRESS NOTES
" LOS: 3 days   Patient Care Team:  Lizz Wiggins MD as PCP - General (Family Medicine)    Chief Complaint: post-op AVR/ROBLES ligation     Subjective    Patient looks great this morning, has been ambulating well, reports incisional pain that is well controlled     Vital Signs  Temp:  [98.4 °F (36.9 °C)-99.6 °F (37.6 °C)] 98.4 °F (36.9 °C)  Heart Rate:  [71-86] 76  Resp:  [18] 18  BP: (107-151)/(76-87) 107/86  Body mass index is 26.12 kg/m².    Intake/Output Summary (Last 24 hours) at 2/8/2025 0820  Last data filed at 2/8/2025 0800  Gross per 24 hour   Intake 1140 ml   Output 1635 ml   Net -495 ml     I/O this shift:  In: 240 [P.O.:240]  Out: 210 [Urine:200; Chest Tube:10]    Chest tube drainage last 8 hours 20ml        02/07/25  0314 02/07/25  0634 02/08/25  0624   Weight: 89.4 kg (197 lb 3.2 oz) 89.7 kg (197 lb 11.2 oz) 89.8 kg (198 lb)         Objective:  General Appearance:  Comfortable and in no acute distress.    Vital signs: (most recent): Blood pressure 114/87, pulse 78, temperature 98.2 °F (36.8 °C), temperature source Oral, resp. rate 16, height 185.4 cm (73\"), weight 89.8 kg (198 lb), SpO2 97%.  Vital signs are normal.  No fever.    Output: Producing urine and producing stool.    Lungs:  Normal effort and normal respiratory rate.  There are decreased breath sounds.    Heart: Normal rate.  Irregular rhythm.    Abdomen: Abdomen is soft.  Bowel sounds are normal.     Extremities: Normal range of motion.  There is no dependent edema.    Neurological: Patient is alert and oriented to person, place and time.    Skin:  Warm and dry.  (Sternal dressing c/d/i)                   Results Review:        WBC WBC   Date Value Ref Range Status   02/08/2025 9.92 3.40 - 10.80 10*3/mm3 Final   02/07/2025 13.09 (H) 3.40 - 10.80 10*3/mm3 Final   02/06/2025 9.02 3.40 - 10.80 10*3/mm3 Final   02/05/2025 11.41 (H) 3.40 - 10.80 10*3/mm3 Final   02/05/2025 9.18 3.40 - 10.80 10*3/mm3 Final   02/05/2025 6.01 3.40 - 10.80 10*3/mm3 " Final      HGB Hemoglobin   Date Value Ref Range Status   02/08/2025 13.5 13.0 - 17.7 g/dL Final   02/07/2025 12.9 (L) 13.0 - 17.7 g/dL Final   02/06/2025 12.3 (L) 13.0 - 17.7 g/dL Final   02/05/2025 13.5 13.0 - 17.7 g/dL Final   02/05/2025 12.4 (L) 13.0 - 17.7 g/dL Final   02/05/2025 10.2 (L) 12.0 - 17.0 g/dL Final   02/05/2025 11.0 (L) 13.0 - 17.7 g/dL Final   02/05/2025 11.6 (L) 12.0 - 17.0 g/dL Final   02/05/2025 11.2 (L) 12.0 - 17.0 g/dL Final   02/05/2025 10.5 (L) 12.0 - 17.0 g/dL Final   02/05/2025 12.2 12.0 - 17.0 g/dL Final      HCT Hematocrit   Date Value Ref Range Status   02/08/2025 37.9 37.5 - 51.0 % Final   02/07/2025 37.4 (L) 37.5 - 51.0 % Final   02/06/2025 35.2 (L) 37.5 - 51.0 % Final   02/05/2025 38.0 37.5 - 51.0 % Final   02/05/2025 36.0 (L) 37.5 - 51.0 % Final   02/05/2025 30 (L) 38 - 51 % Final   02/05/2025 31.5 (L) 37.5 - 51.0 % Final   02/05/2025 34 (L) 38 - 51 % Final   02/05/2025 33 (L) 38 - 51 % Final   02/05/2025 31 (L) 38 - 51 % Final   02/05/2025 36 (L) 38 - 51 % Final      Platelets Platelets   Date Value Ref Range Status   02/08/2025 153 140 - 450 10*3/mm3 Final   02/07/2025 146 140 - 450 10*3/mm3 Final   02/06/2025 160 140 - 450 10*3/mm3 Final   02/05/2025 147 140 - 450 10*3/mm3 Final   02/05/2025 147 140 - 450 10*3/mm3 Final   02/05/2025 101 (L) 140 - 450 10*3/mm3 Final   02/05/2025 154 140 - 450 10*3/mm3 Final        PT/INR:    Protime   Date Value Ref Range Status   02/06/2025 15.4 (H) 11.7 - 14.2 Seconds Final   02/05/2025 17.6 (H) 11.7 - 14.2 Seconds Final   02/05/2025 19.3 (H) 11.7 - 14.2 Seconds Final   /  INR   Date Value Ref Range Status   02/06/2025 1.23 (H) 0.90 - 1.10 Final   02/05/2025 1.44 (H) 0.90 - 1.10 Final   02/05/2025 1.62 (H) 0.90 - 1.10 Final       Sodium Sodium   Date Value Ref Range Status   02/08/2025 133 (L) 136 - 145 mmol/L Final   02/07/2025 137 136 - 145 mmol/L Final   02/06/2025 142 136 - 145 mmol/L Final   02/05/2025 140 136 - 145 mmol/L Final    02/05/2025 139 136 - 145 mmol/L Final      Potassium Potassium   Date Value Ref Range Status   02/08/2025 3.8 3.5 - 5.2 mmol/L Final   02/07/2025 4.0 3.5 - 5.2 mmol/L Final   02/06/2025 3.8 3.5 - 5.2 mmol/L Final   02/05/2025 3.7 3.5 - 5.2 mmol/L Final   02/05/2025 3.6 3.5 - 5.2 mmol/L Final   02/05/2025 3.9 3.5 - 5.2 mmol/L Final      Chloride Chloride   Date Value Ref Range Status   02/08/2025 100 98 - 107 mmol/L Final   02/07/2025 103 98 - 107 mmol/L Final   02/06/2025 109 (H) 98 - 107 mmol/L Final   02/05/2025 107 98 - 107 mmol/L Final   02/05/2025 104 98 - 107 mmol/L Final      Bicarbonate CO2   Date Value Ref Range Status   02/08/2025 24.9 22.0 - 29.0 mmol/L Final   02/07/2025 23.9 22.0 - 29.0 mmol/L Final   02/06/2025 22.4 22.0 - 29.0 mmol/L Final   02/05/2025 20.0 (L) 22.0 - 29.0 mmol/L Final   02/05/2025 22.2 22.0 - 29.0 mmol/L Final      BUN BUN   Date Value Ref Range Status   02/08/2025 20 8 - 23 mg/dL Final   02/07/2025 25 (H) 8 - 23 mg/dL Final   02/06/2025 15 8 - 23 mg/dL Final   02/05/2025 15 8 - 23 mg/dL Final   02/05/2025 16 8 - 23 mg/dL Final      Creatinine Creatinine   Date Value Ref Range Status   02/08/2025 0.92 0.76 - 1.27 mg/dL Final   02/07/2025 0.96 0.76 - 1.27 mg/dL Final   02/06/2025 0.83 0.76 - 1.27 mg/dL Final   02/05/2025 0.82 0.76 - 1.27 mg/dL Final   02/05/2025 0.94 0.76 - 1.27 mg/dL Final      Calcium Calcium   Date Value Ref Range Status   02/08/2025 8.6 8.6 - 10.5 mg/dL Final   02/07/2025 8.5 (L) 8.6 - 10.5 mg/dL Final   02/06/2025 8.0 (L) 8.6 - 10.5 mg/dL Final   02/05/2025 8.5 (L) 8.6 - 10.5 mg/dL Final   02/05/2025 7.7 (L) 8.6 - 10.5 mg/dL Final      Magnesium Magnesium   Date Value Ref Range Status   02/06/2025 2.5 (H) 1.6 - 2.4 mg/dL Final   02/05/2025 2.2 1.6 - 2.4 mg/dL Final   02/05/2025 2.5 (H) 1.6 - 2.4 mg/dL Final          aspirin, 81 mg, Oral, Daily  atorvastatin, 40 mg, Oral, Nightly  chlorhexidine, 15 mL, Mouth/Throat, Q12H  docusate sodium, 100 mg, Oral,  BID  enoxaparin, 40 mg, Subcutaneous, Daily  furosemide, 40 mg, Oral, Daily  gabapentin, 100 mg, Oral, Q12H  guaiFENesin, 1,200 mg, Oral, Q12H  insulin lispro, 2-7 Units, Subcutaneous, 4x Daily AC & at Bedtime  metoprolol tartrate, 50 mg, Oral, Q12H  mupirocin, 1 Application, Each Nare, BID  pantoprazole, 40 mg, Oral, QAM  polyethylene glycol, 17 g, Oral, Daily  potassium chloride, 20 mEq, Oral, Daily  senna-docusate sodium, 2 tablet, Oral, Nightly      amiodarone, 0.5 mg/min, Last Rate: 0.5 mg/min (02/08/25 0802)  niCARdipine, 5-15 mg/hr, Last Rate: 2 mg/hr (02/06/25 1001)              Persistent atrial fibrillation    Aortic stenosis      Assessment & Plan    -Aortic valve insuffiencey- AVR (tissue), left atrial appendage clip- Juan 2/5/2025  -Atrial fibrillation on eliquis - last dose 1/29  -Hypertension  -Hyperlipidemia  -Post op anemia- expected acute blood loss     POD3:     Patient in a.fib with rate in the 70's, rate has remained normal and BP stable   Amiodarone gtt discontinued and patient placed on oral amiodarone   Patient on RA, will order overnight oximetry  Discontinue chest tubes and AV wires   Continue beta blocker and oral diuretic  Encourage IS/flutter valve, continue routine care   Expect discharge soon, likely home with HH in the next 1-2days   Patient was on Eliquis at home, will discuss with Dr. Juan Hargrove PA-C  02/08/25  08:20 EST     13-Oct-2021

## 2025-02-08 NOTE — PLAN OF CARE
Goal Outcome Evaluation:  Plan of Care Reviewed With: patient        Progress: improving  Outcome Evaluation: Pt in bed at start of session and agreeable to work with PT. Pt was able to perform bed mobility with SBA and perform seated ther ex at EOB. Pt ambulates with HHA x 1 and ZBIGNIEW Crawford for equipment management. Pt demo's slow gait, but overall improving. Able to ambulate 300' before returning to room, RN requesting pt back in bed. Will continue to progress as able.

## 2025-02-08 NOTE — THERAPY TREATMENT NOTE
Patient Name: Roney Kuhn  : 1950    MRN: [id number]                              Today's Date: 2025       Admit Date: 2025    Visit Dx:     ICD-10-CM ICD-9-CM   1. S/P AVR (aortic valve replacement)  Z95.2 V43.3   2. Persistent atrial fibrillation  I48.19 427.31     Patient Active Problem List   Diagnosis    Fatigue    Primary hypertension    Hypercholesterolemia    Overactive bladder    Chronic nasal congestion    Hyperglycemia    History of nephrolithiasis    History of 2019 novel coronavirus disease (COVID-19)    Chronic pain of both shoulders    Left upper quadrant abdominal mass    Localized skin mass, lump, or swelling    Primary insomnia    Abnormal laboratory test result    Persistent atrial fibrillation    Nonrheumatic aortic valve insufficiency    Preoperative cardiovascular examination    Aortic stenosis     Past Medical History:   Diagnosis Date    Aortic root dilation     Aortic valve insufficiency     Arthritis     Atrial fibrillation     Back problem     Basal cell carcinoma     FACE AND LT EAR    Cardiomyopathy     EF 45-50 % in 2024 on scanned media    Claustrophobia     Hyperlipidemia     Hypertension     Kidney stones      Past Surgical History:   Procedure Laterality Date    AORTIC VALVE REPAIR/REPLACEMENT N/A 2025    Procedure: AORTIC VALVE REPLACEMENT; LEFT ATRIAL APPENDAGE EXCLUSION WITH ATRICLIP; PRP WITH TRANSESOPHAGEAL ECHOCARDIOGRAM;  Surgeon: Prasanth Martinez MD;  Location: [facility];  Service: Cardiothoracic;  Laterality: N/A;    APPENDECTOMY  1980    BASAL CELL CARCINOMA EXCISION      REMOVED FROM FACE   LOCAL    CARDIAC CATHETERIZATION N/A 2024    Procedure: Left Heart Cath;  Surgeon: Deisy Keller MD;  Location: [facility] INVASIVE LOCATION;  Service: Cardiovascular;  Laterality: N/A;  preop- aortic valve surgery- rec from Dr. Martinez    CARDIAC CATHETERIZATION N/A 2024    Procedure: Coronary angiography;  Surgeon: Deisy Keller  MD;  Location: [facility] INVASIVE LOCATION;  Service: Cardiovascular;  Laterality: N/A;    CATARACT EXTRACTION W/ INTRAOCULAR LENS IMPLANT Bilateral 2015    COLONOSCOPY  2017    Date unknown    INGUINAL HERNIA REPAIR Bilateral     KIDNEY STONE SURGERY  2021    KNEE ARTHROSCOPY W/ MENISCAL REPAIR Right 2014    ROTATOR CUFF REPAIR Right 2009    WISDOM TOOTH EXTRACTION        General Information       Row Name 02/08/25 1101          Physical Therapy Time and Intention    Document Type therapy note (daily note)  -DB     Mode of Treatment physical therapy;individual therapy  -DB       Row Name 02/08/25 1101          General Information    Patient Profile Reviewed yes  -DB               User Key  (r) = Recorded By, (t) = Taken By, (c) = Cosigned By      Initials Name Provider Type    DB Emelina Barker PT Physical Therapist                   Mobility       Row Name 02/08/25 1101          Bed Mobility    Bed Mobility supine-sit;sit-supine  -DB     Supine-Sit [place] (Bed Mobility) standby assist  -DB     Sit-Supine [place] (Bed Mobility) standby assist  -DB     Assistive Device (Bed Mobility) head of bed elevated;bed rails  -DB       Row Name 02/08/25 1101          Sit-Stand Transfer    Sit-Stand [place] (Transfers) contact guard  -DB     Assistive Device (Sit-Stand Transfers) other (see comments)  HHA  -DB       Row Name 02/08/25 1101          Gait/Stairs (Locomotion)    [place] Level (Gait) verbal cues;minimum assist (75% patient effort);2 person assist;1 person to manage equipment  -DB     Assistive Device (Gait) other (see comments)  HHA  -DB     Distance in Feet (Gait) 300  -DB     Deviations/Abnormal Patterns (Gait) amaury decreased;gait speed decreased;stride length decreased  -DB     Bilateral Gait Deviations forward flexed posture;decreased arm swing;heel strike decreased  -DB               User Key  (r) = Recorded By, (t) = Taken By, (c) = Cosigned By      Initials Name Provider Type     Emelina Benavides PT Physical Therapist                   Obj/Interventions       Row Name 02/08/25 1102          Motor Skills    Therapeutic Exercise other (see comments)  cardiac protocol x 10  -DB       Row Name 02/08/25 1102          Balance    Balance Assessment sitting static balance;sitting dynamic balance;standing static balance;standing dynamic balance  -DB     Static Sitting Balance standby assist  -DB     Dynamic Sitting Balance standby assist  -DB     Position, Sitting Balance sitting edge of bed  -DB     Static Standing Balance contact guard  -DB     Dynamic Standing Balance minimal assist  -DB     Position/Device Used, Standing Balance supported  -DB     Balance Interventions sitting;standing;sit to stand  -DB               User Key  (r) = Recorded By, (t) = Taken By, (c) = Cosigned By      Initials Name Provider Type    Emelina Benavides, MELITON Physical Therapist                   Goals/Plan    No documentation.                  Clinical Impression       Row Name 02/08/25 1102          Plan of Care Review    Plan of Care Reviewed With patient  -DB     Progress improving  -DB     Outcome Evaluation Pt in bed at start of session and agreeable to work with PT. Pt was able to perform bed mobility with SBA and perform seated ther ex at EOB. Pt ambulates with HHA x 1 and ZBIGNIEW Crawford for equipment management. Pt demo's slow gait, but overall improving. Able to ambulate 300' before returning to room, RN requesting pt back in bed. Will continue to progress as able.  -DB       Row Name 02/08/25 1102          Vital Signs    O2 Delivery Pre Treatment room air  -DB     Intra SpO2 (%) 86  -DB     O2 Delivery Intra Treatment room air  -DB     Post SpO2 (%) 92  -DB     O2 Delivery Post Treatment room air  -DB     Pre Patient Position Supine  -DB     Intra Patient Position Standing  -DB     Post Patient Position Supine  -DB       Row Name 02/08/25 1102          Positioning and Restraints    Pre-Treatment Position in  bed  -DB     Post Treatment Position bed  -DB     In Bed supine;call light within reach;encouraged to call for assist;with nsg;with family/caregiver  -DB               User Key  (r) = Recorded By, (t) = Taken By, (c) = Cosigned By      Initials Name Provider Type    DB Emelina Barker, MELITON Physical Therapist                   Outcome Measures       Row Name 02/08/25 1119 02/08/25 0800       How much help from another person do you currently need...    Turning from your back to your side while in flat bed without using bedrails? 4  -DB 3  -SF    Moving from lying on back to sitting on the side of a flat bed without bedrails? 3  -DB 3  -SF    Moving to and from a bed to a chair (including a wheelchair)? 3  -DB 3  -SF    Standing up from a chair using your arms (e.g., wheelchair, bedside chair)? 3  -DB 3  -SF    Climbing 3-5 steps with a railing? 3  -DB 3  -SF    To walk in hospital room? 3  -DB 3  -SF    AM-PAC 6 Clicks Score (PT) 19  -DB 18  -SF    Highest Level of Mobility Goal 6 --> Walk 10 steps or more  -DB 6 --> Walk 10 steps or more  -SF      Row Name 02/08/25 1119          Functional Assessment    Outcome Measure Options AM-PAC 6 Clicks Basic Mobility (PT)  -DB               User Key  (r) = Recorded By, (t) = Taken By, (c) = Cosigned By      Initials Name Provider Type    DB Emelian Barker, PT Physical Therapist    SF Darcy Berry, RN Registered Nurse                                 Physical Therapy Education       Title: PT OT SLP Therapies (Done)       Topic: Physical Therapy (Done)       Point: Mobility training (Done)       Learning Progress Summary            Patient Acceptance, E, VU by DB at 2/8/2025 1120                      Point: Home exercise program (Done)       Learning Progress Summary            Patient Acceptance, E, VU by DB at 2/8/2025 1120                      Point: Body mechanics (Done)       Learning Progress Summary            Patient Acceptance, E, VU by DB at 2/8/2025 1120                       Point: Precautions (Done)       Learning Progress Summary            Patient Acceptance, E, VU by DB at 2/8/2025 1120                                      User Key       Initials Effective Dates Name Provider Type Discipline     06/16/21 -  Emelina Barker PT Physical Therapist PT                  PT Recommendation and Plan     Progress: improving  Outcome Evaluation: Pt in bed at start of session and agreeable to work with PT. Pt was able to perform bed mobility with SBA and perform seated ther ex at EOB. Pt ambulates with HHA x 1 and Ty, TS for equipment management. Pt demo's slow gait, but overall improving. Able to ambulate 300' before returning to room, RN requesting pt back in bed. Will continue to progress as able.     Time Calculation:         PT Charges       Row Name 02/08/25 1120             Time Calculation    Start Time 0951  -DB      Stop Time 1003  -DB      Time Calculation (min) 12 min  -DB      PT Received On 02/08/25  -DB      PT - Next Appointment 02/09/25  -DB         Time Calculation- PT    Total Timed Code Minutes- PT 12 minute(s)  -DB                User Key  (r) = Recorded By, (t) = Taken By, (c) = Cosigned By      Initials Name Provider Type    DB Emelina Barker PT Physical Therapist                  Therapy Charges for Today       Code Description Service Date Service Provider Modifiers Qty    18394419773 HC PT THER SUPP EA 15 MIN 2/8/2025 Emelina Barker, PT GP 1    18906631562 HC PT THERAPEUTIC ACT EA 15 MIN 2/8/2025 Emelina Barker, PT GP 1            PT G-Codes  Outcome Measure Options: AM-PAC 6 Clicks Basic Mobility (PT)  AM-PAC 6 Clicks Score (PT): 19       Emelina Barker PT  2/8/2025

## 2025-02-08 NOTE — PLAN OF CARE
Goal Outcome Evaluation:         Received pt from CVICU late this afternoon. S/P AV replacement POD #3. Pt alert and oriented and follows commands.no c/o pain and discomfort . SR vitals stable plan of care in progress

## 2025-02-09 ENCOUNTER — APPOINTMENT (OUTPATIENT)
Dept: GENERAL RADIOLOGY | Facility: HOSPITAL | Age: 75
DRG: 220 | End: 2025-02-09
Payer: MEDICARE

## 2025-02-09 VITALS
OXYGEN SATURATION: 95 % | SYSTOLIC BLOOD PRESSURE: 134 MMHG | TEMPERATURE: 98 F | HEIGHT: 73 IN | RESPIRATION RATE: 16 BRPM | HEART RATE: 77 BPM | DIASTOLIC BLOOD PRESSURE: 86 MMHG | BODY MASS INDEX: 26.27 KG/M2 | WEIGHT: 198.2 LBS

## 2025-02-09 LAB
25(OH)D3 SERPL-MCNC: 25.5 NG/ML (ref 30–100)
ANION GAP SERPL CALCULATED.3IONS-SCNC: 9 MMOL/L (ref 5–15)
BUN SERPL-MCNC: 21 MG/DL (ref 8–23)
BUN/CREAT SERPL: 23.9 (ref 7–25)
CALCIUM SPEC-SCNC: 8.3 MG/DL (ref 8.6–10.5)
CHLORIDE SERPL-SCNC: 101 MMOL/L (ref 98–107)
CO2 SERPL-SCNC: 24 MMOL/L (ref 22–29)
CREAT SERPL-MCNC: 0.88 MG/DL (ref 0.76–1.27)
DEPRECATED RDW RBC AUTO: 42.7 FL (ref 37–54)
EGFRCR SERPLBLD CKD-EPI 2021: 88.6 ML/MIN/1.73
ERYTHROCYTE [DISTWIDTH] IN BLOOD BY AUTOMATED COUNT: 12.7 % (ref 12.3–15.4)
GLUCOSE BLDC GLUCOMTR-MCNC: 115 MG/DL (ref 70–130)
GLUCOSE BLDC GLUCOMTR-MCNC: 95 MG/DL (ref 70–130)
GLUCOSE SERPL-MCNC: 118 MG/DL (ref 65–99)
HCT VFR BLD AUTO: 33.9 % (ref 37.5–51)
HGB BLD-MCNC: 11.8 G/DL (ref 13–17.7)
MAGNESIUM SERPL-MCNC: 1.9 MG/DL (ref 1.6–2.4)
MCH RBC QN AUTO: 32.1 PG (ref 26.6–33)
MCHC RBC AUTO-ENTMCNC: 34.8 G/DL (ref 31.5–35.7)
MCV RBC AUTO: 92.1 FL (ref 79–97)
PLATELET # BLD AUTO: 153 10*3/MM3 (ref 140–450)
PMV BLD AUTO: 10.5 FL (ref 6–12)
POTASSIUM SERPL-SCNC: 3.6 MMOL/L (ref 3.5–5.2)
POTASSIUM SERPL-SCNC: 4.2 MMOL/L (ref 3.5–5.2)
RBC # BLD AUTO: 3.68 10*6/MM3 (ref 4.14–5.8)
SODIUM SERPL-SCNC: 134 MMOL/L (ref 136–145)
WBC NRBC COR # BLD AUTO: 7.62 10*3/MM3 (ref 3.4–10.8)

## 2025-02-09 PROCEDURE — 99024 POSTOP FOLLOW-UP VISIT: CPT

## 2025-02-09 PROCEDURE — 94618 PULMONARY STRESS TESTING: CPT

## 2025-02-09 PROCEDURE — 99232 SBSQ HOSP IP/OBS MODERATE 35: CPT | Performed by: INTERNAL MEDICINE

## 2025-02-09 PROCEDURE — 71046 X-RAY EXAM CHEST 2 VIEWS: CPT

## 2025-02-09 PROCEDURE — 83735 ASSAY OF MAGNESIUM: CPT

## 2025-02-09 PROCEDURE — 84132 ASSAY OF SERUM POTASSIUM: CPT | Performed by: THORACIC SURGERY (CARDIOTHORACIC VASCULAR SURGERY)

## 2025-02-09 PROCEDURE — 82948 REAGENT STRIP/BLOOD GLUCOSE: CPT

## 2025-02-09 PROCEDURE — 85027 COMPLETE CBC AUTOMATED: CPT | Performed by: NURSE PRACTITIONER

## 2025-02-09 PROCEDURE — 80048 BASIC METABOLIC PNL TOTAL CA: CPT | Performed by: NURSE PRACTITIONER

## 2025-02-09 PROCEDURE — 94799 UNLISTED PULMONARY SVC/PX: CPT

## 2025-02-09 PROCEDURE — 82306 VITAMIN D 25 HYDROXY: CPT

## 2025-02-09 RX ORDER — AMIODARONE HYDROCHLORIDE 200 MG/1
TABLET ORAL
Qty: 28 TABLET | Refills: 0 | Status: SHIPPED | OUTPATIENT
Start: 2025-02-09 | End: 2025-03-02

## 2025-02-09 RX ORDER — HYDROCODONE BITARTRATE AND ACETAMINOPHEN 5; 325 MG/1; MG/1
1 TABLET ORAL EVERY 6 HOURS PRN
Qty: 30 TABLET | Refills: 0 | Status: SHIPPED | OUTPATIENT
Start: 2025-02-09 | End: 2025-02-17

## 2025-02-09 RX ORDER — METOPROLOL TARTRATE 50 MG
50 TABLET ORAL EVERY 12 HOURS SCHEDULED
Qty: 180 TABLET | Refills: 0 | Status: SHIPPED | OUTPATIENT
Start: 2025-02-09 | End: 2025-02-14 | Stop reason: SDUPTHER

## 2025-02-09 RX ORDER — POTASSIUM CHLORIDE 750 MG/1
40 TABLET, FILM COATED, EXTENDED RELEASE ORAL EVERY 4 HOURS
Status: COMPLETED | OUTPATIENT
Start: 2025-02-09 | End: 2025-02-09

## 2025-02-09 RX ORDER — FUROSEMIDE 40 MG/1
40 TABLET ORAL DAILY
Qty: 21 TABLET | Refills: 0 | Status: SHIPPED | OUTPATIENT
Start: 2025-02-10 | End: 2025-03-03

## 2025-02-09 RX ORDER — ACETAMINOPHEN 325 MG/1
650 TABLET ORAL EVERY 4 HOURS PRN
Start: 2025-02-09

## 2025-02-09 RX ORDER — APIXABAN 2.5 MG/1
2.5 TABLET, FILM COATED ORAL EVERY 12 HOURS SCHEDULED
Qty: 60 TABLET | Refills: 3 | Status: SHIPPED | OUTPATIENT
Start: 2025-02-09

## 2025-02-09 RX ORDER — POTASSIUM CHLORIDE 1500 MG/1
20 TABLET, EXTENDED RELEASE ORAL DAILY
Qty: 21 TABLET | Refills: 0 | Status: SHIPPED | OUTPATIENT
Start: 2025-02-10 | End: 2025-03-03

## 2025-02-09 RX ORDER — ASPIRIN 81 MG/1
81 TABLET ORAL DAILY
Qty: 90 TABLET | Refills: 0 | Status: SHIPPED | OUTPATIENT
Start: 2025-02-10 | End: 2025-05-11

## 2025-02-09 RX ORDER — ATORVASTATIN CALCIUM 40 MG/1
40 TABLET, FILM COATED ORAL NIGHTLY
Qty: 90 TABLET | Refills: 0 | Status: SHIPPED | OUTPATIENT
Start: 2025-02-09 | End: 2025-05-10

## 2025-02-09 RX ADMIN — ASPIRIN 81 MG: 81 TABLET, COATED ORAL at 08:37

## 2025-02-09 RX ADMIN — POTASSIUM CHLORIDE 40 MEQ: 750 TABLET, EXTENDED RELEASE ORAL at 05:17

## 2025-02-09 RX ADMIN — GUAIFENESIN 1200 MG: 600 TABLET, MULTILAYER, EXTENDED RELEASE ORAL at 08:36

## 2025-02-09 RX ADMIN — HYDROCODONE BITARTRATE AND ACETAMINOPHEN 2 TABLET: 5; 325 TABLET ORAL at 05:17

## 2025-02-09 RX ADMIN — METOPROLOL TARTRATE 50 MG: 50 TABLET, FILM COATED ORAL at 08:36

## 2025-02-09 RX ADMIN — FUROSEMIDE 40 MG: 40 TABLET ORAL at 08:36

## 2025-02-09 RX ADMIN — PANTOPRAZOLE SODIUM 40 MG: 40 TABLET, DELAYED RELEASE ORAL at 05:17

## 2025-02-09 RX ADMIN — MUPIROCIN 1 APPLICATION: 20 OINTMENT TOPICAL at 08:37

## 2025-02-09 RX ADMIN — AMIODARONE HYDROCHLORIDE 400 MG: 200 TABLET ORAL at 08:36

## 2025-02-09 RX ADMIN — POTASSIUM CHLORIDE 40 MEQ: 750 TABLET, EXTENDED RELEASE ORAL at 10:57

## 2025-02-09 RX ADMIN — GABAPENTIN 100 MG: 100 CAPSULE ORAL at 08:36

## 2025-02-09 RX ADMIN — DOCUSATE SODIUM 100 MG: 100 CAPSULE, LIQUID FILLED ORAL at 08:38

## 2025-02-09 NOTE — CASE MANAGEMENT/SOCIAL WORK
Continued Stay Note  [facility]     Patient Name: Roney Kuhn  MRN: [id number]  Today's Date: 2/9/2025    Admit Date: 2/5/2025    Plan: Home w/ [facility]   Discharge Plan       Row Name 02/09/25 1442       Plan    Plan Comments CCP notified that pt is discharging today and needs nocturnal oxygen set up. Spoke with pt whose ok with using [employer] for his DME. [employer] contacted and oxygen set up. Pt given instructions to call [employer] when he gets home and voiced understanding. RN updated.                   Discharge Codes    No documentation.                 Expected Discharge Date and Time       Expected Discharge Date Expected Discharge Time    Feb 9, 2025               Felicia Kate RN

## 2025-02-09 NOTE — PROGRESS NOTES
"    Patient Name: Roney Kuhn  :1950  75 y.o.      Patient Care Team:  Lizz Wiggins MD as PCP - General (Family Medicine)    Chief Complaint: AVR    Interval History: Feeling better this morning, up walked in the iraheta.       Objective   Vital Signs  Temp:  [98.2 °F (36.8 °C)-99 °F (37.2 °C)] 98.2 °F (36.8 °C)  Heart Rate:  [69-86] 77  Resp:  [16-18] 18  BP: (106-134)/(68-87) 114/76    Intake/Output Summary (Last 24 hours) at 2025 0955  Last data filed at 2025 1700  Gross per 24 hour   Intake 480 ml   Output 800 ml   Net -320 ml     Flowsheet Rows      Flowsheet Row First Filed Value   Admission Height 185.4 cm (73\") Documented at 2025   Admission Weight 89.4 kg (197 lb 3.2 oz) Documented at 2025            Physical Exam:   General Appearance:    Alert, cooperative, in no acute distress   Lungs:     Clear to auscultation.  Normal respiratory effort and rate.      Heart:    irregular rhythm and normal rate, normal S1 and S2, no murmurs, gallops or rubs.     Chest Wall:    No abnormalities observed   Abdomen:     Soft, nontender, positive bowel sounds.     Extremities:   no cyanosis, clubbing or edema.  No marked joint deformities.  Adequate musculoskeletal strength.       Results Review:    Results from last 7 days   Lab Units 25  0239   SODIUM mmol/L 134*   POTASSIUM mmol/L 3.6   CHLORIDE mmol/L 101   CO2 mmol/L 24.0   BUN mg/dL 21   CREATININE mg/dL 0.88   GLUCOSE mg/dL 118*   CALCIUM mg/dL 8.3*         Results from last 7 days   Lab Units 25  0239   WBC 10*3/mm3 7.62   HEMOGLOBIN g/dL 11.8*   HEMATOCRIT % 33.9*   PLATELETS 10*3/mm3 153     Results from last 7 days   Lab Units 25  0330 25  1500 25  1404 25  0742   INR  1.23* 1.44* 1.62* 0.92   APTT seconds  --  30.6 28.5 27.8     Results from last 7 days   Lab Units 25  0330   MAGNESIUM mg/dL 2.5*     Results from last 7 days   Lab Units 25  0742   CHOLESTEROL mg/dL " 176   TRIGLYCERIDES mg/dL 72   HDL CHOL mg/dL 87*   LDL CHOL mg/dL 76               Medication Review:   amiodarone, 400 mg, Oral, Q12H  aspirin, 81 mg, Oral, Daily  atorvastatin, 40 mg, Oral, Nightly  docusate sodium, 100 mg, Oral, BID  enoxaparin, 40 mg, Subcutaneous, Daily  furosemide, 40 mg, Oral, Daily  gabapentin, 100 mg, Oral, Q12H  guaiFENesin, 1,200 mg, Oral, Q12H  insulin lispro, 2-7 Units, Subcutaneous, 4x Daily AC & at Bedtime  metoprolol tartrate, 50 mg, Oral, Q12H  mupirocin, 1 Application, Each Nare, BID  pantoprazole, 40 mg, Oral, QAM  polyethylene glycol, 17 g, Oral, Daily  potassium chloride ER, 40 mEq, Oral, Q4H  potassium chloride, 20 mEq, Oral, Daily  senna-docusate sodium, 2 tablet, Oral, Nightly                Assessment & Plan   Active Hospital Problems    Diagnosis  POA    **Persistent atrial fibrillation [I48.19]  Unknown    Aortic stenosis [I35.0]  Yes      Resolved Hospital Problems   No resolved problems to display.     1.  Aortic valve insufficiency status post tissue AVR (29 mm magna ease pericardial prosthesis ) with left atrial appendage clip to 2/5/25  2.  Paroxysmal atrial fibrillation, has been on chronic anticoagulation as an outpatient.  was initially sinus after surgery but went into atrial fibrillation yesterday, started on IV amiodarone without a bolus and transition to oral, still in atrial fibrillation with rate control.  3.  Hypertension, doing well       Gurvinder Tapia III, MD  [facility]  02/09/25  09:55 EST

## 2025-02-09 NOTE — PLAN OF CARE
Goal Outcome Evaluation:  Problem: Adult Inpatient Plan of Care  Goal: Plan of Care Review  Outcome: Progressing  Flowsheets (Taken 2/9/2025 0446)  Progress: improving  Plan of Care Reviewed With: patient  Goal: Patient-Specific Goal (Individualized)  Outcome: Progressing  Goal: Absence of Hospital-Acquired Illness or Injury  Outcome: Progressing  Intervention: Identify and Manage Fall Risk  Description: Perform standard risk assessment on admission using a validated tool or comprehensive approach appropriate to the patient; reassess fall risk frequently, with change in status or transfer to another level of care.  Communicate risk to interprofessional healthcare team; ensure fall risk visible cue.  Determine need for increased observation, equipment and environmental modification, as well as use of supportive, nonskid footwear.  Adjust safety measures to individual needs and identified risk factors.  Reinforce the importance of active participation with fall risk prevention, safety, and physical activity with the patient and family.  Perform regular intentional rounding to assess need for position change, pain assessment and personal needs, including assistance with toileting.  Recent Flowsheet Documentation  Taken 2/9/2025 0443 by Reji Zapata, RN  Safety Promotion/Fall Prevention:   assistive device/personal items within reach   fall prevention program maintained   mobility aid in reach   lighting adjusted   nonskid shoes/slippers when out of bed   room organization consistent   safety round/check completed  Taken 2/9/2025 0240 by Reji Zapata, RN  Safety Promotion/Fall Prevention:   assistive device/personal items within reach   fall prevention program maintained   lighting adjusted   mobility aid in reach   nonskid shoes/slippers when out of bed   safety round/check completed   room organization consistent  Taken 2/9/2025 0000 by Reji Zapata, RN  Safety Promotion/Fall Prevention:   fall prevention program  maintained   clutter free environment maintained   assistive device/personal items within reach   mobility aid in reach   lighting adjusted   nonskid shoes/slippers when out of bed   safety round/check completed   room organization consistent  Taken 2/8/2025 2240 by Reji Zapata RN  Safety Promotion/Fall Prevention:   room organization consistent   safety round/check completed   nonskid shoes/slippers when out of bed   mobility aid in reach   lighting adjusted   fall prevention program maintained   assistive device/personal items within reach   clutter free environment maintained  Taken 2/8/2025 2030 by Reji Zapata RN  Safety Promotion/Fall Prevention:   room organization consistent   safety round/check completed   nonskid shoes/slippers when out of bed   lighting adjusted   mobility aid in reach   fall prevention program maintained   clutter free environment maintained   assistive device/personal items within reach  Intervention: Prevent Skin Injury  Description: Perform a screening for skin injury risk, such as pressure or moisture-associated skin damage on admission and at regular intervals throughout hospital stay.  Keep all areas of skin (especially folds) clean and dry.  Maintain adequate skin hydration.  Relieve and redistribute pressure and protect bony prominences and skin at risk for injury; implement measures based on patient-specific risk factors.  Match turning and repositioning schedule to clinical condition.  Encourage weight shift frequently; assist with reposition if unable to complete independently.  Float heels off bed; avoid pressure on the Achilles tendon.  Keep skin free from extended contact with medical devices.  Optimize nutrition and hydration.  Encourage functional activity and mobility, as early as tolerated.  Use aids (e.g., slide boards, mechanical lift) during transfer.  Recent Flowsheet Documentation  Taken 2/9/2025 0240 by Reji Zapata, RN  Body Position: position changed  independently  Taken 2/9/2025 0000 by Reji Zapata RN  Body Position: position changed independently  Taken 2/8/2025 2240 by Reji Zapata RN  Body Position: position changed independently  Taken 2/8/2025 2030 by Reji Zapata RN  Body Position: position changed independently  Intervention: Prevent Infection  Description: Maintain skin and mucous membrane integrity; promote hand, oral and pulmonary hygiene.  Optimize fluid balance, nutrition, sleep and glycemic control to maximize infection resistance.  Identify potential sources of infection early to prevent or mitigate progression of infection (e.g., wound, lines, devices).  Evaluate ongoing need for invasive devices; remove promptly when no longer indicated.  Review vaccination status.  Recent Flowsheet Documentation  Taken 2/9/2025 0443 by Reji Zapata RN  Infection Prevention:   single patient room provided   rest/sleep promoted   personal protective equipment utilized   hand hygiene promoted  Taken 2/9/2025 0240 by Reji Zapata RN  Infection Prevention:   single patient room provided   rest/sleep promoted   personal protective equipment utilized   hand hygiene promoted  Taken 2/9/2025 0000 by Reji Zapata RN  Infection Prevention:   single patient room provided   rest/sleep promoted   personal protective equipment utilized   hand hygiene promoted  Taken 2/8/2025 2240 by Reji Zapata RN  Infection Prevention:   single patient room provided   rest/sleep promoted   hand hygiene promoted   personal protective equipment utilized  Taken 2/8/2025 2030 by Reji Zapata RN  Infection Prevention:   single patient room provided   rest/sleep promoted   personal protective equipment utilized   hand hygiene promoted  Goal: Optimal Comfort and Wellbeing  Outcome: Progressing  Intervention: Monitor Pain and Promote Comfort  Description: Assess pain level, treatment efficacy and patient response at regular intervals using a consistent pain scale.  Consider the presence  and impact of preexisting chronic pain.  Encourage patient and caregiver involvement in pain assessment, interventions and safety measures.  Promote activity; balance with sleep and rest to enhance healing.  Recent Flowsheet Documentation  Taken 2/8/2025 2030 by Reji Zapata RN  Pain Management Interventions:   pain medication given   position adjusted   pillow support provided   care clustered   quiet environment facilitated  Intervention: Provide Person-Centered Care  Description: Use a family-focused approach to care; encourage support system presence and participation.  Develop trust and rapport by proactively providing information, encouraging questions, addressing concerns and offering reassurance.  Acknowledge emotional response to hospitalization.  Recognize and utilize personal coping strategies and strengths; develop goals via shared decision-making.  Honor spiritual and cultural preferences.  Recent Flowsheet Documentation  Taken 2/9/2025 0000 by Reji Zapata RN  Trust Relationship/Rapport:   thoughts/feelings acknowledged   reassurance provided   questions encouraged   questions answered  Taken 2/8/2025 2030 by Reji Zapata RN  Trust Relationship/Rapport:   thoughts/feelings acknowledged   questions encouraged   questions answered   reassurance provided  Goal: Readiness for Transition of Care  Outcome: Progressing     Problem: Skin Injury Risk Increased  Goal: Skin Health and Integrity  Outcome: Progressing  Intervention: Optimize Skin Protection  Description: Perform a full pressure injury risk assessment, as indicated by screening, upon admission to care unit.  Reassess skin (full inspection and injury risk, including skin temperature, consistency and color) frequently (e.g., scheduled interval, with change in condition) to provide optimal early detection and prevention.  Maintain adequate tissue perfusion (e.g., encourage fluid balance; avoid crossing legs, constrictive clothing or devices) to  promote tissue oxygenation.  Maintain head of bed at lowest degree of elevation tolerated, considering medical condition and other restrictions. Use positioning supports to prevent sliding and friction. Consider low friction textiles.  Avoid positioning onto an area that remains reddened or on bony prominences.  Minimize incontinence and moisture (e.g., toileting schedule; moisture-wicking pad, diaper or incontinence collection device; skin moisture barrier).  Cleanse skin promptly and gently, when soiled, utilizing a pH-balanced cleanser.  Relieve and redistribute pressure (e.g., scheduled position changes, weight shifts, use of support surface, medical device repositioning, protective dressing application, use of positioning device, microclimate control, use of pressure-injury-monitor  Encourage increased activity, such as sitting in a chair at the bedside or early mobilization, when able to tolerate. Avoid prolonged sitting.  Recent Flowsheet Documentation  Taken 2/9/2025 0443 by Reji Zapata RN  Activity Management: activity encouraged  Taken 2/9/2025 0240 by Reji Zapata RN  Activity Management: activity encouraged  Head of Bed (HOB) Positioning: HOB elevated  Taken 2/9/2025 0000 by Reji Zapata RN  Activity Management: activity encouraged  Head of Bed (HOB) Positioning: HOB elevated  Taken 2/8/2025 2240 by Reji Zapata RN  Activity Management: activity encouraged  Head of Bed (HOB) Positioning: HOB elevated  Taken 2/8/2025 2030 by Reji Zapata RN  Activity Management: activity encouraged  Head of Bed (HOB) Positioning: HOB elevated     Problem: Fall Injury Risk  Goal: Absence of Fall and Fall-Related Injury  Outcome: Progressing  Intervention: Identify and Manage Contributors  Description: Develop a fall prevention plan, considering patient-centered interventions and family/caregiver involvement; identify and address patient's facilitators and barriers.  Provide reorientation, appropriate sensory  stimulation and routines with changes in mental status to decrease risk of fall.  Promote use of personal vision and auditory aids.  Assess assistance level required for safe and effective self-care; provide support as needed, such as toileting and mobilization. For age 65 and older, implement timed toileting with assistance.  Encourage physical activity, such as performance of mobility and self-care at highest level of patient ability, multicomponent exercise program and provision of appropriate assistive devices.  If fall occurs, assess the severity of injury; implement fall injury protocol. Determine the cause and revise fall injury prevention plan.  Regularly review and advocate for medication adjustment to decrease fall risk; consider administration times, polypharmacy and age.  Balance adequate pain management with potential for oversedation.  Recent Flowsheet Documentation  Taken 2/9/2025 0443 by Reji Zapata RN  Medication Review/Management: medications reviewed  Taken 2/9/2025 0240 by Reji Zapata RN  Medication Review/Management: medications reviewed  Taken 2/9/2025 0000 by Reji Zapata RN  Medication Review/Management: medications reviewed  Taken 2/8/2025 2240 by Reji Zapata RN  Medication Review/Management: medications reviewed  Taken 2/8/2025 2030 by Reji Zapata RN  Medication Review/Management: medications reviewed  Intervention: Promote Injury-Free Environment  Description: Provide a safe, barrier-free environment that encourages independent activity.  Keep care area uncluttered and well-lighted.  Determine need for increased observation or monitoring.  Avoid use of devices that minimize mobility, such as restraints or indwelling urinary catheter.  Recent Flowsheet Documentation  Taken 2/9/2025 0443 by Reji Zapata RN  Safety Promotion/Fall Prevention:   assistive device/personal items within reach   fall prevention program maintained   mobility aid in reach   lighting adjusted   nonskid  shoes/slippers when out of bed   room organization consistent   safety round/check completed  Taken 2/9/2025 0240 by Reji Zapata RN  Safety Promotion/Fall Prevention:   assistive device/personal items within reach   fall prevention program maintained   lighting adjusted   mobility aid in reach   nonskid shoes/slippers when out of bed   safety round/check completed   room organization consistent  Taken 2/9/2025 0000 by Reji Zapata RN  Safety Promotion/Fall Prevention:   fall prevention program maintained   clutter free environment maintained   assistive device/personal items within reach   mobility aid in reach   lighting adjusted   nonskid shoes/slippers when out of bed   safety round/check completed   room organization consistent  Taken 2/8/2025 2240 by Reji Zapata RN  Safety Promotion/Fall Prevention:   room organization consistent   safety round/check completed   nonskid shoes/slippers when out of bed   mobility aid in reach   lighting adjusted   fall prevention program maintained   assistive device/personal items within reach   clutter free environment maintained  Taken 2/8/2025 2030 by Reji Zapata RN  Safety Promotion/Fall Prevention:   room organization consistent   safety round/check completed   nonskid shoes/slippers when out of bed   lighting adjusted   mobility aid in reach   fall prevention program maintained   clutter free environment maintained   assistive device/personal items within reach     Problem: Comorbidity Management  Goal: Blood Glucose Level Within Target Range  Outcome: Progressing  Intervention: Monitor and Manage Glycemia  Description: Establish target blood glucose levels based on patient-specific factors, such as age, diabetes-related complications and illness severity.  Document blood glucose levels and monitor trend.  Provide antihyperglycemic pharmacologic therapy to maintain blood glucose levels within targeted range.  Advocate for patient use of home devices such as insulin  pump, continuous glucose monitor; assess for safe and competent participation in care.  Check blood glucose level if there is a change in mental or cognitive status.  Recognize, treat and document hypoglycemia event and potential cause.  Assess current lifestyle patterns; acknowledging positive patterns supporting wellbeing.  Evaluate effectiveness of coping skills; encourage expression of feelings, expectations and concerns related to disease management and quality of life; reinforce education to enhance management plan and wellbeing.  Recent Flowsheet Documentation  Taken 2/9/2025 0443 by Reji Zapata RN  Medication Review/Management: medications reviewed  Taken 2/9/2025 0240 by Reji Zapata RN  Medication Review/Management: medications reviewed  Taken 2/9/2025 0000 by Reji Zapata RN  Medication Review/Management: medications reviewed  Taken 2/8/2025 2240 by Reji Zapata RN  Medication Review/Management: medications reviewed  Taken 2/8/2025 2030 by Reji Zapata RN  Medication Review/Management: medications reviewed  Goal: Blood Pressure in Desired Range  Outcome: Progressing  Intervention: Maintain Blood Pressure Management  Description: Evaluate adherence to home antihypertensive regimen (e.g., exercise and activity, diet modification, medication).  Provide scheduled antihypertensive medication; consider administration time and effects (e.g., avoid giving diuretic prior to bedtime).  Monitor response to antihypertensive medication therapy (e.g., blood pressure, electrolyte levels, medication effects).  Minimize risk of orthostatic hypotension; encourage caution with position changes, particularly if elderly.  Recent Flowsheet Documentation  Taken 2/9/2025 0443 by Reji Zapata RN  Medication Review/Management: medications reviewed  Taken 2/9/2025 0240 by Reji Zapata RN  Medication Review/Management: medications reviewed  Taken 2/9/2025 0000 by Reji Zapata RN  Medication Review/Management: medications  reviewed  Taken 2/8/2025 2240 by Reji Zapata, RN  Medication Review/Management: medications reviewed  Taken 2/8/2025 2030 by Reji Zapata, RN  Medication Review/Management: medications reviewed     Plan of Care Reviewed With: patient        Progress: improving

## 2025-02-09 NOTE — PROGRESS NOTES
Exercise Oximetry    Patient Name:oRney Kuhn   MRN: [id number]   Date: 02/09/25             ROOM AIR BASELINE   SpO2% 93   Heart Rate 76   Blood Pressure      EXERCISE ON ROOM AIR SpO2% EXERCISE ON O2 @  LPM SpO2%   1 MINUTE 93 1 MINUTE    2 MINUTES 92 2 MINUTES    3 MINUTES 94 3 MINUTES    4 MINUTES 96 4 MINUTES    5 MINUTES 92 5 MINUTES    6 MINUTES 90 6 MINUTES               Distance Walked   Distance Walked   Dyspnea (Alexis Scale)   Dyspnea (Alexis Scale)   Fatigue (Alexis Scale)   Fatigue (Alexis Scale)   SpO2% Post Exercise  93 SpO2% Post Exercise   HR Post Exercise  76 HR Post Exercise   Time to Recovery   Time to Recovery     Comments: PT walked 2 laps around RN station. No C/O pain or SOB. PT not requiring supplemental O2 at this time.

## 2025-02-09 NOTE — DISCHARGE SUMMARY
Date of Admission: 2/5/2025  Date of Discharge:  2/9/2025    Discharge Diagnosis:   -Aortic valve insuffiencey- AVR (tissue), left atrial appendage clip- Juan 2/5/2025  -Atrial fibrillation on eliquis - last dose 1/29  -Hypertension  -Hyperlipidemia  -Post op anemia- expected acute blood loss     Presenting Problem/History of Present Illness  Persistent atrial fibrillation [I48.19]  Aortic stenosis [I35.0]     Hospital Course  Patient is a 75 y.o. male who was admitted on 2/5 and on that same underwent Elective AVR with a 29 mm magna ease pericardial prosthesis, Left atrial appendage epicardial closure with a 45 mm atrial clip device with Dr. Martinez (see op-note for more detail). Operation went well and after, patient was transferred to CVR in stable condition where he was later extubated. POD1, patient in SR with rate 50-60's under pacer and on IV amiodarone. Patient was on cardene that morning, beta blocker was added and patient weaned off james. Ruffin and arterial line removed and patient was transferred to stepdown unit. POD2, beta blocker increased for hypertension. [place] and central line removed. POD3, chest tubes removed without issue. Patient in rate controlled a.fib. AV wires were removed. Overnight oximetry completed and patient had 57 minutes of desaturation. Patient will need nocturnal oxygen at discharge. POD4, walking oximetry performed and patient passed. Patient deemed ready for discharge home with . Patient educated on sternal precautions and signs of infection. Our office to call patient with post-op appointment. I discussed eliquis with Dr. Martinez, he would like patient discharge with low dose rather than full dose.    Procedures Performed  Procedure(s):  AORTIC VALVE REPLACEMENT; LEFT ATRIAL APPENDAGE EXCLUSION WITH ATRICLIP; PRP WITH TRANSESOPHAGEAL ECHOCARDIOGRAM       Consults:   Consults       Date and Time Order Name Status Description    2/5/2025  2:59 PM Inpatient Cardiology Consult  Completed     2/5/2025  2:59 PM Inpatient Intensivist Consult              Pertinent Test Results:    Lab Results   Component Value Date    WBC 7.62 02/09/2025    HGB 11.8 (L) 02/09/2025    HCT 33.9 (L) 02/09/2025    MCV 92.1 02/09/2025     02/09/2025      Lab Results   Component Value Date    GLUCOSE 118 (H) 02/09/2025    CALCIUM 8.3 (L) 02/09/2025     (L) 02/09/2025    K 3.6 02/09/2025    CO2 24.0 02/09/2025     02/09/2025    BUN 21 02/09/2025    CREATININE 0.88 02/09/2025    EGFRIFAFRI 99 01/04/2022    EGFRIFNONA 86 01/04/2022    BCR 23.9 02/09/2025    ANIONGAP 9.0 02/09/2025     Lab Results   Component Value Date    INR 1.23 (H) 02/06/2025    PROTIME 15.4 (H) 02/06/2025         Condition on Discharge: Stable     Vital Signs  Temp:  [98 °F (36.7 °C)-99 °F (37.2 °C)] 98 °F (36.7 °C)  Heart Rate:  [70-86] 77  Resp:  [16-18] 16  BP: (106-134)/(76-87) 134/86      Discharge Disposition  Home or Self Care    Discharge Medications     Discharge Medications        New Medications        Instructions Start Date   acetaminophen 325 MG tablet  Commonly known as: TYLENOL   650 mg, Oral, Every 4 Hours PRN      amiodarone 400 MG tablet  Commonly known as: PACERONE   Take 0.5 tablets by mouth Every 12 (Twelve) Hours for 7 days, THEN 0.5 tablets Daily for 14 days.   Start Date: February 9, 2025     aspirin 81 MG EC tablet   81 mg, Oral, Daily   Start Date: February 10, 2025     atorvastatin 40 MG tablet  Commonly known as: LIPITOR   40 mg, Oral, Nightly      furosemide 40 MG tablet  Commonly known as: LASIX   40 mg, Oral, Daily   Start Date: February 10, 2025     HYDROcodone-acetaminophen 5-325 MG per tablet  Commonly known as: NORCO   1 tablet, Oral, Every 6 Hours PRN      potassium chloride 20 MEQ CR tablet  Commonly known as: KLOR-CON M20   20 mEq, Oral, Daily   Start Date: February 10, 2025            Changes to Medications        Instructions Start Date   Eliquis 5 MG tablet tablet  Generic drug:  apixaban  What changed: how much to take   2.5 mg, Oral, Every 12 Hours Scheduled      metoprolol tartrate 50 MG tablet  Commonly known as: LOPRESSOR  What changed:   medication strength  how much to take  when to take this   50 mg, Oral, Every 12 Hours Scheduled             Continue These Medications        Instructions Start Date   traZODone 50 MG tablet  Commonly known as: DESYREL   TAKE ONE TABLET BY MOUTH ONCE NIGHTLY             Stop These Medications      benzonatate 200 MG capsule  Commonly known as: TESSALON     lisinopril-hydrochlorothiazide 20-12.5 MG per tablet  Commonly known as: PRINZIDE,ZESTORETIC     rosuvastatin 5 MG tablet  Commonly known as: CRESTOR              Discharge Diet: Heart healthy     Activity at Discharge:   1. No driving for 2 weeks and off narcotic pain medications.  2. Shower daily. Clean incisions with warm water and antibacterial soap only. Do not put any lotion or ointments on incisions.  3. Ambulate for 10 minutes at least 3 times a day.  4. No heavy lifting > 10lbs until seen in office.   5. Take all medications as prescribed.      Follow-up Appointments  Future Appointments   Date Time Provider Department Center   2/14/2025 10:30 AM Liss Yanez APRN [facility]     Additional Instructions for the Follow-ups that You Need to Schedule       Ambulatory Referral to Cardiac Rehab   As directed      Call MD With Problems / Concerns   As directed      Instructions:  Call office at [phone] for any drainage, increased redness, or fever over 100.5    Order Comments: Instructions:  Call office at [phone] for any drainage, increased redness, or fever over 100.5         Discharge Follow-up with PCP   As directed       Currently Documented PCP:    Lizz Wiggins MD    PCP Phone Number:    [phone]     Follow Up Details: in 1 week        Discharge Follow-up with Specialty: Cardiologist KARI/PA; 1 Week   As directed      Specialty: Cardiologist KARI/PA   Follow Up: 1  Week   Follow Up Details: bring all prescription bottles to appointment, call for appointment        Discharge Follow-up with Specified Provider: Cardiac surgery DINORAH   As directed      To: Cardiac surgery DINORAH   Follow Up Details: our office will call patient with appt        Discharge Follow-up with Specified Provider: Cardiologist; 1 Month   As directed      To: Cardiologist   Follow Up: 1 Month   Follow Up Details: call for appointment, bring all medication bottles to appointment                Test Results Pending at Discharge       Samir Hargrove PA-C  02/09/25  12:56 EST

## 2025-02-09 NOTE — PROGRESS NOTES
" LOS: 4 days   Patient Care Team:  Lizz Wiggins MD as PCP - General (Family Medicine)    Chief Complaint: post-op AVR/ROBLES ligation     Subjective    Patient looks great this morning, has been walking around the unit     Vital Signs  Temp:  [98.2 °F (36.8 °C)-99 °F (37.2 °C)] 98.2 °F (36.8 °C)  Heart Rate:  [69-86] 71  Resp:  [16-18] 18  BP: (106-134)/(68-87) 126/84  Body mass index is 26.15 kg/m².    Intake/Output Summary (Last 24 hours) at 2/9/2025 0803  Last data filed at 2/8/2025 1700  Gross per 24 hour   Intake 480 ml   Output 800 ml   Net -320 ml     No intake/output data recorded.        02/07/25  0634 02/08/25  0624 02/09/25  0512   Weight: 89.7 kg (197 lb 11.2 oz) 89.8 kg (198 lb) 89.9 kg (198 lb 3.2 oz)         Objective:  General Appearance:  Comfortable and in no acute distress.    Vital signs: (most recent): Blood pressure 114/76, pulse 77, temperature 98.2 °F (36.8 °C), temperature source Oral, resp. rate 18, height 185.4 cm (73\"), weight 89.9 kg (198 lb 3.2 oz), SpO2 94%.  Vital signs are normal.  No fever.    Output: Producing urine and producing stool.    Lungs:  Normal effort and normal respiratory rate.  There are decreased breath sounds.    Heart: Normal rate.  Irregular rhythm.    Abdomen: Abdomen is soft.  Bowel sounds are normal.     Extremities: Normal range of motion.  There is no dependent edema.    Neurological: Patient is alert and oriented to person, place and time.    Skin:  Warm and dry.  (Sternal incision without erythema, edema, or drainage )                   Results Review:        WBC WBC   Date Value Ref Range Status   02/09/2025 7.62 3.40 - 10.80 10*3/mm3 Final   02/08/2025 9.92 3.40 - 10.80 10*3/mm3 Final   02/07/2025 13.09 (H) 3.40 - 10.80 10*3/mm3 Final      HGB Hemoglobin   Date Value Ref Range Status   02/09/2025 11.8 (L) 13.0 - 17.7 g/dL Final   02/08/2025 13.5 13.0 - 17.7 g/dL Final   02/07/2025 12.9 (L) 13.0 - 17.7 g/dL Final      HCT Hematocrit   Date Value Ref " "Range Status   02/09/2025 33.9 (L) 37.5 - 51.0 % Final   02/08/2025 37.9 37.5 - 51.0 % Final   02/07/2025 37.4 (L) 37.5 - 51.0 % Final      Platelets Platelets   Date Value Ref Range Status   02/09/2025 153 140 - 450 10*3/mm3 Final   02/08/2025 153 140 - 450 10*3/mm3 Final   02/07/2025 146 140 - 450 10*3/mm3 Final        PT/INR:    No results found for: \"PROTIME\"  /  No results found for: \"INR\"      Sodium Sodium   Date Value Ref Range Status   02/09/2025 134 (L) 136 - 145 mmol/L Final   02/08/2025 133 (L) 136 - 145 mmol/L Final   02/07/2025 137 136 - 145 mmol/L Final      Potassium Potassium   Date Value Ref Range Status   02/09/2025 3.6 3.5 - 5.2 mmol/L Final   02/08/2025 4.1 3.5 - 5.2 mmol/L Final     Comment:     Slight hemolysis detected by analyzer. Result may be falsely elevated.   02/08/2025 3.8 3.5 - 5.2 mmol/L Final   02/07/2025 4.0 3.5 - 5.2 mmol/L Final      Chloride Chloride   Date Value Ref Range Status   02/09/2025 101 98 - 107 mmol/L Final   02/08/2025 100 98 - 107 mmol/L Final   02/07/2025 103 98 - 107 mmol/L Final      Bicarbonate CO2   Date Value Ref Range Status   02/09/2025 24.0 22.0 - 29.0 mmol/L Final   02/08/2025 24.9 22.0 - 29.0 mmol/L Final   02/07/2025 23.9 22.0 - 29.0 mmol/L Final      BUN BUN   Date Value Ref Range Status   02/09/2025 21 8 - 23 mg/dL Final   02/08/2025 20 8 - 23 mg/dL Final   02/07/2025 25 (H) 8 - 23 mg/dL Final      Creatinine Creatinine   Date Value Ref Range Status   02/09/2025 0.88 0.76 - 1.27 mg/dL Final   02/08/2025 0.92 0.76 - 1.27 mg/dL Final   02/07/2025 0.96 0.76 - 1.27 mg/dL Final      Calcium Calcium   Date Value Ref Range Status   02/09/2025 8.3 (L) 8.6 - 10.5 mg/dL Final   02/08/2025 8.6 8.6 - 10.5 mg/dL Final   02/07/2025 8.5 (L) 8.6 - 10.5 mg/dL Final      Magnesium No results found for: \"MG\"         amiodarone, 400 mg, Oral, Q12H  aspirin, 81 mg, Oral, Daily  atorvastatin, 40 mg, Oral, Nightly  docusate sodium, 100 mg, Oral, BID  enoxaparin, 40 mg, " Subcutaneous, Daily  furosemide, 40 mg, Oral, Daily  gabapentin, 100 mg, Oral, Q12H  guaiFENesin, 1,200 mg, Oral, Q12H  insulin lispro, 2-7 Units, Subcutaneous, 4x Daily AC & at Bedtime  metoprolol tartrate, 50 mg, Oral, Q12H  mupirocin, 1 Application, Each Nare, BID  pantoprazole, 40 mg, Oral, QAM  polyethylene glycol, 17 g, Oral, Daily  potassium chloride ER, 40 mEq, Oral, Q4H  potassium chloride, 20 mEq, Oral, Daily  senna-docusate sodium, 2 tablet, Oral, Nightly                   Persistent atrial fibrillation    Aortic stenosis      Assessment & Plan    -Aortic valve insuffiencey- AVR (tissue), left atrial appendage clip- Juan 2/5/2025  -Atrial fibrillation on eliquis - last dose 1/29  -Hypertension  -Hyperlipidemia  -Post op anemia- expected acute blood loss     POD4:     Patient remains in rate controlled a.fib, on Eliquis at home, will discuss with Dr. Martinez   BP remains stable, continue oral amiodarone and beta blocker  Patient on RA currently, overnight oximetry completed last night and patient will need nocturnal oxygen at discharge, will order walking oximetry   CXR this morning completed, he looks congested, continue oral diuretic  Encourage IS/flutter valve, continue routine care   Likely home with HH this afternoon    Samir Hargrove PA-C  02/09/25  08:03 EST

## 2025-02-09 NOTE — DISCHARGE PLACEMENT REQUEST
"HattieShanna lantigua \"MARIANELA\" (75 y.o. Male)       Date of Birth   1950    Social Security Number       Address   [address] [place] [address]    Home Phone   [phone]    MRN   [id number]       [employer]    Marital Status                               Admission Date   2/5/25    Admission Type   Elective    Admitting Provider   Prasanth Martinez MD    Attending Provider   Prasanth Martinez MD    Department, Room/Bed   [facility] CARDIOVASC UNIT, 2204/1       Discharge Date       Discharge Disposition   Home or Self Care    Discharge Destination                                 Attending Provider: Prasanth Martinez MD    Allergies: No Known Allergies    Isolation: None   Infection: None   Code Status: CPR    Ht: 185.4 cm (73\")   Wt: 89.9 kg (198 lb 3.2 oz)    Admission Cmt: None   Principal Problem: Persistent atrial fibrillation [I48.19]                   Active Insurance as of 2/5/2025       Primary Coverage       Payor Plan Insurance Group Employer/Plan Group    HUMANA MEDICARE REPLACEMENT HUMANA MED ADV HMO [id number]       Payor Plan Address Payor Plan Phone Number Payor Plan Fax Number Effective Dates    [address] [address] [phone]  1/1/2023 - None Entered    [facility] [address]         Subscriber Name Subscriber Birth Date Member ID       SHANNA FRANCO 1950 [id number]                     Emergency Contacts        (Rel.) Home Phone Work Phone Mobile Phone    HattieCristine (Spouse) [phone] -- [phone]    MARIANELA OTERO (Son) -- -- [phone]    PHYLICIA OTERO (Son) -- -- [phone]                "

## 2025-02-10 ENCOUNTER — READMISSION MANAGEMENT (OUTPATIENT)
Dept: CALL CENTER | Facility: HOSPITAL | Age: 75
End: 2025-02-10
Payer: MEDICARE

## 2025-02-10 ENCOUNTER — TELEPHONE (OUTPATIENT)
Dept: CARDIAC SURGERY | Facility: CLINIC | Age: 75
End: 2025-02-10
Payer: MEDICARE

## 2025-02-10 ENCOUNTER — LAB (OUTPATIENT)
Dept: LAB | Facility: HOSPITAL | Age: 75
End: 2025-02-10
Payer: MEDICARE

## 2025-02-10 DIAGNOSIS — Z95.2 S/P AVR: ICD-10-CM

## 2025-02-10 DIAGNOSIS — Z95.2 S/P AVR: Primary | ICD-10-CM

## 2025-02-10 LAB
BH BB BLOOD EXPIRATION DATE: NORMAL
BH BB BLOOD EXPIRATION DATE: NORMAL
BH BB BLOOD TYPE BARCODE: 6200
BH BB BLOOD TYPE BARCODE: 6200
BH BB DISPENSE STATUS: NORMAL
BH BB DISPENSE STATUS: NORMAL
BH BB PRODUCT CODE: NORMAL
BH BB PRODUCT CODE: NORMAL
BH BB UNIT NUMBER: NORMAL
BH BB UNIT NUMBER: NORMAL
BILIRUB UR QL STRIP: NEGATIVE
CLARITY UR: CLEAR
COLOR UR: YELLOW
CROSSMATCH INTERPRETATION: NORMAL
CROSSMATCH INTERPRETATION: NORMAL
GLUCOSE UR STRIP-MCNC: NEGATIVE MG/DL
HGB UR QL STRIP.AUTO: NEGATIVE
KETONES UR QL STRIP: NEGATIVE
LEUKOCYTE ESTERASE UR QL STRIP.AUTO: NEGATIVE
NITRITE UR QL STRIP: NEGATIVE
PH UR STRIP.AUTO: 6 [PH] (ref 5–8)
PROT UR QL STRIP: ABNORMAL
SP GR UR STRIP: 1.01 (ref 1–1.03)
UNIT  ABO: NORMAL
UNIT  ABO: NORMAL
UNIT  RH: NORMAL
UNIT  RH: NORMAL
UROBILINOGEN UR QL STRIP: ABNORMAL

## 2025-02-10 PROCEDURE — 81003 URINALYSIS AUTO W/O SCOPE: CPT

## 2025-02-10 RX ORDER — TAMSULOSIN HYDROCHLORIDE 0.4 MG/1
1 CAPSULE ORAL DAILY
Qty: 30 CAPSULE | Refills: 0 | Status: SHIPPED | OUTPATIENT
Start: 2025-02-10

## 2025-02-10 NOTE — TELEPHONE ENCOUNTER
----- Message from Rochelle MINDI sent at 2/10/2025  8:44 AM EST -----  Regarding: pt would like c/b  Contact: [phone]  Pt stated he went to bed around 11 last night and woke up with pain and feeling urgency to have to urinate. He thought the pain was caused by soap getting in his penis when he showered but it continued and accompanied the frequency and urgency to urinate. Pt was d/c yesterday afternoon. Dr. Martinez pt, 02/05/2025 AVR.

## 2025-02-10 NOTE — TELEPHONE ENCOUNTER
UA reviewed with Apurva PIERCE. Flomax rx sent to pharmacy. If patient continues to have problems needs to be seen with PCP or Urologist.    Called and spoke with , discussed ua results. Advised if he continues to have problems to make an appt with either is PCP or urologist. Hattie verbalized understanding.

## 2025-02-10 NOTE — CASE MANAGEMENT/SOCIAL WORK
Case Management Discharge Note      Final Note: Pt discharged home with [facility] and [employer] provided oxygen.  CCP notified Norma/[facility] that Pt d/c over the weekend.....Verito S/RN CM    Provided Post Acute Provider Quality & Resource List?: Refused    Selected Continued Care - Discharged on 2/9/2025 Admission date: 2/5/2025 - Discharge disposition: Home or Self Care      Destination    No services have been selected for the patient.                Durable Medical Equipment Coordination complete.      Service Provider Services Address Phone Fax Patient Preferred    [employer] [facility] [address] [facility] [address] [phone] --              Dialysis/Infusion    No services have been selected for the patient.                Home Medical Care Coordination complete.      Service Provider Services Address Phone Fax Patient Preferred    [facility] - [facility] [address] [facility] [address] [id number] [phone] --              Therapy    No services have been selected for the patient.                Community Resources    No services have been selected for the patient.                Community & DME    No services have been selected for the patient.                         Final Discharge Disposition Code: 06 - home with home health care

## 2025-02-10 NOTE — TELEPHONE ENCOUNTER
Discussed with Apurva PIERCE, Patient advised to go to [facility] for UA.    Spoke with Hattie, he states he has constant burning that has not improved. Has taken pain medication but has not helped. He states he was up multiple times during the night with urge to urinate. After a few minutes he was able to get a little urine out.

## 2025-02-10 NOTE — OUTREACH NOTE
Prep Survey      Flowsheet Row Responses   [facility] facility patient discharged from? [facility]   Is LACE score < 7 ? No   Eligibility Readm Mgmt   Discharge diagnosis Persistent atrial fibrillation   Does the patient have one of the following disease processes/diagnoses(primary or secondary)? Other   Does the patient have Home health ordered? Yes   What is the Home health agency?  [facility]   Is there a DME ordered? Yes   What DME was ordered? [facility] [employer]--pending at d/c   Medication alerts for this patient see avs   Prep survey completed? Yes            Puja BAILEY - Registered Nurse

## 2025-02-12 ENCOUNTER — READMISSION MANAGEMENT (OUTPATIENT)
Dept: CALL CENTER | Facility: HOSPITAL | Age: 75
End: 2025-02-12
Payer: MEDICARE

## 2025-02-12 NOTE — OUTREACH NOTE
Medical Week 1 Survey      Flowsheet Row Responses   [facility] [place] patient discharged from? [facility]   Does the patient have one of the following disease processes/diagnoses(primary or secondary)? Other   Week 1 attempt successful? Yes   Call start time 1142   Call end time 1145   Discharge diagnosis Persistent atrial fibrillation   Meds reviewed with patient/caregiver? Yes   Is the patient having any side effects they believe may be caused by any medication additions or changes? No   Does the patient have all medications ordered at discharge? Yes   Is the patient taking all medications as directed (includes completed medication regime)? Yes   Does the patient have a primary care provider?  Yes   Does the patient have an appointment with their PCP within 7 days of discharge? Yes   Has the patient kept scheduled appointments due by today? N/A   [facility]   Has home health visited the patient within 72 hours of discharge? Yes   Psychosocial issues? No   Did the patient receive a copy of their discharge instructions? Yes   Nursing interventions Reviewed instructions with patient   What is the patient's perception of their health status since discharge? Improving   Week 1 call completed? Yes   Wrap up additional comments Pt doing well. Has f/u this week.   Call end time 1145            Lara CARTER - Registered Nurse

## 2025-02-14 ENCOUNTER — OFFICE VISIT (OUTPATIENT)
Dept: CARDIOLOGY | Facility: CLINIC | Age: 75
End: 2025-02-14
Payer: MEDICARE

## 2025-02-14 VITALS
SYSTOLIC BLOOD PRESSURE: 150 MMHG | HEIGHT: 73 IN | DIASTOLIC BLOOD PRESSURE: 82 MMHG | HEART RATE: 92 BPM | OXYGEN SATURATION: 100 % | WEIGHT: 197 LBS | BODY MASS INDEX: 26.11 KG/M2

## 2025-02-14 DIAGNOSIS — Z95.2 S/P AVR (AORTIC VALVE REPLACEMENT): Primary | ICD-10-CM

## 2025-02-14 RX ORDER — METOPROLOL TARTRATE 50 MG
25 TABLET ORAL EVERY 12 HOURS SCHEDULED
Start: 2025-02-14 | End: 2025-05-15

## 2025-02-14 NOTE — PROGRESS NOTES
Date of Office Visit: 25  Encounter Provider: KARI Lockhart  Place of Service: [facility] CARDIOLOGY  Patient Name: Roney Kuhn  :1950    Chief Complaint   Patient presents with    Persistent atrial fibrillation    Aortic Stenosis    Follow-up   :     HPI: Roney Kuhn is a 75 y.o. male  with atrial fibrillation, severe aortic valve insufficiency status post AVR, hypertension, hyperlipidemia, cardiomyopathy, aortic root dilation, right lung nodule, and BPH.         He is followed by Dr. Tammy Mi.  I will visit with him in follow-up and have reviewed his medical record.     In April he was on vacation in [facility] and had an aggressive cough.  He went to urgent care and ultimately was admitted to the nearest hospital.  He was treated for pneumonia and told he had A-fib.  He had an echocardiogram which showed ejection fraction of 45-50% and dilated left ventricle, aortic root measuring 4.3 cm, mild mitral valve regurgitation, mild to moderate tricuspid valve regurgitation, mildly dilated left atrium, mildly elevated pulmonary pressure 35 mmHg,  In May 2024 patient had successful external cardioversion.  He then maintained normal sinus rhythm.  He had transesophageal echocardiogram 2024 which showed dilated LV cavity with an EF of 56-60% and severe trileaflet aortic valve regurgitation with poor coaptation between the right and left coronary cusp.  He then was sent to Dr. Martinez and surgical intervention was recommended.  He had cardiac catheterization 2024 which showed 10-20% coronary artery disease.  He had preoperative PFT 2024.     2025-he ultimately had elective AVR with a 29 mm magna ease pericardial prosthesis,   Left atrial appendage epicardial closure with a 45 mm atrial clip device on 25 by Dr. Martinez.  He had postop A-fib and was placed on amiodarone.    He presents today for reassessment.  He is feeling well  "overall.  He is having some urinary issues and took AZO over-the-counter for 2 days.  He has an appointment with urology.  He continues to take Flomax.  No chest pain or shortness of breath or edema or palpitations.  He was in his pain medicine for urinary discomfort.  He is dizzy when he first gets out of bed but his home blood pressure readings are doing good.  He would like to participate in cardiac rehab.        No Known Allergies        Family and social history reviewed.     ROS  All other systems were reviewed and are negative          Objective:     Vitals:    02/14/25 1054 02/14/25 1141   BP: 150/82    BP Location: Left arm    Patient Position: Sitting    Cuff Size: Adult    Pulse: (!) 42 92   SpO2: 100%    Weight: 89.4 kg (197 lb)    Height: 185.4 cm (73\")      Body mass index is 25.99 kg/m².    PHYSICAL EXAM:  Pulmonary:      Effort: Pulmonary effort is normal.      Breath sounds: Normal breath sounds.   Cardiovascular:      Normal rate. Regular rhythm.   Skin:     Comments: Midsternal incision well-approximated, mildly erythematous no drainage  Epigastric puncture sites erythematous no drainage scabbed           ECG 12 Lead    Date/Time: 2/14/2025 11:08 AM  Performed by: Liss Yanez APRN    Authorized by: Liss Yanez APRN  Comparison: compared with previous ECG   Similar to previous ECG  Rhythm: sinus rhythm  Ectopy: atrial premature contractions and bigeminy  Rate: normal    Clinical impression: abnormal EKG            Current Outpatient Medications   Medication Sig Dispense Refill    acetaminophen (TYLENOL) 325 MG tablet Take 2 tablets by mouth Every 4 (Four) Hours As Needed for Mild Pain.      amiodarone (PACERONE) 200 MG tablet Take 1 tablet by mouth Every 12 (Twelve) Hours for 7 days, THEN 1 tablet Daily for 14 days. 28 tablet 0    aspirin 81 MG EC tablet Take 1 tablet by mouth Daily for 90 days. 90 tablet 0    atorvastatin (LIPITOR) 40 MG tablet Take 1 tablet by mouth Every Night for 90 days. " 90 tablet 0    Eliquis 2.5 MG tablet tablet Take 1 tablet by mouth Every 12 (Twelve) Hours. 60 tablet 3    furosemide (LASIX) 40 MG tablet Take 1 tablet by mouth Daily for 21 days. 21 tablet 0    HYDROcodone-acetaminophen (NORCO) 5-325 MG per tablet Take 1 tablet by mouth Every 6 (Six) Hours As Needed for Moderate Pain for up to 6 days. 30 tablet 0    metoprolol tartrate (LOPRESSOR) 50 MG tablet Take 0.5 tablets by mouth Every 12 (Twelve) Hours for 90 days.      potassium chloride (KLOR-CON M20) 20 MEQ CR tablet Take 1 tablet by mouth Daily for 21 days. 21 tablet 0    tamsulosin (FLOMAX) 0.4 MG capsule 24 hr capsule Take 1 capsule by mouth Daily. 30 capsule 0    traZODone (DESYREL) 50 MG tablet TAKE ONE TABLET BY MOUTH ONCE NIGHTLY (Patient taking differently: Take 1 tablet by mouth Every Night.) 90 tablet 1     No current facility-administered medications for this visit.     Facility-Administered Medications Ordered in Other Visits   Medication Dose Route Frequency Provider Last Rate Last Admin    Chlorhexidine Gluconate Cloth 2 % pads 1 Application  1 Application Topical Q12H PRN Tre Goodwin PA-C         Assessment:       Diagnosis Plan   1. S/P AVR (aortic valve replacement)  ECG 12 Lead           Orders Placed This Encounter   Procedures    ECG 12 Lead     This order was created via procedure documentation     Order Specific Question:   Release to patient     Answer:   Routine Release [[id number]]         Plan:       1.  75-year-old gentleman with atrial fibrillation status post cardioversion May 2024.  He had postop A-fib 2/2025.  He has history of left atrial appendage epicardial closure.  And is now on amiodarone and Eliquis  -He will stop amiodarone once his prescription runs out.  He will continue Eliquis  -I sent a message to cardiac rehab staff to help ensure he gets scheduled   2.  Severe to valve regurgitation on MICHELLE September 2024 with mildly dilated aorta at 4.3 cm.  Now status post elective AVR  with a 29 mm magna ease pericardial prosthesis 02/5/2025,   -Baseline echo will be needed in approximately 3 months  3.  Left atrial appendage epicardial closure with a 45 mm atrial clip device on 2/5/25 by Dr. Martinez.   4.Hyperlipidemia rosuvastatin 5 mg  5. Hypertension-blood pressure little elevated today but normally better at home.  He also did not have metoprolol prior to coming in  6.nonobstructive, minimal coronary artery disease on cardiac catheterization.     7..BPH on tamsulosin-he is having some dysuria and taking Azo over-the-counter.  Have asked him to follow-up with urology and he states he has an appointment  8.  Tricuspid valve insufficiency- trace on MICHELLE 09/2024  9.  Aortic Root dilation at 4.3 cm on CTA September 2024  10. 9-10 mm right lower lobe nodule September 2024 and stable on repeat November 18, 2024.   Next CT is scheduled 05/2025        Follow-up in approximately 6 weeks          It has been a pleasure to participate in this patient's care.      Thank you,  KARI Lockhart      **I used Dragon to dictate this note:**

## 2025-02-21 ENCOUNTER — TELEPHONE (OUTPATIENT)
Dept: CARDIAC SURGERY | Facility: CLINIC | Age: 75
End: 2025-02-21

## 2025-02-21 ENCOUNTER — OFFICE VISIT (OUTPATIENT)
Dept: CARDIAC REHAB | Facility: HOSPITAL | Age: 75
End: 2025-02-21
Payer: MEDICARE

## 2025-02-21 DIAGNOSIS — Z95.2 S/P AVR (AORTIC VALVE REPLACEMENT): Primary | ICD-10-CM

## 2025-02-21 PROCEDURE — 93797 PHYS/QHP OP CAR RHAB WO ECG: CPT

## 2025-02-21 PROCEDURE — 93798 PHYS/QHP OP CAR RHAB W/ECG: CPT

## 2025-02-21 NOTE — TELEPHONE ENCOUNTER
"  Caller: Roney Kuhn \"MARIANELA\"    Relationship to patient: Self    Best call back number: [phone]     Chief complaint: WANTING TO RESCHEDULE    Type of visit: POST OP    Requested date: WEEK OF MARCH 17TH OR THE WEEK EARLIER     If rescheduling, when is the original appointment: 3.11.25    HUB COULD NOT RESCHEDULE WITHIN 72 HOURS      "

## 2025-02-24 ENCOUNTER — TREATMENT (OUTPATIENT)
Dept: CARDIAC REHAB | Facility: HOSPITAL | Age: 75
End: 2025-02-24
Payer: MEDICARE

## 2025-02-24 DIAGNOSIS — Z95.2 S/P AVR (AORTIC VALVE REPLACEMENT): Primary | ICD-10-CM

## 2025-02-24 PROCEDURE — 93798 PHYS/QHP OP CAR RHAB W/ECG: CPT

## 2025-02-26 ENCOUNTER — TREATMENT (OUTPATIENT)
Dept: CARDIAC REHAB | Facility: HOSPITAL | Age: 75
End: 2025-02-26
Payer: MEDICARE

## 2025-02-26 DIAGNOSIS — Z95.2 S/P AVR (AORTIC VALVE REPLACEMENT): Primary | ICD-10-CM

## 2025-02-26 PROCEDURE — 93798 PHYS/QHP OP CAR RHAB W/ECG: CPT

## 2025-02-28 ENCOUNTER — APPOINTMENT (OUTPATIENT)
Dept: CARDIAC REHAB | Facility: HOSPITAL | Age: 75
End: 2025-02-28
Payer: MEDICARE

## 2025-02-28 ENCOUNTER — APPOINTMENT (OUTPATIENT)
Dept: CT IMAGING | Facility: HOSPITAL | Age: 75
End: 2025-02-28
Payer: MEDICARE

## 2025-02-28 ENCOUNTER — HOSPITAL ENCOUNTER (OUTPATIENT)
Facility: HOSPITAL | Age: 75
Setting detail: OBSERVATION
Discharge: HOME | End: 2025-03-02
Attending: STUDENT IN AN ORGANIZED HEALTH CARE EDUCATION/TRAINING PROGRAM | Admitting: THORACIC SURGERY (CARDIOTHORACIC VASCULAR SURGERY)
Payer: MEDICARE

## 2025-02-28 ENCOUNTER — APPOINTMENT (OUTPATIENT)
Dept: GENERAL RADIOLOGY | Facility: HOSPITAL | Age: 75
End: 2025-02-28
Payer: MEDICARE

## 2025-02-28 DIAGNOSIS — S02.2XXA CLOSED FRACTURE OF NASAL BONE, INITIAL ENCOUNTER: ICD-10-CM

## 2025-02-28 DIAGNOSIS — R42 LIGHTHEADEDNESS: Primary | ICD-10-CM

## 2025-02-28 DIAGNOSIS — R55 SYNCOPE, UNSPECIFIED SYNCOPE TYPE: ICD-10-CM

## 2025-02-28 DIAGNOSIS — R55 SYNCOPE AND COLLAPSE: ICD-10-CM

## 2025-02-28 DIAGNOSIS — S09.93XA FACIAL INJURY, INITIAL ENCOUNTER: ICD-10-CM

## 2025-02-28 DIAGNOSIS — S09.90XA INJURY OF HEAD, INITIAL ENCOUNTER: ICD-10-CM

## 2025-02-28 LAB
ALBUMIN SERPL-MCNC: 4.2 G/DL (ref 3.5–5.2)
ALBUMIN/GLOB SERPL: 1.5 G/DL
ALP SERPL-CCNC: 94 U/L (ref 39–117)
ALT SERPL W P-5'-P-CCNC: 21 U/L (ref 1–41)
ANION GAP SERPL CALCULATED.3IONS-SCNC: 9.2 MMOL/L (ref 5–15)
AST SERPL-CCNC: 22 U/L (ref 1–40)
BASOPHILS # BLD AUTO: 0.02 10*3/MM3 (ref 0–0.2)
BASOPHILS NFR BLD AUTO: 0.3 % (ref 0–1.5)
BILIRUB SERPL-MCNC: 0.4 MG/DL (ref 0–1.2)
BUN SERPL-MCNC: 21 MG/DL (ref 8–23)
BUN/CREAT SERPL: 17.8 (ref 7–25)
CALCIUM SPEC-SCNC: 9.3 MG/DL (ref 8.6–10.5)
CHLORIDE SERPL-SCNC: 99 MMOL/L (ref 98–107)
CO2 SERPL-SCNC: 28.8 MMOL/L (ref 22–29)
CREAT SERPL-MCNC: 1.18 MG/DL (ref 0.76–1.27)
DEPRECATED RDW RBC AUTO: 43.7 FL (ref 37–54)
EGFRCR SERPLBLD CKD-EPI 2021: 63.6 ML/MIN/1.73
EOSINOPHIL # BLD AUTO: 0.22 10*3/MM3 (ref 0–0.4)
EOSINOPHIL NFR BLD AUTO: 3 % (ref 0.3–6.2)
ERYTHROCYTE [DISTWIDTH] IN BLOOD BY AUTOMATED COUNT: 12.5 % (ref 12.3–15.4)
GEN 5 1HR TROPONIN T REFLEX: 59 NG/L
GLOBULIN UR ELPH-MCNC: 2.8 GM/DL
GLUCOSE SERPL-MCNC: 133 MG/DL (ref 65–99)
HCT VFR BLD AUTO: 38 % (ref 37.5–51)
HGB BLD-MCNC: 12.4 G/DL (ref 13–17.7)
IMM GRANULOCYTES # BLD AUTO: 0.02 10*3/MM3 (ref 0–0.05)
IMM GRANULOCYTES NFR BLD AUTO: 0.3 % (ref 0–0.5)
INR PPP: 1.1
LYMPHOCYTES # BLD AUTO: 0.76 10*3/MM3 (ref 0.7–3.1)
LYMPHOCYTES NFR BLD AUTO: 10.3 % (ref 19.6–45.3)
MCH RBC QN AUTO: 30.8 PG (ref 26.6–33)
MCHC RBC AUTO-ENTMCNC: 32.6 G/DL (ref 31.5–35.7)
MCV RBC AUTO: 94.3 FL (ref 79–97)
MONOCYTES # BLD AUTO: 0.7 10*3/MM3 (ref 0.1–0.9)
MONOCYTES NFR BLD AUTO: 9.5 % (ref 5–12)
NEUTROPHILS NFR BLD AUTO: 5.66 10*3/MM3 (ref 1.7–7)
NEUTROPHILS NFR BLD AUTO: 76.6 % (ref 42.7–76)
NT-PROBNP SERPL-MCNC: 422.7 PG/ML (ref 0–1800)
PLATELET # BLD AUTO: 292 10*3/MM3 (ref 140–450)
PMV BLD AUTO: 8.7 FL (ref 6–12)
POTASSIUM SERPL-SCNC: 4 MMOL/L (ref 3.5–5.2)
PROT SERPL-MCNC: 7 G/DL (ref 6–8.5)
PROTHROMBIN TIME: 12.4 SECONDS (ref 11–15)
QT INTERVAL: 434 MS
QTC INTERVAL: 439 MS
RBC # BLD AUTO: 4.03 10*6/MM3 (ref 4.14–5.8)
SODIUM SERPL-SCNC: 137 MMOL/L (ref 136–145)
TROPONIN T % DELTA: -8
TROPONIN T NUMERIC DELTA: -5 NG/L
TROPONIN T SERPL HS-MCNC: 64 NG/L
WBC NRBC COR # BLD AUTO: 7.38 10*3/MM3 (ref 3.4–10.8)

## 2025-02-28 PROCEDURE — 84484 ASSAY OF TROPONIN QUANT: CPT | Performed by: STUDENT IN AN ORGANIZED HEALTH CARE EDUCATION/TRAINING PROGRAM

## 2025-02-28 PROCEDURE — G0378 HOSPITAL OBSERVATION PER HR: HCPCS

## 2025-02-28 PROCEDURE — 90715 TDAP VACCINE 7 YRS/> IM: CPT | Performed by: STUDENT IN AN ORGANIZED HEALTH CARE EDUCATION/TRAINING PROGRAM

## 2025-02-28 PROCEDURE — 99284 EMERGENCY DEPT VISIT MOD MDM: CPT | Performed by: STUDENT IN AN ORGANIZED HEALTH CARE EDUCATION/TRAINING PROGRAM

## 2025-02-28 PROCEDURE — 36415 COLL VENOUS BLD VENIPUNCTURE: CPT

## 2025-02-28 PROCEDURE — 70486 CT MAXILLOFACIAL W/O DYE: CPT

## 2025-02-28 PROCEDURE — 85610 PROTHROMBIN TIME: CPT

## 2025-02-28 PROCEDURE — 71045 X-RAY EXAM CHEST 1 VIEW: CPT

## 2025-02-28 PROCEDURE — 25010000002 TETANUS-DIPHTH-ACELL PERTUSSIS 5-2.5-18.5 LF-MCG/0.5 SUSPENSION PREFILLED SYRINGE: Performed by: STUDENT IN AN ORGANIZED HEALTH CARE EDUCATION/TRAINING PROGRAM

## 2025-02-28 PROCEDURE — 83880 ASSAY OF NATRIURETIC PEPTIDE: CPT | Performed by: STUDENT IN AN ORGANIZED HEALTH CARE EDUCATION/TRAINING PROGRAM

## 2025-02-28 PROCEDURE — 70450 CT HEAD/BRAIN W/O DYE: CPT

## 2025-02-28 PROCEDURE — 85025 COMPLETE CBC W/AUTO DIFF WBC: CPT | Performed by: STUDENT IN AN ORGANIZED HEALTH CARE EDUCATION/TRAINING PROGRAM

## 2025-02-28 PROCEDURE — 90471 IMMUNIZATION ADMIN: CPT | Performed by: STUDENT IN AN ORGANIZED HEALTH CARE EDUCATION/TRAINING PROGRAM

## 2025-02-28 PROCEDURE — 80053 COMPREHEN METABOLIC PANEL: CPT | Performed by: STUDENT IN AN ORGANIZED HEALTH CARE EDUCATION/TRAINING PROGRAM

## 2025-02-28 PROCEDURE — 99285 EMERGENCY DEPT VISIT HI MDM: CPT

## 2025-02-28 PROCEDURE — 93005 ELECTROCARDIOGRAM TRACING: CPT | Performed by: STUDENT IN AN ORGANIZED HEALTH CARE EDUCATION/TRAINING PROGRAM

## 2025-02-28 PROCEDURE — 93010 ELECTROCARDIOGRAM REPORT: CPT | Performed by: INTERNAL MEDICINE

## 2025-02-28 RX ORDER — TAMSULOSIN HYDROCHLORIDE 0.4 MG/1
0.4 CAPSULE ORAL DAILY
Status: DISCONTINUED | OUTPATIENT
Start: 2025-02-28 | End: 2025-02-28

## 2025-02-28 RX ORDER — LISINOPRIL 10 MG/1
10 TABLET ORAL DAILY
COMMUNITY

## 2025-02-28 RX ORDER — POTASSIUM CHLORIDE 1500 MG/1
20 TABLET, EXTENDED RELEASE ORAL DAILY
Status: DISCONTINUED | OUTPATIENT
Start: 2025-02-28 | End: 2025-03-02 | Stop reason: HOSPADM

## 2025-02-28 RX ORDER — LISINOPRIL 10 MG/1
10 TABLET ORAL
Status: DISCONTINUED | OUTPATIENT
Start: 2025-02-28 | End: 2025-03-02 | Stop reason: HOSPADM

## 2025-02-28 RX ORDER — ATORVASTATIN CALCIUM 20 MG/1
40 TABLET, FILM COATED ORAL NIGHTLY
Status: DISCONTINUED | OUTPATIENT
Start: 2025-02-28 | End: 2025-03-02 | Stop reason: HOSPADM

## 2025-02-28 RX ORDER — ASPIRIN 81 MG/1
81 TABLET ORAL DAILY
Status: DISCONTINUED | OUTPATIENT
Start: 2025-02-28 | End: 2025-03-02 | Stop reason: HOSPADM

## 2025-02-28 RX ORDER — ACETAMINOPHEN 325 MG/1
650 TABLET ORAL EVERY 4 HOURS PRN
Status: DISCONTINUED | OUTPATIENT
Start: 2025-02-28 | End: 2025-03-02 | Stop reason: HOSPADM

## 2025-02-28 RX ORDER — TAMSULOSIN HYDROCHLORIDE 0.4 MG/1
0.4 CAPSULE ORAL 2 TIMES DAILY
Status: DISCONTINUED | OUTPATIENT
Start: 2025-03-01 | End: 2025-03-01

## 2025-02-28 RX ADMIN — ASPIRIN 81 MG: 81 TABLET, COATED ORAL at 22:10

## 2025-02-28 RX ADMIN — TAMSULOSIN HYDROCHLORIDE 0.4 MG: 0.4 CAPSULE ORAL at 21:01

## 2025-02-28 RX ADMIN — TETANUS TOXOID, REDUCED DIPHTHERIA TOXOID AND ACELLULAR PERTUSSIS VACCINE, ADSORBED 0.5 ML: 5; 2.5; 8; 8; 2.5 SUSPENSION INTRAMUSCULAR at 10:43

## 2025-02-28 RX ADMIN — POTASSIUM CHLORIDE 20 MEQ: 1500 TABLET, EXTENDED RELEASE ORAL at 21:02

## 2025-02-28 RX ADMIN — ATORVASTATIN CALCIUM 40 MG: 20 TABLET, FILM COATED ORAL at 21:01

## 2025-02-28 NOTE — FSED PROVIDER NOTE
Subjective   History of Present Illness  Pt is a 75 y.o. male with PMH as listed who presents for   Chief Complaint   Patient presents with    Fall     PT STATES HE BECAME DIZZY LAST NIGHT AND FELL WHILE IN THE KITCHEN LAST NIGHT; +HIT HEAD; +LOC; +THINNERS        Patient is a 75-year-old male presents for episode of lightheadedness over the past 3 to 4 days with resultant fall last night.  He is on Eliquis for recent aortic valve replacement 3 weeks ago, has been doing well up until the last several days when he started to have episodes of lightheadedness.  No lightheadedness is present currently.  Denies any new chest pain shortness of breath nausea vomiting fevers.  When he fell last night he struck his left brow and face.  + LOC.    Review of Systems    Past Medical History:   Diagnosis Date    Aortic root dilation     Aortic valve insufficiency     Arthritis     Atrial fibrillation     Back problem     Basal cell carcinoma     FACE AND LT EAR    Cardiomyopathy     EF 45-50 % in 04/2024 on scanned media    Claustrophobia     Hyperlipidemia     Hypertension     Kidney stones 2021       No Known Allergies    Past Surgical History:   Procedure Laterality Date    AORTIC VALVE REPAIR/REPLACEMENT N/A 2/5/2025    Procedure: AORTIC VALVE REPLACEMENT; LEFT ATRIAL APPENDAGE EXCLUSION WITH ATRICLIP; PRP WITH TRANSESOPHAGEAL ECHOCARDIOGRAM;  Surgeon: Prasanth Martinez MD;  Location: [facility];  Service: Cardiothoracic;  Laterality: N/A;    APPENDECTOMY  1980    BASAL CELL CARCINOMA EXCISION      REMOVED FROM FACE   LOCAL    CARDIAC CATHETERIZATION N/A 11/20/2024    Procedure: Left Heart Cath;  Surgeon: Deisy Keller MD;  Location:  INVASIVE LOCATION;  Service: Cardiovascular;  Laterality: N/A;  preop- aortic valve surgery- rec from Dr. Martinez    CARDIAC CATHETERIZATION N/A 11/20/2024    Procedure: Coronary angiography;  Surgeon: Deisy Keller MD;  Location: [facility] CATH INVASIVE LOCATION;  Service:  Cardiovascular;  Laterality: N/A;    CATARACT EXTRACTION W/ INTRAOCULAR LENS IMPLANT Bilateral 2015    COLONOSCOPY  2017    Date unknown    INGUINAL HERNIA REPAIR Bilateral     KIDNEY STONE SURGERY  2021    KNEE ARTHROSCOPY W/ MENISCAL REPAIR Right 2014    ROTATOR CUFF REPAIR Right 2009    WISDOM TOOTH EXTRACTION         Family History   Problem Relation Age of Onset    Arthritis Mother     Hypertension Father     Hyperlipidemia Father         blood clots    Arthritis Father     Clotting disorder Father     Heart attack Father     Heart disease Father         Heart Attack    Breast cancer Sister     Hypertension Brother     Malig Hyperthermia Neg Hx        Social History     Socioeconomic History    Marital status:     Number of children: 3   Tobacco Use    Smoking status: Never     Passive exposure: Never    Smokeless tobacco: Never    Tobacco comments:     Caffeine - 2 cups coffee daily    Vaping Use    Vaping status: Never Used   Substance and Sexual Activity    Alcohol use: Yes     Alcohol/week: 8.0 standard drinks of alcohol     Types: 8 Drinks containing 0.5 oz of alcohol per week     Comment: weekly    Drug use: No    Sexual activity: Defer     Partners: Female     Birth control/protection: Vasectomy           Objective   Physical Exam  Constitutional:       Appearance: Normal appearance.   HENT:      Head: Normocephalic.      Comments: Contusion to left brow and abrasion over left brow and face.  With overlying ecchymoses.     Mouth/Throat:      Mouth: Mucous membranes are moist.      Pharynx: Oropharynx is clear.   Eyes:      Conjunctiva/sclera: Conjunctivae normal.   Cardiovascular:      Rate and Rhythm: Normal rate and regular rhythm.   Pulmonary:      Effort: Pulmonary effort is normal.      Breath sounds: Normal breath sounds.   Abdominal:      General: Abdomen is flat.      Palpations: Abdomen is soft.   Musculoskeletal:      Cervical back: Neck supple.      Comments: Skin tear to right forearm,  no areas of bony tenderness NVM intact to right upper extremity.   Skin:     General: Skin is warm and dry.   Neurological:      Mental Status: He is alert.   Psychiatric:         Mood and Affect: Mood normal.         Procedures           ED Course  ED Course as of 02/28/25 1546 Fri Feb 28, 2025   1032 Patient is a 75-year-old male presents for episode of lightheadedness over the past 3 to 4 days with resultant fall last night.  He is on Eliquis for recent aortic valve replacement 3 weeks ago, has been doing well up until the last several days when he started to have episodes of lightheadedness.  No lightheadedness is present currently.  Denies any new chest pain shortness of breath nausea vomiting fevers.  When he fell last night he struck his left brow and face.  Given being on Eliquis his provider recommended he present to the ED for further evaluation and management and CTs.  Given new onset lightheadedness will obtain CBC CMP troponin coags BNP EKG chest x-ray in addition to imaging of his head and facial bones. [JF]   1220 Patient's lab work is all fairly unremarkable other than troponin of 60 4 repeat 59.  Reassuring.  All imaging unremarkable other than new nasal bone fracture.  Given recent aortic valve replacement however will speak with Dr. Martinez with cardiothoracic surgery regarding recommendations. [JF]   1221 Dr. Martinez would like patient to be admitted to himself under observation for cardiac monitoring and further management.  Spoke with patient regarding this plan, he understands and agrees.  All questions answered. [JF]   1544 Pt is adamant he wanted to transfer to [facility] by POV.  Discussed with him that he will be admitted for cardiac monitoring and the risks associated with this.  He is normal ankle by ambulance if at all possible.  Given that his vitals been stable while here with normal heart rate and blood pressure no arrhythmias noted on the monitor while waiting for bed  placement will let patient travel by POV under the conditions that he is taken to his vehicle by wheelchair and his wife gets a wheelchair for him upon arrival at [facility].  They are agreeable with this and will transfer POV at their request. [JF]      ED Course User Index  [JF] Ulises James MD                                           Medical Decision Making  My differential diagnosis for dizziness included but is not limited to:  Labyrinthitis, vertigo, concussion, intracranial hemorrhage, stroke, migraine headache, cardiac arrhythmias, acute MI, hypotension, hypertension, hypoxia, inner ear and middle ear infections, anemia, electrolyte abnormalities, dehydration, hyperventilation and anxiety attacks..    My differential diagnosis includes but is not limited to cerebral contusion, cervical strain, concussion with LOC, concussion without LOC, contusion, fracture of the skull, orbits or mandible, hematoma, intracranial hemorrhage including subdural, epidural, subarachnoid and intracerebral, laceration and postconcussion syndrome      Problems Addressed:  Closed fracture of nasal bone, initial encounter: complicated acute illness or injury  Facial injury, initial encounter: complicated acute illness or injury  Injury of head, initial encounter: complicated acute illness or injury  Lightheadedness: complicated acute illness or injury    Amount and/or Complexity of Data Reviewed  Labs: ordered. Decision-making details documented in ED Course.  Radiology: ordered. Decision-making details documented in ED Course.  ECG/medicine tests: ordered.     Details: EKG  2/28/2025 at 1031  Rhythm sinus, rate 62  Normal axis, normal QT interval, nonspecific ST changes, EKG similar to prior from 2/8/2025  EKG interpreted by me contemporaneously by me with care  Discussion of management or test interpretation with external provider(s): Per ED workup    Risk  Prescription drug management.  Decision regarding  hospitalization.        Final diagnoses:   Lightheadedness   Facial injury, initial encounter   Closed fracture of nasal bone, initial encounter   Injury of head, initial encounter       ED Disposition  ED Disposition       ED Disposition   Decision to Admit    Condition   --    Comment   Level of Care: Telemetry [5]   Diagnosis: Syncope [135388]   Admitting Physician: ANA PAULA SILVESTRE [[id number]]   Is patient appropriate for Inpatient Observation Unit?: Yes [1]                 No follow-up provider specified.       Medication List      No changes were made to your prescriptions during this visit.

## 2025-02-28 NOTE — ED NOTES
Nursing report ED to floor  Roney Kuhn  75 y.o.  male    HPI :  HPI  Stated Reason for Visit: FALL    Chief Complaint  Chief Complaint   Patient presents with    Fall     PT STATES HE BECAME DIZZY LAST NIGHT AND FELL WHILE IN THE KITCHEN LAST NIGHT; +HIT HEAD; +LOC; +THINNERS        Admitting doctor:   Prasanth Martinez MD    Admitting diagnosis:   The primary encounter diagnosis was Lightheadedness. Diagnoses of Facial injury, initial encounter, Closed fracture of nasal bone, initial encounter, and Injury of head, initial encounter were also pertinent to this visit.    Code status:   Current Code Status       Date Active Code Status Order ID Comments User Context       Prior            Allergies:   Patient has no known allergies.    Isolation:   No active isolations    Intake and Output    Intake/Output Summary (Last 24 hours) at 2/28/2025 1532  Last data filed at 2/28/2025 1439  Gross per 24 hour   Intake --   Output 300 ml   Net -300 ml       Weight:       02/28/25  1030   Weight: 86.2 kg (190 lb)       Most recent vitals:   Vitals:    02/28/25 1200 02/28/25 1230 02/28/25 1300 02/28/25 1400   BP: 111/75 107/79 124/83 123/75   BP Location:    Right arm   Patient Position:    Lying   Pulse: 60 72 63 64   Resp: 17 17 17 18   Temp:       TempSrc:       SpO2: 96% 100% 100% 96%   Weight:       Height:           Active LDAs/IV Access:   Lines, Drains & Airways       Active LDAs       Name Placement date Placement time Site Days    Peripheral IV 02/28/25 1041 Right Antecubital 02/28/25  1041  Antecubital  less than 1                    Labs (abnormal labs have a star):   Labs Reviewed   COMPREHENSIVE METABOLIC PANEL - Abnormal; Notable for the following components:       Result Value    Glucose 133 (*)     All other components within normal limits    Narrative:     GFR Categories in Chronic Kidney Disease (CKD)      GFR Category          GFR (mL/min/1.73)    Interpretation  G1                     90 or greater          Normal or high (1)  G2                      60-89                Mild decrease (1)  G3a                   45-59                Mild to moderate decrease  G3b                   30-44                Moderate to severe decrease  G4                    15-29                Severe decrease  G5                    14 or less           Kidney failure          (1)In the absence of evidence of kidney disease, neither GFR category G1 or G2 fulfill the criteria for CKD.    eGFR calculation 2021 CKD-EPI creatinine equation, which does not include race as a factor   TROPONIN - Abnormal; Notable for the following components:    HS Troponin T 64 (*)     All other components within normal limits    Narrative:     High Sensitive Troponin T Reference Range:  <14.0 ng/L- Negative Female for AMI  <22.0 ng/L- Negative Male for AMI  >=14 - Abnormal Female indicating possible myocardial injury.  >=22 - Abnormal Male indicating possible myocardial injury.   Clinicians would have to utilize clinical acumen, EKG, Troponin, and serial changes to determine if it is an Acute Myocardial Infarction or myocardial injury due to an underlying chronic condition.        CBC WITH AUTO DIFFERENTIAL - Abnormal; Notable for the following components:    RBC 4.03 (*)     Hemoglobin 12.4 (*)     Neutrophil % 76.6 (*)     Lymphocyte % 10.3 (*)     All other components within normal limits   HIGH SENSITIVITIY TROPONIN T 1HR - Abnormal; Notable for the following components:    HS Troponin T 59 (*)     All other components within normal limits    Narrative:     High Sensitive Troponin T Reference Range:  <14.0 ng/L- Negative Female for AMI  <22.0 ng/L- Negative Male for AMI  >=14 - Abnormal Female indicating possible myocardial injury.  >=22 - Abnormal Male indicating possible myocardial injury.   Clinicians would have to utilize clinical acumen, EKG, Troponin, and serial changes to determine if it is an Acute Myocardial Infarction or myocardial injury due  to an underlying chronic condition.        BNP (IN-HOUSE) - Normal    Narrative:     This assay is used as an aid in the diagnosis of individuals suspected of having heart failure. It can be used as an aid in the diagnosis of acute decompensated heart failure (ADHF) in patients presenting with signs and symptoms of ADHF to the emergency department (ED). In addition, NT-proBNP of <300 pg/mL indicates ADHF is not likely.    Age Range Result Interpretation  NT-proBNP Concentration (pg/mL:      <50             Positive            >450                   Gray                 300-450                    Negative             <300    50-75           Positive            >900                  Gray                300-900                  Negative            <300      >75             Positive            >1800                  Gray                300-1800                  Negative            <300   LAB PROTIME-INR, FINGERSTICK   POCT PROTIME - INR   CBC AND DIFFERENTIAL    Narrative:     The following orders were created for panel order CBC & Differential.  Procedure                               Abnormality         Status                     ---------                               -----------         ------                     CBC Auto Differential[[id number]]        Abnormal            Final result                 Please view results for these tests on the individual orders.       EKG:   ECG 12 Lead Syncope   Preliminary Result   HEART RATE=62  bpm   RR Ujozwlcn=256  ms   AZ Tqjuuvic=418  ms   P Horizontal Axis=-14  deg   P Front Axis=44  deg   QRSD Inlkofuw=281  ms   QT Rpydfylp=003  ms   KEyH=346  ms   QRS Axis=-15  deg   T Wave Axis=131  deg   - ABNORMAL ECG -   Sinus rhythm   Probable left atrial enlargement   LVH with secondary repolarization abnormality   Date and Time of Study:2025-02-28 10:31:57          Meds given in ED:   Medications   Tetanus-Diphth-Acell Pertussis (BOOSTRIX) injection 0.5 mL (0.5 mL Intramuscular  Given 2/28/25 1043)       Imaging results:  XR Chest 1 View    Result Date: 2/28/2025  No focal pulmonary consolidation. Tortuous aorta. Follow-up as clinically indicated.  This report was finalized on 2/28/2025 11:25 AM by Dr. Nathan Sanchez M.D on Workstation: [id number]      CT Head Without Contrast    Result Date: 2/28/2025  No acute process.  CT OF THE FACIAL BONES WITHOUT CONTRAST  Axial images were obtained through the facial bones without contrast. Sagittal and coronal reconstruction images were reviewed.  There is minimal irregularity of the nasal bones consistent with minimally displaced nasal bone fracture. Please correlate with the clinical findings. No other facial bone fractures are seen. Small suspected mucous retention cyst is seen in the posterior right maxillary sinus. Sinuses otherwise appear clear.  IMPRESSION: 1. Mildly displaced nasal bone fracture. Please correlate with the clinical findings. 2. Small mucous retention cyst in the right maxillary sinus. 3. No other fractures are seen.  Radiation dose reduction techniques were utilized, including automated exposure control and exposure modulation based on body size.       CT Facial Bones Without Contrast    Result Date: 2/28/2025  No acute process.  CT OF THE FACIAL BONES WITHOUT CONTRAST  Axial images were obtained through the facial bones without contrast. Sagittal and coronal reconstruction images were reviewed.  There is minimal irregularity of the nasal bones consistent with minimally displaced nasal bone fracture. Please correlate with the clinical findings. No other facial bone fractures are seen. Small suspected mucous retention cyst is seen in the posterior right maxillary sinus. Sinuses otherwise appear clear.  IMPRESSION: 1. Mildly displaced nasal bone fracture. Please correlate with the clinical findings. 2. Small mucous retention cyst in the right maxillary sinus. 3. No other fractures are seen.  Radiation dose reduction techniques  were utilized, including automated exposure control and exposure modulation based on body size.        Ambulatory status:   - stand by    Social issues:   Social History     Socioeconomic History    Marital status:     Number of children: 3   Tobacco Use    Smoking status: Never     Passive exposure: Never    Smokeless tobacco: Never    Tobacco comments:     Caffeine - 2 cups coffee daily    Vaping Use    Vaping status: Never Used   Substance and Sexual Activity    Alcohol use: Yes     Alcohol/week: 8.0 standard drinks of alcohol     Types: 8 Drinks containing 0.5 oz of alcohol per week     Comment: weekly    Drug use: No    Sexual activity: Defer     Partners: Female     Birth control/protection: Vasectomy       Peripheral Neurovascular  Peripheral Neurovascular (Adult)  Peripheral Neurovascular WDL: WDL    Neuro Cognitive  Neuro Cognitive (Adult)  Cognitive/Neuro/Behavioral WDL: WDL    Learning  Learning Assessment  Learning Readiness and Ability: no barriers identified    Respiratory  Respiratory  Airway WDL: WDL  Respiratory WDL  Respiratory WDL: WDL    Abdominal Pain       Pain Assessments  Pain (Adult)  (0-10) Pain Rating: Rest: 2  (0-10) Pain Rating: Activity: 2    NIH Stroke Scale       Génesis Oswald RN  02/28/25 15:32 EST

## 2025-03-01 ENCOUNTER — APPOINTMENT (OUTPATIENT)
Dept: CARDIOLOGY | Facility: HOSPITAL | Age: 75
End: 2025-03-01
Payer: MEDICARE

## 2025-03-01 LAB
AORTIC ARCH: 2.7 CM
AORTIC DIMENSIONLESS INDEX: 0.5 (DI)
ASCENDING AORTA: 3.8 CM
AV MEAN PRESS GRAD SYS DOP V1V2: 12.1 MMHG
AV VMAX SYS DOP: 239.1 CM/SEC
BASOPHILS # BLD AUTO: 0.02 10*3/MM3 (ref 0–0.2)
BASOPHILS NFR BLD AUTO: 0.3 % (ref 0–1.5)
BH CV ECHO AV AORTIC VALVE AT ACCEL TIME CALCULATED: 80 MSEC
BH CV ECHO MEAS - ACS: 1 CM
BH CV ECHO MEAS - AO MAX PG: 22.9 MMHG
BH CV ECHO MEAS - AO ROOT AREA (BSA CORRECTED): 1.8 CM2
BH CV ECHO MEAS - AO ROOT DIAM: 3.8 CM
BH CV ECHO MEAS - AO V2 VTI: 44.4 CM
BH CV ECHO MEAS - AT: 0.08 SEC
BH CV ECHO MEAS - AVA(I,D): 1.7 CM2
BH CV ECHO MEAS - EDV(CUBED): 111.8 ML
BH CV ECHO MEAS - EDV(MOD-SP2): 175 ML
BH CV ECHO MEAS - EDV(MOD-SP4): 181 ML
BH CV ECHO MEAS - EF(MOD-SP2): 62.3 %
BH CV ECHO MEAS - EF(MOD-SP4): 58 %
BH CV ECHO MEAS - ESV(CUBED): 35.4 ML
BH CV ECHO MEAS - ESV(MOD-SP2): 66 ML
BH CV ECHO MEAS - ESV(MOD-SP4): 76 ML
BH CV ECHO MEAS - FS: 31.8 %
BH CV ECHO MEAS - IVS/LVPW: 1.11 CM
BH CV ECHO MEAS - IVSD: 0.99 CM
BH CV ECHO MEAS - LAT PEAK E' VEL: 14.7 CM/SEC
BH CV ECHO MEAS - LV DIASTOLIC VOL/BSA (35-75): 85.7 CM2
BH CV ECHO MEAS - LV MASS(C)D: 157.4 GRAMS
BH CV ECHO MEAS - LV MAX PG: 5.6 MMHG
BH CV ECHO MEAS - LV MEAN PG: 3 MMHG
BH CV ECHO MEAS - LV SYSTOLIC VOL/BSA (12-30): 36 CM2
BH CV ECHO MEAS - LV V1 MAX: 117.9 CM/SEC
BH CV ECHO MEAS - LV V1 VTI: 22.1 CM
BH CV ECHO MEAS - LVIDD: 4.8 CM
BH CV ECHO MEAS - LVIDS: 3.3 CM
BH CV ECHO MEAS - LVOT AREA: 3.4 CM2
BH CV ECHO MEAS - LVOT DIAM: 2.09 CM
BH CV ECHO MEAS - LVPWD: 0.89 CM
BH CV ECHO MEAS - MED PEAK E' VEL: 8.6 CM/SEC
BH CV ECHO MEAS - MV A DUR: 0.12 SEC
BH CV ECHO MEAS - MV A MAX VEL: 42.7 CM/SEC
BH CV ECHO MEAS - MV DEC SLOPE: 181.3 CM/SEC2
BH CV ECHO MEAS - MV DEC TIME: 0.32 SEC
BH CV ECHO MEAS - MV E MAX VEL: 51.9 CM/SEC
BH CV ECHO MEAS - MV E/A: 1.22
BH CV ECHO MEAS - MV MAX PG: 1.46 MMHG
BH CV ECHO MEAS - MV MEAN PG: 0.96 MMHG
BH CV ECHO MEAS - MV P1/2T: 101.5 MSEC
BH CV ECHO MEAS - MV V2 VTI: 20.2 CM
BH CV ECHO MEAS - MVA(P1/2T): 2.17 CM2
BH CV ECHO MEAS - MVA(VTI): 3.7 CM2
BH CV ECHO MEAS - PA ACC TIME: 0.08 SEC
BH CV ECHO MEAS - PA V2 MAX: 68.8 CM/SEC
BH CV ECHO MEAS - RAP SYSTOLE: 3 MMHG
BH CV ECHO MEAS - RV MAX PG: 1.62 MMHG
BH CV ECHO MEAS - RV V1 MAX: 63.5 CM/SEC
BH CV ECHO MEAS - RV V1 VTI: 8.9 CM
BH CV ECHO MEAS - RVSP: 26 MMHG
BH CV ECHO MEAS - SV(LVOT): 75.5 ML
BH CV ECHO MEAS - SV(MOD-SP2): 109 ML
BH CV ECHO MEAS - SV(MOD-SP4): 105 ML
BH CV ECHO MEAS - SVI(LVOT): 35.8 ML/M2
BH CV ECHO MEAS - SVI(MOD-SP2): 51.6 ML/M2
BH CV ECHO MEAS - SVI(MOD-SP4): 49.7 ML/M2
BH CV ECHO MEAS - TAPSE (>1.6): 1.57 CM
BH CV ECHO MEAS - TR MAX PG: 23.8 MMHG
BH CV ECHO MEAS - TR MAX VEL: 243.7 CM/SEC
BH CV ECHO MEASUREMENTS AVERAGE E/E' RATIO: 4.45
BH CV XLRA - RV BASE: 4 CM
BH CV XLRA - TDI S': 8.2 CM/SEC
DEPRECATED RDW RBC AUTO: 43 FL (ref 37–54)
EOSINOPHIL # BLD AUTO: 0.26 10*3/MM3 (ref 0–0.4)
EOSINOPHIL NFR BLD AUTO: 4.3 % (ref 0.3–6.2)
ERYTHROCYTE [DISTWIDTH] IN BLOOD BY AUTOMATED COUNT: 12.7 % (ref 12.3–15.4)
HCT VFR BLD AUTO: 36.2 % (ref 37.5–51)
HGB BLD-MCNC: 12.5 G/DL (ref 13–17.7)
IMM GRANULOCYTES # BLD AUTO: 0.02 10*3/MM3 (ref 0–0.05)
IMM GRANULOCYTES NFR BLD AUTO: 0.3 % (ref 0–0.5)
LEFT ATRIUM VOLUME INDEX: 22.3 ML/M2
LV EF BIPLANE MOD: 56.8 %
LYMPHOCYTES # BLD AUTO: 1.13 10*3/MM3 (ref 0.7–3.1)
LYMPHOCYTES NFR BLD AUTO: 18.5 % (ref 19.6–45.3)
MCH RBC QN AUTO: 31.9 PG (ref 26.6–33)
MCHC RBC AUTO-ENTMCNC: 34.5 G/DL (ref 31.5–35.7)
MCV RBC AUTO: 92.3 FL (ref 79–97)
MONOCYTES # BLD AUTO: 0.56 10*3/MM3 (ref 0.1–0.9)
MONOCYTES NFR BLD AUTO: 9.2 % (ref 5–12)
NEUTROPHILS NFR BLD AUTO: 4.11 10*3/MM3 (ref 1.7–7)
NEUTROPHILS NFR BLD AUTO: 67.4 % (ref 42.7–76)
NRBC BLD AUTO-RTO: 0 /100 WBC (ref 0–0.2)
NT-PROBNP SERPL-MCNC: 364 PG/ML (ref 0–1800)
PLATELET # BLD AUTO: 261 10*3/MM3 (ref 140–450)
PMV BLD AUTO: 9.1 FL (ref 6–12)
RBC # BLD AUTO: 3.92 10*6/MM3 (ref 4.14–5.8)
SINUS: 4.3 CM
STJ: 4.2 CM
WBC NRBC COR # BLD AUTO: 6.1 10*3/MM3 (ref 3.4–10.8)

## 2025-03-01 PROCEDURE — 93306 TTE W/DOPPLER COMPLETE: CPT

## 2025-03-01 PROCEDURE — G0378 HOSPITAL OBSERVATION PER HR: HCPCS

## 2025-03-01 PROCEDURE — 99214 OFFICE O/P EST MOD 30 MIN: CPT

## 2025-03-01 PROCEDURE — 83880 ASSAY OF NATRIURETIC PEPTIDE: CPT | Performed by: THORACIC SURGERY (CARDIOTHORACIC VASCULAR SURGERY)

## 2025-03-01 PROCEDURE — 99024 POSTOP FOLLOW-UP VISIT: CPT | Performed by: THORACIC SURGERY (CARDIOTHORACIC VASCULAR SURGERY)

## 2025-03-01 PROCEDURE — 25510000001 PERFLUTREN 6.52 MG/ML SUSPENSION 2 ML VIAL: Performed by: THORACIC SURGERY (CARDIOTHORACIC VASCULAR SURGERY)

## 2025-03-01 PROCEDURE — 85025 COMPLETE CBC W/AUTO DIFF WBC: CPT | Performed by: THORACIC SURGERY (CARDIOTHORACIC VASCULAR SURGERY)

## 2025-03-01 PROCEDURE — 93306 TTE W/DOPPLER COMPLETE: CPT | Performed by: INTERNAL MEDICINE

## 2025-03-01 RX ORDER — TAMSULOSIN HYDROCHLORIDE 0.4 MG/1
0.4 CAPSULE ORAL NIGHTLY
Status: DISCONTINUED | OUTPATIENT
Start: 2025-03-02 | End: 2025-03-02 | Stop reason: HOSPADM

## 2025-03-01 RX ADMIN — ATORVASTATIN CALCIUM 40 MG: 20 TABLET, FILM COATED ORAL at 20:16

## 2025-03-01 RX ADMIN — PERFLUTREN 2 ML: 6.52 INJECTION, SUSPENSION INTRAVENOUS at 13:17

## 2025-03-01 RX ADMIN — ASPIRIN 81 MG: 81 TABLET, COATED ORAL at 10:31

## 2025-03-01 RX ADMIN — LISINOPRIL 10 MG: 10 TABLET ORAL at 10:31

## 2025-03-01 RX ADMIN — POTASSIUM CHLORIDE 20 MEQ: 1500 TABLET, EXTENDED RELEASE ORAL at 10:31

## 2025-03-01 NOTE — CONSULTS
Date of Consultation: 25    Referral Provider: Lizz Wiggins MD     Reason for Consultation: Syncope    Encounter Provider: KARI Garcia    Group of Service: [facility]     Patient Name: Roney Kuhn    :1950    Chief complaint: Syncope    History of Present Illness: Roney Kuhn is a 75-year-old male with a past medical history that is significant for fibrillation, severe aortic valve insufficiency status post AVR, hypertension, hyperlipidemia, cardiomyopathy, aortic root dilation, right lung nodule, and BPH.    On 2025 he underwent elective AVR with a 29 mm Magna Ease pericardial prosthesis and left atrial appendage epicardial closure with a 45 mm atrial clip device with Dr. Martinez.  He did have postoperative atrial fibrillation was placed on amiodarone.  He did have some issues with urinary retention postoperatively and followed up with urology as an outpatient.  He has been taking Flomax twice daily.    Otherwise he had been recovering well until 4 days ago he developed lightheadedness.  This resulted in a fall on 2025.  He reached out to his PCP who recommended he be evaluated in the ED since he is on Eliquis.  He did hit his head when he fell, striking his left brow and face.  He denies any chest pain, shortness of breath, palpitations, nausea, vomiting, fevers, chills, or cough.    Workup included: High-sensitivity troponin 64 then 59. proBNP 364.  CMP unremarkable.  Hemoglobin 12.4.  Chest x-ray with no acute findings.  CT of the head and face showed mildly displaced nasal bone fracture.  EKG showed sinus rhythm with a rate of 62 bpm.    On exam he was resting in bed with his spouse at bedside.  He feels better today but has not ambulated.  He denies chest pain or discomfort, palpitations, or shortness of breath.     Case discussed at bedside with Dr. Martinez and KARI Avila.     Echocardiogram 3/1/2025    Left ventricular systolic  function is normal. Calculated left ventricular EF = 56.8%    Left ventricular diastolic function was normal.    There is a bioprosthetic aortic valve present.  Appears to be functioning within normal limits    Aortic valve area is 1.7 cm2.    Aortic valve maximum pressure gradient is 23 mmHg. Aortic valve mean pressure gradient is 12 mmHg.    Mild tricuspid valve regurgitation is present.    Estimated right ventricular systolic pressure from tricuspid regurgitation is normal (<35 mmHg). Calculated right ventricular systolic pressure from tricuspid regurgitation is 26 mmHg.    Mild dilation of the sinuses of Valsalva is present.    Cardiac catheterization 11/20/2024  CONCLUSION:  1.  No significant coronary atherosclerotic disease.        RECOMMENDATIONS:  Continue with surgical evaluation for severe aortic valve regurgitation as planned.     FINDINGS:  SELECTIVE CORONARY ANGIOGRAMS:  1.  Left main artery: Large-caliber vessel with 0% stenosis.  The vessel trifurcates into left anterior descending, ramus intermedius, and left circumflex arteries.  2.  Left anterior descending artery: Proximally a large caliber vessel with 10% stenosis.  He describes a small caliber first and second diagonal branches both with no significant disease.  The mid LAD remains a moderate caliber vessel with 0% stenosis.  The distal LAD tapers to small caliber and appears to wrap and apex supplying the inferoapical wall and has no soca and stenosis.  3.  Ramus intermedius: Moderate caliber, branching vessel with proximal luminal irregularities.  4.  Left circumflex artery: Small caliber vessel with 0% proximal to mid stenosis.  The mid vessel gives rise to small caliber obtuse marginal branch with no significant disease.  5.  Right coronary artery: Large-caliber vessel with 10 to 20% proximal stenosis.  The mid to distal vessel has no significant disease.  The distal vessel bifurcates into a small caliber posterior descending and a large  caliber, branching posterolateral branch both with no significant stenosis.  This is a right dominant system.    Past Medical History:   Diagnosis Date    Aortic root dilation     Aortic valve insufficiency     Arthritis     Atrial fibrillation     Back problem     Basal cell carcinoma     FACE AND LT EAR    Cardiomyopathy     EF 45-50 % in 04/2024 on scanned media    Claustrophobia     Hyperlipidemia     Hypertension     Kidney stones 2021         Past Surgical History:   Procedure Laterality Date    AORTIC VALVE REPAIR/REPLACEMENT N/A 2/5/2025    Procedure: AORTIC VALVE REPLACEMENT; LEFT ATRIAL APPENDAGE EXCLUSION WITH ATRICLIP; PRP WITH TRANSESOPHAGEAL ECHOCARDIOGRAM;  Surgeon: Prasanth Martinez MD;  Location: [facility];  Service: Cardiothoracic;  Laterality: N/A;    APPENDECTOMY  1980    BASAL CELL CARCINOMA EXCISION      REMOVED FROM FACE   LOCAL    CARDIAC CATHETERIZATION N/A 11/20/2024    Procedure: Left Heart Cath;  Surgeon: Deisy Keller MD;  Location:  [facility];  Service: Cardiovascular;  Laterality: N/A;  preop- aortic valve surgery- rec from Dr. Martinez    CARDIAC CATHETERIZATION N/A 11/20/2024    Procedure: Coronary angiography;  Surgeon: Deisy Keller MD;  Location:  [facility];  Service: Cardiovascular;  Laterality: N/A;    CATARACT EXTRACTION W/ INTRAOCULAR LENS IMPLANT Bilateral 2015    COLONOSCOPY  2017    Date unknown    INGUINAL HERNIA REPAIR Bilateral     KIDNEY STONE SURGERY  2021    KNEE ARTHROSCOPY W/ MENISCAL REPAIR Right 2014    ROTATOR CUFF REPAIR Right 2009    WISDOM TOOTH EXTRACTION           No Known Allergies      Current Facility-Administered Medications on File Prior to Encounter   Medication Dose Route Frequency Provider Last Rate Last Admin    Chlorhexidine Gluconate Cloth 2 % pads 1 Application  1 Application Topical Q12H PRN Tre Goodwin PA-C         Current Outpatient Medications on File Prior to Encounter   Medication Sig Dispense  Refill    acetaminophen (TYLENOL) 325 MG tablet Take 2 tablets by mouth Every 4 (Four) Hours As Needed for Mild Pain.      amiodarone (PACERONE) 200 MG tablet Take 1 tablet by mouth Every 12 (Twelve) Hours for 7 days, THEN 1 tablet Daily for 14 days. 28 tablet 0    aspirin 81 MG EC tablet Take 1 tablet by mouth Daily for 90 days. 90 tablet 0    atorvastatin (LIPITOR) 40 MG tablet Take 1 tablet by mouth Every Night for 90 days. 90 tablet 0    Eliquis 2.5 MG tablet tablet Take 1 tablet by mouth Every 12 (Twelve) Hours. 60 tablet 3    furosemide (LASIX) 40 MG tablet Take 1 tablet by mouth Daily for 21 days. 21 tablet 0    lisinopril (PRINIVIL,ZESTRIL) 10 MG tablet Take 1 tablet by mouth Daily.      metoprolol tartrate (LOPRESSOR) 50 MG tablet Take 0.5 tablets by mouth Every 12 (Twelve) Hours for 90 days.      potassium chloride (KLOR-CON M20) 20 MEQ CR tablet Take 1 tablet by mouth Daily for 21 days. 21 tablet 0    tamsulosin (FLOMAX) 0.4 MG capsule 24 hr capsule Take 1 capsule by mouth Daily. 30 capsule 0    traZODone (DESYREL) 50 MG tablet TAKE ONE TABLET BY MOUTH ONCE NIGHTLY (Patient taking differently: Take 1 tablet by mouth Every Night.) 90 tablet 1         Social History     Socioeconomic History    Marital status:     Number of children: 3   Tobacco Use    Smoking status: Never     Passive exposure: Never    Smokeless tobacco: Never    Tobacco comments:     Caffeine - 2 cups coffee daily    Vaping Use    Vaping status: Never Used   Substance and Sexual Activity    Alcohol use: Yes     Alcohol/week: 8.0 standard drinks of alcohol     Types: 8 Drinks containing 0.5 oz of alcohol per week     Comment: weekly    Drug use: No    Sexual activity: Defer     Partners: Female     Birth control/protection: Vasectomy         Family History   Problem Relation Age of Onset    Arthritis Mother     Hypertension Father     Hyperlipidemia Father         blood clots    Arthritis Father     Clotting disorder Father      "Heart attack Father     Heart disease Father         Heart Attack    Breast cancer Sister     Hypertension Brother     Malig Hyperthermia Neg Hx        REVIEW OF SYSTEMS:   12 point ROS was performed and is negative except as outlined in HPI       Objective:     Vitals:    03/01/25 1019 03/01/25 1022 03/01/25 1317 03/01/25 1344   BP: 108/80 118/84 118/84 129/77   BP Location: Left arm Left arm  Right arm   Patient Position: Standing Standing  Lying   Pulse: 82 82  65   Resp:    16   Temp:    97.9 °F (36.6 °C)   TempSrc:    Oral   SpO2: 97%   99%   Weight:   84.8 kg (187 lb)    Height:   188 cm (74\")      Body mass index is 24.01 kg/m².  Flowsheet Rows      Flowsheet Row First Filed Value   Admission Height 188 cm (74\") Documented at 02/28/2025 1030   Admission Weight 86.2 kg (190 lb) Documented at 02/28/2025 1030              Physical Exam  Vitals reviewed.   Constitutional:       General: He is not in acute distress.  HENT:      Head: Normocephalic.      Nose: Nose normal.   Eyes:      Extraocular Movements: Extraocular movements intact.      Pupils: Pupils are equal, round, and reactive to light.   Cardiovascular:      Rate and Rhythm: Normal rate and regular rhythm.      Pulses: Normal pulses.      Heart sounds: Normal heart sounds. Heart sounds not distant. No murmur heard.     No friction rub. No gallop. No S3 or S4 sounds.   Pulmonary:      Effort: Pulmonary effort is normal.      Breath sounds: Normal breath sounds.   Abdominal:      General: Abdomen is flat. Bowel sounds are normal.      Palpations: Abdomen is soft.      Tenderness: There is no abdominal tenderness.   Skin:     General: Skin is warm and dry.   Neurological:      General: No focal deficit present.      Mental Status: He is alert and oriented to person, place, and time. Mental status is at baseline.   Psychiatric:         Mood and Affect: Mood normal.         Behavior: Behavior normal.         Lab Review:                Results from last 7 " days   Lab Units 02/28/25  1037   SODIUM mmol/L 137   POTASSIUM mmol/L 4.0   CHLORIDE mmol/L 99   CO2 mmol/L 28.8   BUN mg/dL 21   CREATININE mg/dL 1.18   GLUCOSE mg/dL 133*   CALCIUM mg/dL 9.3     Results from last 7 days   Lab Units 02/28/25  1150 02/28/25  1037   HSTROP T ng/L 59* 64*     Results from last 7 days   Lab Units 03/01/25  0335   WBC 10*3/mm3 6.10   HEMOGLOBIN g/dL 12.5*   HEMATOCRIT % 36.2*   PLATELETS 10*3/mm3 261     Results from last 7 days   Lab Units 02/28/25  1111   INR  1.10                   EKG:            Assessment/Plan:   Syncope  Clinical story is suspicious for orthostasis.  His orthostatic vitals here are unrevealing.  I agree with reducing his Flomax to nightly dosing.  Echocardiogram is reassuring, prosthetic aortic valve is functioning within normal limits.  No evidence of arrhythmia on telemetry since his admission.  Would recommend a Zio monitor at discharge.  Aortic valve insufficiency  Status post tissue AVR and left atrial appendage clip on 2/5/2025 with Dr. Martinez  Paroxysmal atrial fibrillation  Hypertension  Continue lisinopril  Hyperlipidemia  Continue atorvastatin  Postoperative anemia    Cardiology will follow, thank you for this consult.    Rose Douglass, APRN   03/01/25

## 2025-03-01 NOTE — PLAN OF CARE
Goal Outcome Evaluation:  Plan of Care Reviewed With: patient           Outcome Evaluation: VSS, no c/o pain. Pt using urinal at bedside--calls for assistance when needed. Pt appeared to sleep some between care. Echo still needed today. Will CTM, safety maintained.

## 2025-03-01 NOTE — INTERVAL H&P NOTE
H&P updated. The patient was examined and the following changes are noted:  patient admitted for syncopal episode.

## 2025-03-02 ENCOUNTER — APPOINTMENT (OUTPATIENT)
Dept: CARDIOLOGY | Facility: HOSPITAL | Age: 75
End: 2025-03-02
Payer: MEDICARE

## 2025-03-02 VITALS
RESPIRATION RATE: 16 BRPM | HEART RATE: 74 BPM | HEIGHT: 74 IN | BODY MASS INDEX: 24 KG/M2 | TEMPERATURE: 98.4 F | WEIGHT: 187 LBS | SYSTOLIC BLOOD PRESSURE: 145 MMHG | DIASTOLIC BLOOD PRESSURE: 91 MMHG | OXYGEN SATURATION: 98 %

## 2025-03-02 LAB
BASOPHILS # BLD AUTO: 0.04 10*3/MM3 (ref 0–0.2)
BASOPHILS NFR BLD AUTO: 0.5 % (ref 0–1.5)
DEPRECATED RDW RBC AUTO: 44.5 FL (ref 37–54)
EOSINOPHIL # BLD AUTO: 0.44 10*3/MM3 (ref 0–0.4)
EOSINOPHIL NFR BLD AUTO: 5.8 % (ref 0.3–6.2)
ERYTHROCYTE [DISTWIDTH] IN BLOOD BY AUTOMATED COUNT: 12.8 % (ref 12.3–15.4)
HCT VFR BLD AUTO: 36.2 % (ref 37.5–51)
HGB BLD-MCNC: 12.1 G/DL (ref 13–17.7)
IMM GRANULOCYTES # BLD AUTO: 0.03 10*3/MM3 (ref 0–0.05)
IMM GRANULOCYTES NFR BLD AUTO: 0.4 % (ref 0–0.5)
LYMPHOCYTES # BLD AUTO: 1.16 10*3/MM3 (ref 0.7–3.1)
LYMPHOCYTES NFR BLD AUTO: 15.4 % (ref 19.6–45.3)
MCH RBC QN AUTO: 31.5 PG (ref 26.6–33)
MCHC RBC AUTO-ENTMCNC: 33.4 G/DL (ref 31.5–35.7)
MCV RBC AUTO: 94.3 FL (ref 79–97)
MONOCYTES # BLD AUTO: 0.7 10*3/MM3 (ref 0.1–0.9)
MONOCYTES NFR BLD AUTO: 9.3 % (ref 5–12)
NEUTROPHILS NFR BLD AUTO: 5.17 10*3/MM3 (ref 1.7–7)
NEUTROPHILS NFR BLD AUTO: 68.6 % (ref 42.7–76)
NRBC BLD AUTO-RTO: 0 /100 WBC (ref 0–0.2)
PLATELET # BLD AUTO: 249 10*3/MM3 (ref 140–450)
PMV BLD AUTO: 9.2 FL (ref 6–12)
RBC # BLD AUTO: 3.84 10*6/MM3 (ref 4.14–5.8)
WBC NRBC COR # BLD AUTO: 7.54 10*3/MM3 (ref 3.4–10.8)

## 2025-03-02 PROCEDURE — 85025 COMPLETE CBC W/AUTO DIFF WBC: CPT | Performed by: THORACIC SURGERY (CARDIOTHORACIC VASCULAR SURGERY)

## 2025-03-02 PROCEDURE — 93246 EXT ECG>7D<15D RECORDING: CPT

## 2025-03-02 PROCEDURE — G0378 HOSPITAL OBSERVATION PER HR: HCPCS

## 2025-03-02 RX ORDER — TAMSULOSIN HYDROCHLORIDE 0.4 MG/1
0.4 CAPSULE ORAL NIGHTLY
Qty: 90 CAPSULE | Refills: 0 | Status: SHIPPED | OUTPATIENT
Start: 2025-03-02 | End: 2025-05-31

## 2025-03-02 RX ADMIN — LISINOPRIL 10 MG: 10 TABLET ORAL at 09:16

## 2025-03-02 RX ADMIN — ASPIRIN 81 MG: 81 TABLET, COATED ORAL at 09:16

## 2025-03-02 RX ADMIN — POTASSIUM CHLORIDE 20 MEQ: 1500 TABLET, EXTENDED RELEASE ORAL at 09:16

## 2025-03-02 NOTE — PLAN OF CARE
Goal Outcome Evaluation:              Outcome Evaluation: d/c home family to transport

## 2025-03-02 NOTE — DISCHARGE SUMMARY
Date of Admission:   Date of Discharge:  3/2/2025    Discharge Diagnosis:   -Syncope  -closed nasal fracture  -Aortic valve insuffiencey- AVR (tissue), left atrial appendage clip- Pagni 2/5/2025  -Atrial fibrillation on eliquis - last dose 1/29  -Hypertension  -Hyperlipidemia    Presenting Problem/History of Present Illness  Lightheadedness [R42]  Syncope [R55]  Closed fracture of nasal bone, initial encounter [S02.2XXA]  Injury of head, initial encounter [S09.90XA]  Facial injury, initial encounter [S09.93XA]     Hospital Course  Patient is a 75 y.o. male admitted for observation after syncopal episode after surgery. He has a closed fracture of his nasal bone but no acute findings or bleeding.  Cardiology consulted for evaluation.  Echo revealed a well functioning valve without evidence of any pericardial fluid.  Will stop his metoprolol, lasix and amiodarone, change his flomax to once daily at night and hold his eliquis.  Zio monitor has been ordered for 2 weeks. Follow up appointments as below.  Patient was provided with appropriate discharge education. He  was instructed to call office with any questions or concerns.      Procedures Performed         Consults:   Consults       Date and Time Order Name Status Description    3/1/2025  6:48 AM Inpatient Cardiology Consult Completed     2/5/2025  2:59 PM Inpatient Cardiology Consult Completed             Pertinent Test Results:    Lab Results   Component Value Date    WBC 7.54 03/02/2025    HGB 12.1 (L) 03/02/2025    HCT 36.2 (L) 03/02/2025    MCV 94.3 03/02/2025     03/02/2025      Lab Results   Component Value Date    GLUCOSE 133 (H) 02/28/2025    CALCIUM 9.3 02/28/2025     02/28/2025    K 4.0 02/28/2025    CO2 28.8 02/28/2025    CL 99 02/28/2025    BUN 21 02/28/2025    CREATININE 1.18 02/28/2025    EGFRIFAFRI 99 01/04/2022    EGFRIFNONA 86 01/04/2022    BCR 17.8 02/28/2025    ANIONGAP 9.2 02/28/2025     Lab Results   Component Value Date    INR 1.10  02/28/2025    PROTIME 12.4 02/28/2025         Condition on Discharge:  good    Vital Signs  Temp:  [97.9 °F (36.6 °C)-98.2 °F (36.8 °C)] 98.2 °F (36.8 °C)  Heart Rate:  [65-82] 76  Resp:  [16] 16  BP: (108-129)/(72-84) 127/73      Discharge Disposition  Home-Health Care c    Discharge Medications     Discharge Medications        Changes to Medications        Instructions Start Date   tamsulosin 0.4 MG capsule 24 hr capsule  Commonly known as: FLOMAX  What changed: when to take this   0.4 mg, Oral, Nightly             Continue These Medications        Instructions Start Date   acetaminophen 325 MG tablet  Commonly known as: TYLENOL   650 mg, Oral, Every 4 Hours PRN      Aspirin Low Dose 81 MG EC tablet  Generic drug: aspirin   81 mg, Oral, Daily      atorvastatin 40 MG tablet  Commonly known as: LIPITOR   40 mg, Oral, Nightly      lisinopril 10 MG tablet  Commonly known as: PRINIVIL,ZESTRIL   10 mg, Daily      traZODone 50 MG tablet  Commonly known as: DESYREL   TAKE ONE TABLET BY MOUTH ONCE NIGHTLY             Stop These Medications      amiodarone 200 MG tablet  Commonly known as: PACERONE     Eliquis 2.5 MG tablet tablet  Generic drug: apixaban     furosemide 40 MG tablet  Commonly known as: LASIX     metoprolol tartrate 50 MG tablet  Commonly known as: LOPRESSOR     potassium chloride 20 MEQ CR tablet  Commonly known as: KLOR-CON M20              Discharge Diet:     Activity at Discharge:     Follow-up Appointments  Future Appointments   Date Time Provider Department Center   3/3/2025  2:00 PM TELEMETRY MONITOR -  [facility]  [facility]   3/5/2025  2:00 PM TELEMETRY MONITOR -  [facility]  [facility]   3/7/2025  2:00 PM TELEMETRY MONITOR -  [facility]  [facility]   3/10/2025  2:00 PM TELEMETRY MONITOR -  [facility]   3/12/2025  2:00 PM TELEMETRY MONITOR -  [facility]  [facility]   3/14/2025  2:00 PM TELEMETRY MONITOR -  [facility]  [facility]   3/17/2025  2:00 PM TELEMETRY MONITOR -   [facility]  [facility]   3/18/2025  1:00 PM Leah Barker, APRN [facility]   3/19/2025  2:00 PM TELEMETRY MONITOR -  [facility]  [facility]   3/21/2025  2:00 PM TELEMETRY MONITOR -  [facility]  [facility]   3/24/2025  2:00 PM TELEMETRY MONITOR -  [facility]  [facility]   3/26/2025  2:00 PM TELEMETRY MONITOR -  [facility]  [facility]   3/28/2025  2:00 PM TELEMETRY MONITOR -  [facility]  [facility]   3/31/2025  2:00 PM TELEMETRY MONITOR -  [facility]  OJE[facility] CAR [facility]   4/2/2025  2:00 PM TELEMETRY MONITOR -  [facility]  [facility] CAR [facility]   4/4/2025  2:00 PM TELEMETRY MONITOR -  [facility]  [facility] CAR [facility]   4/7/2025  2:00 PM TELEMETRY MONITOR -  [facility]  [facility]   4/8/2025  8:30 AM Liss Yanez, APRN [facility] CD [facility]   4/9/2025  2:00 PM TELEMETRY MONITOR -  [facility]  [facility]   4/11/2025  2:00 PM TELEMETRY MONITOR -  [facility]  [facility]   4/14/2025  2:00 PM TELEMETRY MONITOR -  [facility]  [facility] CAR [facility]   4/16/2025  2:00 PM TELEMETRY MONITOR -  [facility]  [facility] CAR [facility]   4/18/2025  2:00 PM TELEMETRY MONITOR -  [facility] CARD  [facility] CAR [facility]   4/21/2025  2:00 PM TELEMETRY MONITOR -  [facility]  [facility]   4/23/2025  2:00 PM TELEMETRY MONITOR -  [facility] CARD  [facility] CAR [facility]   4/25/2025  2:00 PM TELEMETRY MONITOR -  [facility] CARD  JOE[facility] CAR [facility]   4/28/2025  2:00 PM TELEMETRY MONITOR -  [facility] CARD  JOE[facility] CAR JOE[facility]   4/30/2025  2:00 PM TELEMETRY MONITOR -  JOE[facility] CARD  JOE[facility] CAR JOE[facility]   5/2/2025  2:00 PM TELEMETRY MONITOR -  JOEY CARD  JOE[facility] CAR JOE[facility]   5/5/2025  2:00 PM TELEMETRY MONITOR -  JOEY CARD  JOEY CAR JOEY   5/7/2025  2:00 PM TELEMETRY MONITOR -  JOEY CARD  JOEY CAR JOEY   5/9/2025  2:00 PM TELEMETRY MONITOR -  JOEY CARD  JOEY CAR JOEY   5/12/2025  2:00 PM TELEMETRY MONITOR -  JOEY CARD  JOEY CAR JOEY   5/14/2025  2:00 PM TELEMETRY MONITOR -  JOE[facility] CARD  JOEY CAR JOE[facility]   5/16/2025  2:00 PM TELEMETRY MONITOR -  [facility] CARD  JOE[facility] CAR [facility]   5/19/2025 10:15 AM JOEY BRKG CT 1  JOEY CT  BR None   5/19/2025  2:00 PM TELEMETRY MONITOR - BH [facility] BH [facility] CAR [facility]   5/21/2025  2:00 PM TELEMETRY MONITOR - BH [facility] BH JOE[facility] CAR JOEY     Additional Instructions for the Follow-ups that You Need to Schedule       Call MD With Problems / Concerns   As directed      Instructions:  Call office at [phone] for any drainage, increased redness, or fever over 100.5    Order Comments: Instructions:  Call office at [phone] for any drainage, increased redness, or fever over 100.5         Discharge Follow-up with PCP   As directed       Currently Documented PCP:    Lizz Wiggins MD    PCP Phone Number:    [phone]     Follow Up Details: in 1 week        Discharge Follow-up with Specified Provider: Cardiologist; 1 Week   As directed      To: Cardiologist   Follow Up: 1 Week   Follow Up Details: call for appointment, bring all medication bottles to appointment                Test Results Pending at Discharge       KARI Levy  03/02/25  06:59 EST

## 2025-03-02 NOTE — CASE MANAGEMENT/SOCIAL WORK
Case Management Discharge Note      Final Note: dc home         Selected Continued Care - Discharged on 3/2/2025 Admission date: 2/28/2025 - Discharge disposition: Home-Health Care Svc      Destination    No services have been selected for the patient.                Durable Medical Equipment    No services have been selected for the patient.                Dialysis/Infusion    No services have been selected for the patient.                Home Medical Care    No services have been selected for the patient.                Therapy    No services have been selected for the patient.                Community Resources    No services have been selected for the patient.                Community & DME    No services have been selected for the patient.                    Selected Continued Care - Prior Encounters Includes continued care and service providers with selected services from prior encounters from 11/30/2024 to 3/2/2025      Discharged on 2/9/2025 Admission date: 2/5/2025 - Discharge disposition: Home-Health Care Svc      Durable Medical Equipment       Service Provider Services Address Phone Fax Patient Preferred    [facility] [address] [place] [facility] [address] [phone] --              Home Medical Care       Service Provider Services Address Phone Fax Patient Preferred    [facility] - [facility] [address] [place] [address] [phone] --                          Transportation Services  Private: Car    Final Discharge Disposition Code: 01 - home or self-care

## 2025-03-02 NOTE — PLAN OF CARE
Goal Outcome Evaluation:   Vital signs stable.  Room air all shift.  Up with standby assist in room.  Walked with assist/walker in hallway twice, around nurses station.  Orthostatic BP negative.    Estimated discharge date: tomorrow.

## 2025-03-02 NOTE — PLAN OF CARE
Goal Outcome Evaluation:  Plan of Care Reviewed With: patient           Outcome Evaluation: VSS, no c/o pain. Pt awake most of the night. Walked a couple of laps around the unit and in hallway once. Pt hopeful for D/C today. Will CTM, safety maintained.

## 2025-03-03 ENCOUNTER — APPOINTMENT (OUTPATIENT)
Dept: CARDIAC REHAB | Facility: HOSPITAL | Age: 75
End: 2025-03-03
Payer: MEDICARE

## 2025-03-03 ENCOUNTER — TELEPHONE (OUTPATIENT)
Dept: CARDIOLOGY | Facility: CLINIC | Age: 75
End: 2025-03-03
Payer: MEDICARE

## 2025-03-03 NOTE — TELEPHONE ENCOUNTER
I called patient unable to leave a voicemail because it is not set up.  I am okay with him continuing to drive.  He should not be taking Eliquis, furosemide, metoprolol, potassium or amiodarone at this time and tamsulosin was decreased to 1 tablet at night.  I will send him a [employer] message.

## 2025-03-03 NOTE — TELEPHONE ENCOUNTER
Patient called and stated that he was reviewing his after visit summary and noticed that it states that he shouldn't be driving but he has been driving and wants to know if he should be or not.     And also on his paperwork stated he should be stopping a medication and didn't specify which it was. Attempted to call and no answer. Left VM message.

## 2025-03-05 ENCOUNTER — TREATMENT (OUTPATIENT)
Dept: CARDIAC REHAB | Facility: HOSPITAL | Age: 75
End: 2025-03-05
Payer: MEDICARE

## 2025-03-05 DIAGNOSIS — Z95.2 S/P AVR (AORTIC VALVE REPLACEMENT): Primary | ICD-10-CM

## 2025-03-05 PROCEDURE — 93798 PHYS/QHP OP CAR RHAB W/ECG: CPT

## 2025-03-07 ENCOUNTER — APPOINTMENT (OUTPATIENT)
Dept: CARDIAC REHAB | Facility: HOSPITAL | Age: 75
End: 2025-03-07
Payer: MEDICARE

## 2025-03-10 ENCOUNTER — APPOINTMENT (OUTPATIENT)
Dept: CARDIAC REHAB | Facility: HOSPITAL | Age: 75
End: 2025-03-10
Payer: MEDICARE

## 2025-03-12 ENCOUNTER — APPOINTMENT (OUTPATIENT)
Dept: CARDIAC REHAB | Facility: HOSPITAL | Age: 75
End: 2025-03-12
Payer: MEDICARE

## 2025-03-14 ENCOUNTER — APPOINTMENT (OUTPATIENT)
Dept: CARDIAC REHAB | Facility: HOSPITAL | Age: 75
End: 2025-03-14
Payer: MEDICARE

## 2025-03-17 ENCOUNTER — TREATMENT (OUTPATIENT)
Dept: CARDIAC REHAB | Facility: HOSPITAL | Age: 75
End: 2025-03-17
Payer: MEDICARE

## 2025-03-17 DIAGNOSIS — Z95.2 S/P AVR (AORTIC VALVE REPLACEMENT): Primary | ICD-10-CM

## 2025-03-17 PROCEDURE — 93798 PHYS/QHP OP CAR RHAB W/ECG: CPT

## 2025-03-18 ENCOUNTER — TELEPHONE (OUTPATIENT)
Dept: CARDIAC SURGERY | Facility: CLINIC | Age: 75
End: 2025-03-18
Payer: MEDICARE

## 2025-03-18 ENCOUNTER — OFFICE VISIT (OUTPATIENT)
Dept: CARDIAC SURGERY | Facility: CLINIC | Age: 75
End: 2025-03-18
Payer: MEDICARE

## 2025-03-18 VITALS
SYSTOLIC BLOOD PRESSURE: 131 MMHG | OXYGEN SATURATION: 96 % | TEMPERATURE: 98 F | WEIGHT: 194 LBS | DIASTOLIC BLOOD PRESSURE: 88 MMHG | HEART RATE: 82 BPM | BODY MASS INDEX: 25.71 KG/M2 | RESPIRATION RATE: 18 BRPM | HEIGHT: 73 IN

## 2025-03-18 DIAGNOSIS — Z95.2 S/P AVR: Primary | ICD-10-CM

## 2025-03-18 PROCEDURE — 3079F DIAST BP 80-89 MM HG: CPT | Performed by: PHYSICIAN ASSISTANT

## 2025-03-18 PROCEDURE — 3075F SYST BP GE 130 - 139MM HG: CPT | Performed by: PHYSICIAN ASSISTANT

## 2025-03-18 PROCEDURE — 99024 POSTOP FOLLOW-UP VISIT: CPT | Performed by: PHYSICIAN ASSISTANT

## 2025-03-18 PROCEDURE — 1159F MED LIST DOCD IN RCRD: CPT | Performed by: PHYSICIAN ASSISTANT

## 2025-03-18 PROCEDURE — 1160F RVW MEDS BY RX/DR IN RCRD: CPT | Performed by: PHYSICIAN ASSISTANT

## 2025-03-18 NOTE — PROGRESS NOTES
CARDIOVASCULAR SURGERY FOLLOW-UP PROGRESS NOTE      Chief Complaint: Post-op follow-up appointment     HPI:     It was my pleasure to see your patient Roney Kuhn in the office today  As you know, he is a 75 y.o. male with aortic regurgitation who underwent AVR(29mm magna ease pericardial) and left atrial appendage closure (45mm AtriClip) by Dr. Martinez on 2/5/2025. He did well postoperatively and was discharged shortly after surgery. He comes in today with no new complaints.  His activity level has been  good and improving each day . He did come back to the hospital a few weeks ago after getting lightheaded and falling. PCP recommend he come to ER since he is on Eliquis. CT head showed no acute process but he did have a nasal fracture. His blood pressure and BPH meds were adjusted and his symptoms have improved. A holter monitor was placed which he is returning tomorrow for evaluation. He just returned from vacation where he had been walking several flights of stairs daily without issue. He was sent home on nocturnal supplemental oxygen but has not been using it for two weeks. He has a puls-ox at home to check his oxygen levels.      Medications:    Current Outpatient Medications:     aspirin 81 MG EC tablet, Take 1 tablet by mouth Daily for 90 days., Disp: 90 tablet, Rfl: 0    atorvastatin (LIPITOR) 40 MG tablet, Take 1 tablet by mouth Every Night for 90 days., Disp: 90 tablet, Rfl: 0    lisinopril (PRINIVIL,ZESTRIL) 10 MG tablet, Take 1 tablet by mouth Daily., Disp: , Rfl:     tamsulosin (FLOMAX) 0.4 MG capsule 24 hr capsule, Take 1 capsule by mouth Every Night for 90 days., Disp: 90 capsule, Rfl: 0    traZODone (DESYREL) 50 MG tablet, TAKE ONE TABLET BY MOUTH ONCE NIGHTLY (Patient taking differently: Take 1 tablet by mouth Every Night.), Disp: 90 tablet, Rfl: 1  No current facility-administered medications for this visit.     Physical Exam:         /88 (BP Location: Left arm, Patient Position:  "Sitting, Cuff Size: Adult)   Pulse 82   Temp 98 °F (36.7 °C)   Resp 18   Ht 185.4 cm (73\")   Wt 88 kg (194 lb)   SpO2 96%   BMI 25.60 kg/m²   Heart:  regular rate and rhythm, no murmurs  Lungs:  clear to auscultation bilaterally  Extremities:  no edema, warm and well perfused  Incision(s):  sternal incision healing well without redness or drainage; Sternum stable to palpation.    Assessment/Plan:     S/P AVR. Overall, he is doing well.  Office will send d/c order to [facility] for nocturnal oxygen.    No significant post-op complications  Keep incisions clean and dry  Continue sternal precautions including heavy lifting greater than 25lbs till 12 weeks after surgery.   Stressed the importance of prophylactic antibiotics with any dental procedure due to his prosthetic heart valve.     OK to drive if not taking narcotic pain medicine  OK to begin cardiac rehab  Follow-up as scheduled with cardiology  Follow-up as scheduled with PCP    Follow-up with our office PRN    Thank you for allowing me to participate in the care of your patient. If you have any questions or concerns feel free to contact myself or Dr. Martinez.    Regards,  Tre Goodwin PA-C      "

## 2025-03-18 NOTE — TELEPHONE ENCOUNTER
"    Caller: Roney Kuhn \"MARIANELA\"    Relationship: Self    Best call back number: [phone]    What orders are you requesting (i.e. lab or imaging): EQUIPMENT     Additional notes: PT NEED ROTECH TO  OXYGEN. PLEASE FAX ORDER .[phone]. THANK YOU            "

## 2025-03-19 ENCOUNTER — TREATMENT (OUTPATIENT)
Dept: CARDIAC REHAB | Facility: HOSPITAL | Age: 75
End: 2025-03-19
Payer: MEDICARE

## 2025-03-19 DIAGNOSIS — Z95.2 S/P AVR (AORTIC VALVE REPLACEMENT): Primary | ICD-10-CM

## 2025-03-19 PROCEDURE — 93798 PHYS/QHP OP CAR RHAB W/ECG: CPT

## 2025-03-21 ENCOUNTER — TREATMENT (OUTPATIENT)
Dept: CARDIAC REHAB | Facility: HOSPITAL | Age: 75
End: 2025-03-21
Payer: MEDICARE

## 2025-03-21 DIAGNOSIS — Z95.2 S/P AVR (AORTIC VALVE REPLACEMENT): Primary | ICD-10-CM

## 2025-03-21 PROCEDURE — 93798 PHYS/QHP OP CAR RHAB W/ECG: CPT

## 2025-03-21 NOTE — TELEPHONE ENCOUNTER
Spoke to pt and informed him that orders were faxed earlier this week and we aren't responsible for the  of the equipment. Pt verbalized understand.

## 2025-03-24 ENCOUNTER — TREATMENT (OUTPATIENT)
Dept: CARDIAC REHAB | Facility: HOSPITAL | Age: 75
End: 2025-03-24
Payer: MEDICARE

## 2025-03-24 DIAGNOSIS — Z95.2 S/P AVR (AORTIC VALVE REPLACEMENT): Primary | ICD-10-CM

## 2025-03-24 LAB
CV ZIO AF AVG BPM: 101 BPM
CV ZIO AF BPM HIGH: 172 BPM
CV ZIO AF BPM LOW: 65 BPM
CV ZIO AF F EPI AVG BPM: 103 BPM
CV ZIO AF F EPI BPM HIGH: 172 BPM
CV ZIO AF F EPI BPM LOW: 65 BPM
CV ZIO AF F EPI DT: NORMAL
CV ZIO AF L EPI AVG BPM: 103 BPM
CV ZIO AF L EPI BPM HIGH: 172 BPM
CV ZIO AF L EPI BPM LOW: 65 BPM
CV ZIO AF L EPI DUR: NORMAL SEC
CV ZIO AF L EPI END: NORMAL
CV ZIO AF L EPI START: NORMAL
CV ZIO AF PERCENT: 8 %
CV ZIO AF S EPI AVG BPM: 93 BPM
CV ZIO AF S EPI BPM HIGH: 150 BPM
CV ZIO AF S EPI BPM LOW: 65 BPM
CV ZIO AF S EPI DT: NORMAL
CV ZIO BASELINE AVG BPM: 89 BPM
CV ZIO BASELINE BPM HIGH: 172 BPM
CV ZIO BASELINE BPM LOW: 56 BPM
CV ZIO DEVICE ANALYSIS TIME: NORMAL
CV ZIO ECT SVE COUNT: NORMAL EPISODES
CV ZIO ECT SVE CPLT COUNT: 308 EPISODES
CV ZIO ECT SVE CPLT FREQ: NORMAL
CV ZIO ECT SVE FREQ: NORMAL
CV ZIO ECT SVE TPLT COUNT: 50 EPISODES
CV ZIO ECT SVE TPLT FREQ: NORMAL
CV ZIO ECT VE COUNT: 3398 EPISODES
CV ZIO ECT VE CPLT COUNT: 198 EPISODES
CV ZIO ECT VE CPLT FREQ: NORMAL
CV ZIO ECT VE FREQ: NORMAL
CV ZIO ECT VE TPLT COUNT: 0 EPISODES
CV ZIO ECT VE TPLT FREQ: 0
CV ZIO ECTOPIC SVE COUPLET RAW PERCENT: 0.03 %
CV ZIO ECTOPIC SVE ISOLATED PERCENT: 1.32 %
CV ZIO ECTOPIC SVE TRIPLET RAW PERCENT: 0.01 %
CV ZIO ECTOPIC VE COUPLET RAW PERCENT: 0.02 %
CV ZIO ECTOPIC VE ISOLATED PERCENT: 0.19 %
CV ZIO ECTOPIC VE TRIPLET RAW PERCENT: 0 %
CV ZIO ENROLLMENT END: NORMAL
CV ZIO ENROLLMENT START: NORMAL
CV ZIO IRREG TYPE: NORMAL
CV ZIO PATIENT EVENTS DIARIES: 0
CV ZIO PATIENT EVENTS TRIGGERS: 0
CV ZIO PAUSE COUNT: 0
CV ZIO PRESCRIPTION STATUS: NORMAL
CV ZIO SVT COUNT: 0
CV ZIO TOTAL  ENROLLMENT PERIOD: NORMAL
CV ZIO VT AVG BPM: 153 BPM
CV ZIO VT BPM HIGH: 158 BPM
CV ZIO VT BPM LOW: 146 BPM
CV ZIO VT COUNT: 1
CV ZIO VT F EPI AVG BPM: 153
CV ZIO VT F EPI BEATS: 4 BEATS
CV ZIO VT F EPI BPM HIGH: 158
CV ZIO VT F EPI BPM LOW: 146
CV ZIO VT F EPI DUR: 1.8 SEC
CV ZIO VT F EPI END: NORMAL
CV ZIO VT F EPI START: NORMAL
CV ZIO VT L EPI AVG BPM: 153
CV ZIO VT L EPI BEATS: 4 BEATS
CV ZIO VT L EPI BPM HIGH: 158 BPM
CV ZIO VT L EPI BPM LOW: 146 BPM
CV ZIO VT L EPI DUR: 1.8
CV ZIO VT L EPI END: NORMAL
CV ZIO VT L EPI START: NORMAL

## 2025-03-24 PROCEDURE — 93798 PHYS/QHP OP CAR RHAB W/ECG: CPT

## 2025-03-26 ENCOUNTER — TREATMENT (OUTPATIENT)
Dept: CARDIAC REHAB | Facility: HOSPITAL | Age: 75
End: 2025-03-26
Payer: MEDICARE

## 2025-03-26 DIAGNOSIS — Z95.2 S/P AVR (AORTIC VALVE REPLACEMENT): Primary | ICD-10-CM

## 2025-03-26 PROCEDURE — 93798 PHYS/QHP OP CAR RHAB W/ECG: CPT

## 2025-03-28 ENCOUNTER — TREATMENT (OUTPATIENT)
Dept: CARDIAC REHAB | Facility: HOSPITAL | Age: 75
End: 2025-03-28
Payer: MEDICARE

## 2025-03-28 DIAGNOSIS — Z95.2 S/P AVR (AORTIC VALVE REPLACEMENT): Primary | ICD-10-CM

## 2025-03-28 PROCEDURE — 93798 PHYS/QHP OP CAR RHAB W/ECG: CPT

## 2025-03-31 ENCOUNTER — TREATMENT (OUTPATIENT)
Dept: CARDIAC REHAB | Facility: HOSPITAL | Age: 75
End: 2025-03-31
Payer: MEDICARE

## 2025-03-31 DIAGNOSIS — Z95.2 S/P AVR (AORTIC VALVE REPLACEMENT): Primary | ICD-10-CM

## 2025-03-31 PROCEDURE — 93798 PHYS/QHP OP CAR RHAB W/ECG: CPT

## 2025-04-02 ENCOUNTER — TREATMENT (OUTPATIENT)
Dept: CARDIAC REHAB | Facility: HOSPITAL | Age: 75
End: 2025-04-02
Payer: MEDICARE

## 2025-04-02 DIAGNOSIS — Z95.2 S/P AVR (AORTIC VALVE REPLACEMENT): Primary | ICD-10-CM

## 2025-04-02 PROCEDURE — 93798 PHYS/QHP OP CAR RHAB W/ECG: CPT

## 2025-04-04 ENCOUNTER — TREATMENT (OUTPATIENT)
Dept: CARDIAC REHAB | Facility: HOSPITAL | Age: 75
End: 2025-04-04
Payer: MEDICARE

## 2025-04-04 DIAGNOSIS — Z95.2 S/P AVR (AORTIC VALVE REPLACEMENT): Primary | ICD-10-CM

## 2025-04-04 PROCEDURE — 93798 PHYS/QHP OP CAR RHAB W/ECG: CPT

## 2025-04-07 ENCOUNTER — TREATMENT (OUTPATIENT)
Dept: CARDIAC REHAB | Facility: HOSPITAL | Age: 75
End: 2025-04-07
Payer: MEDICARE

## 2025-04-07 DIAGNOSIS — Z95.2 S/P AVR (AORTIC VALVE REPLACEMENT): Primary | ICD-10-CM

## 2025-04-07 PROCEDURE — 93798 PHYS/QHP OP CAR RHAB W/ECG: CPT

## 2025-04-08 ENCOUNTER — OFFICE VISIT (OUTPATIENT)
Dept: CARDIOLOGY | Age: 75
End: 2025-04-08
Payer: MEDICARE

## 2025-04-08 VITALS
SYSTOLIC BLOOD PRESSURE: 138 MMHG | DIASTOLIC BLOOD PRESSURE: 90 MMHG | OXYGEN SATURATION: 94 % | BODY MASS INDEX: 25.58 KG/M2 | WEIGHT: 193 LBS | HEART RATE: 78 BPM | HEIGHT: 73 IN

## 2025-04-08 DIAGNOSIS — Z95.2 S/P AVR (AORTIC VALVE REPLACEMENT): Primary | ICD-10-CM

## 2025-04-08 DIAGNOSIS — I10 PRIMARY HYPERTENSION: ICD-10-CM

## 2025-04-08 DIAGNOSIS — I48.0 PAF (PAROXYSMAL ATRIAL FIBRILLATION): ICD-10-CM

## 2025-04-08 RX ORDER — ATORVASTATIN CALCIUM 40 MG/1
40 TABLET, FILM COATED ORAL NIGHTLY
Qty: 90 TABLET | Refills: 3 | Status: SHIPPED | OUTPATIENT
Start: 2025-04-08

## 2025-04-08 RX ORDER — AMOXICILLIN 500 MG/1
CAPSULE ORAL
Qty: 12 CAPSULE | Refills: 0 | Status: SHIPPED | OUTPATIENT
Start: 2025-04-08

## 2025-04-08 RX ORDER — TAMSULOSIN HYDROCHLORIDE 0.4 MG/1
0.4 CAPSULE ORAL NIGHTLY
Start: 2025-04-08

## 2025-04-08 NOTE — PROGRESS NOTES
Date of Office Visit: 25  Encounter Provider: KARI Lockhart  Place of Service: [facility] CARDIOLOGY  Patient Name: Rnoey Kuhn  :1950    Chief Complaint   Patient presents with    Persistent atrial fibrillation    S/P AVR (aortic valve replacement)    Follow-up   :     HPI: Roney Kuhn is a 75 y.o. male  with  atrial fibrillation, severe aortic valve insufficiency status post AVR, hypertension, hyperlipidemia, cardiomyopathy, aortic root dilation, right lung nodule, and BPH.         He is followed by Dr. Tammy Mi.  I will visit with him in follow-up and have reviewed his medical record.     In April he was on vacation in [facility] and had an aggressive cough.  He went to urgent care and ultimately was admitted to the nearest hospital.  He was treated for pneumonia and told he had A-fib.  He had an echocardiogram which showed ejection fraction of 45-50% and dilated left ventricle, aortic root measuring 4.3 cm, mild mitral valve regurgitation, mild to moderate tricuspid valve regurgitation, mildly dilated left atrium, mildly elevated pulmonary pressure 35 mmHg,  In May 2024 patient had successful external cardioversion.  He then maintained normal sinus rhythm.  He had transesophageal echocardiogram 2024 which showed dilated LV cavity with an EF of 56-60% and severe trileaflet aortic valve regurgitation with poor coaptation between the right and left coronary cusp.  He then was sent to Dr. Martinez and surgical intervention was recommended.  He had cardiac catheterization 2024 which showed 10-20% coronary artery disease.  He had preoperative PFT 2024.      2025-he ultimately had elective AVR with a 29 mm magna ease pericardial prosthesis,   Left atrial appendage epicardial closure with a 45 mm atrial clip device on 25 by Dr. Martinez.  He had postop A-fib and was placed on amiodarone.        He then had a syncopal episode  "on 2/28/2025.  Echocardiogram showed preserved LV systolic function, bioprosthetic aortic valve measurements within normal limits.  At discharge his furosemide, metoprolol, amiodarone and apixaban were stopped.  2-week heart monitor showed A-fib 8%.    He presents today for reassessment.  He has had much improved dizziness when he first stands up.  No chest pain or shortness of breath or edema or palpitation.  He continues in cardiac rehab 3 times a week and also is walking on other days.      No Known Allergies        Family and social history reviewed.     ROS  All other systems were reviewed and are negative          Objective:     Vitals:    04/08/25 0835   BP: 138/90   BP Location: Left arm   Patient Position: Sitting   Cuff Size: Adult   Pulse: 78   SpO2: 94%   Weight: 87.5 kg (193 lb)   Height: 185.4 cm (73\")     Body mass index is 25.46 kg/m².    PHYSICAL EXAM:  Pulmonary:      Effort: Pulmonary effort is normal.      Breath sounds: Normal breath sounds.   Cardiovascular:      Normal rate. Regular rhythm.         Procedures      Current Outpatient Medications   Medication Sig Dispense Refill    aspirin 81 MG EC tablet Take 1 tablet by mouth Daily for 90 days. 90 tablet 0    atorvastatin (LIPITOR) 40 MG tablet Take 1 tablet by mouth Every Night. 90 tablet 3    lisinopril (PRINIVIL,ZESTRIL) 10 MG tablet Take 1 tablet by mouth Daily.      tamsulosin (FLOMAX) 0.4 MG capsule 24 hr capsule Take 1 capsule by mouth Every Night.      traZODone (DESYREL) 50 MG tablet TAKE ONE TABLET BY MOUTH ONCE NIGHTLY (Patient taking differently: Take 1 tablet by mouth Every Night.) 90 tablet 1     No current facility-administered medications for this visit.     Assessment:       Diagnosis Plan   1. S/P AVR (aortic valve replacement)        2. PAF (paroxysmal atrial fibrillation)        3. Primary hypertension             No orders of the defined types were placed in this encounter.        Plan:       1.  75-year-old gentleman with " atrial fibrillation status post cardioversion May 2024.  He had postop A-fib 2/2025.  He has history of left atrial appendage epicardial closure.  He is on aspirin 81 mg daily.  He had 8% A-fib on heart monitor 1 month ago.    2.  Severe to valve regurgitation on MICHELLE September 2024 with mildly dilated aorta at 4.3 cm.  Now status post elective AVR with a 29 mm magna ease pericardial prosthesis 02/5/2025,   -Baseline echo 3/1/2025 showed normal gradients  -He continues to cardiac rehab.  We discussed that he may stop rehab early and continue exercising on his own but he plans to continue at least another 2 to 3 weeks in rehab  -Amoxicillin for all dental procedures ordered  3.  Left atrial appendage epicardial closure with a 45 mm atrial clip device on 2/5/25 by Dr. Martinez.  -He is not on Eliquis at this time   4.Hyperlipidemia on atorvastatin 40 mg daily and we discussed continuing this dose  5. Hypertension-blood pressure stable and better at home.  Continue off metoprolol and continue lisinopril  6.nonobstructive, minimal coronary artery disease on cardiac catheterization.-Continue aspirin and atorvastatin 40 mg     7..BPH on tamsulosin-he is following closely with urology and reports that he will need a bladder procedure  8.  Tricuspid valve insufficiency- trace on MICHELLE 09/2024  9.  Aortic Root dilation at 4.3 cm on CTA September 2024  10. 9-10 mm right lower lobe nodule September 2024 and stable on repeat November 18, 2024.   Next CT is scheduled 05/2025  11.  Syncope 2/28/2025 felt to be related to orthostatic hypotension and metoprolol and furosemide were stopped at that time.  He was also taken off Eliquis    Follow-up in 3 months.            It has been a pleasure to participate in this patient's care.      Thank you,  KARI Lockhart      **I used Dragon to dictate this note:**

## 2025-04-09 ENCOUNTER — TREATMENT (OUTPATIENT)
Dept: CARDIAC REHAB | Facility: HOSPITAL | Age: 75
End: 2025-04-09
Payer: MEDICARE

## 2025-04-09 DIAGNOSIS — Z95.2 S/P AVR (AORTIC VALVE REPLACEMENT): Primary | ICD-10-CM

## 2025-04-09 PROCEDURE — 93798 PHYS/QHP OP CAR RHAB W/ECG: CPT

## 2025-04-11 ENCOUNTER — TREATMENT (OUTPATIENT)
Dept: CARDIAC REHAB | Facility: HOSPITAL | Age: 75
End: 2025-04-11
Payer: MEDICARE

## 2025-04-11 DIAGNOSIS — Z95.2 S/P AVR (AORTIC VALVE REPLACEMENT): Primary | ICD-10-CM

## 2025-04-11 PROCEDURE — 93798 PHYS/QHP OP CAR RHAB W/ECG: CPT

## 2025-04-14 ENCOUNTER — TREATMENT (OUTPATIENT)
Dept: CARDIAC REHAB | Facility: HOSPITAL | Age: 75
End: 2025-04-14
Payer: MEDICARE

## 2025-04-14 DIAGNOSIS — Z95.2 S/P AVR (AORTIC VALVE REPLACEMENT): Primary | ICD-10-CM

## 2025-04-14 PROCEDURE — 93798 PHYS/QHP OP CAR RHAB W/ECG: CPT

## 2025-04-16 ENCOUNTER — TREATMENT (OUTPATIENT)
Dept: CARDIAC REHAB | Facility: HOSPITAL | Age: 75
End: 2025-04-16
Payer: MEDICARE

## 2025-04-16 DIAGNOSIS — Z95.2 S/P AVR (AORTIC VALVE REPLACEMENT): Primary | ICD-10-CM

## 2025-04-16 PROCEDURE — 93798 PHYS/QHP OP CAR RHAB W/ECG: CPT

## 2025-04-18 ENCOUNTER — APPOINTMENT (OUTPATIENT)
Dept: CARDIAC REHAB | Facility: HOSPITAL | Age: 75
End: 2025-04-18
Payer: MEDICARE

## 2025-04-21 ENCOUNTER — TREATMENT (OUTPATIENT)
Dept: CARDIAC REHAB | Facility: HOSPITAL | Age: 75
End: 2025-04-21
Payer: MEDICARE

## 2025-04-21 DIAGNOSIS — Z95.2 S/P AVR (AORTIC VALVE REPLACEMENT): Primary | ICD-10-CM

## 2025-04-21 PROCEDURE — 93798 PHYS/QHP OP CAR RHAB W/ECG: CPT

## 2025-04-23 ENCOUNTER — TREATMENT (OUTPATIENT)
Dept: CARDIAC REHAB | Facility: HOSPITAL | Age: 75
End: 2025-04-23
Payer: MEDICARE

## 2025-04-23 DIAGNOSIS — Z95.2 S/P AVR (AORTIC VALVE REPLACEMENT): Primary | ICD-10-CM

## 2025-04-23 PROCEDURE — 93798 PHYS/QHP OP CAR RHAB W/ECG: CPT

## 2025-04-25 ENCOUNTER — TREATMENT (OUTPATIENT)
Dept: CARDIAC REHAB | Facility: HOSPITAL | Age: 75
End: 2025-04-25
Payer: MEDICARE

## 2025-04-25 DIAGNOSIS — Z95.2 S/P AVR (AORTIC VALVE REPLACEMENT): Primary | ICD-10-CM

## 2025-04-25 PROCEDURE — 93798 PHYS/QHP OP CAR RHAB W/ECG: CPT

## 2025-04-25 NOTE — PROGRESS NOTES
" LOS: 0 days   Patient Care Team:  Lizz Wiggins MD as PCP - General (Family Medicine)    Chief Complaint: syncope    Subjective:  Symptoms:  No shortness of breath, chest pain or chest pressure.    Diet:  No nausea or vomiting.          Vital Signs  Temp:  [97.3 °F (36.3 °C)-98.9 °F (37.2 °C)] 97.9 °F (36.6 °C)  Heart Rate:  [60-74] 71  Resp:  [16-18] 16  BP: (101-132)/(58-83) 112/74  Body mass index is 24.06 kg/m².    Intake/Output Summary (Last 24 hours) at 3/1/2025 1001  Last data filed at 3/1/2025 0632  Gross per 24 hour   Intake 240 ml   Output 800 ml   Net -560 ml     No intake/output data recorded.             02/28/25  1030 02/28/25  1700   Weight: 86.2 kg (190 lb) 85 kg (187 lb 6.4 oz)         Objective:  Vital signs: (most recent): Blood pressure 118/84, pulse 82, temperature 97.9 °F (36.6 °C), temperature source Oral, resp. rate 16, height 188 cm (74\"), weight 85 kg (187 lb 6.4 oz), SpO2 97%.                 Results Review:        WBC WBC   Date Value Ref Range Status   03/01/2025 6.10 3.40 - 10.80 10*3/mm3 Final   02/28/2025 7.38 3.40 - 10.80 10*3/mm3 Final      HGB Hemoglobin   Date Value Ref Range Status   03/01/2025 12.5 (L) 13.0 - 17.7 g/dL Final   02/28/2025 12.4 (L) 13.0 - 17.7 g/dL Final      HCT Hematocrit   Date Value Ref Range Status   03/01/2025 36.2 (L) 37.5 - 51.0 % Final   02/28/2025 38.0 37.5 - 51.0 % Final      Platelets Platelets   Date Value Ref Range Status   03/01/2025 261 140 - 450 10*3/mm3 Final   02/28/2025 292 140 - 450 10*3/mm3 Final        PT/INR:    Protime   Date Value Ref Range Status   02/28/2025 12.4 11.0 - 15.0 Seconds Final     Comment:     Serial Number: [id number]:  [id number]   /  INR   Date Value Ref Range Status   02/28/2025 1.10  Final       Sodium Sodium   Date Value Ref Range Status   02/28/2025 137 136 - 145 mmol/L Final      Potassium Potassium   Date Value Ref Range Status   02/28/2025 4.0 3.5 - 5.2 mmol/L Final      Chloride Chloride   Date Value " "Ref Range Status   02/28/2025 99 98 - 107 mmol/L Final      Bicarbonate CO2   Date Value Ref Range Status   02/28/2025 28.8 22.0 - 29.0 mmol/L Final      BUN BUN   Date Value Ref Range Status   02/28/2025 21 8 - 23 mg/dL Final      Creatinine Creatinine   Date Value Ref Range Status   02/28/2025 1.18 0.76 - 1.27 mg/dL Final      Calcium Calcium   Date Value Ref Range Status   02/28/2025 9.3 8.6 - 10.5 mg/dL Final      Magnesium No results found for: \"MG\"       aspirin, 81 mg, Oral, Daily  atorvastatin, 40 mg, Oral, Nightly  lisinopril, 10 mg, Oral, Q24H  potassium chloride, 20 mEq, Oral, Daily  tamsulosin, 0.4 mg, Oral, BID                 Syncope      Assessment & Plan    -Syncope  -Aortic valve insuffiencey- AVR (tissue), left atrial appendage clip- Pagni 2/5/2025  -Atrial fibrillation on eliquis - last dose 1/29  -Hypertension  -Hyperlipidemia  -Post op anemia- expected acute blood loss     Episode of dizziness and syncope.  Admitted for further work up.  Will ask cardiology to evaluate.    Echo ordered. Orthostatics ordered.  Will adjust flomax to only at night.      Leah Eason, KARI  03/01/25  10:01 EST    " no

## 2025-04-28 ENCOUNTER — TREATMENT (OUTPATIENT)
Dept: CARDIAC REHAB | Facility: HOSPITAL | Age: 75
End: 2025-04-28
Payer: MEDICARE

## 2025-04-28 DIAGNOSIS — Z95.2 S/P AVR (AORTIC VALVE REPLACEMENT): Primary | ICD-10-CM

## 2025-04-28 PROCEDURE — 93798 PHYS/QHP OP CAR RHAB W/ECG: CPT

## 2025-04-30 ENCOUNTER — TREATMENT (OUTPATIENT)
Dept: CARDIAC REHAB | Facility: HOSPITAL | Age: 75
End: 2025-04-30
Payer: MEDICARE

## 2025-04-30 DIAGNOSIS — Z95.2 S/P AVR (AORTIC VALVE REPLACEMENT): Primary | ICD-10-CM

## 2025-04-30 PROCEDURE — 93798 PHYS/QHP OP CAR RHAB W/ECG: CPT

## 2025-05-02 ENCOUNTER — TREATMENT (OUTPATIENT)
Dept: CARDIAC REHAB | Facility: HOSPITAL | Age: 75
End: 2025-05-02
Payer: MEDICARE

## 2025-05-02 DIAGNOSIS — Z95.2 S/P AVR (AORTIC VALVE REPLACEMENT): Primary | ICD-10-CM

## 2025-05-02 PROCEDURE — 93798 PHYS/QHP OP CAR RHAB W/ECG: CPT

## 2025-05-05 ENCOUNTER — APPOINTMENT (OUTPATIENT)
Dept: CARDIAC REHAB | Facility: HOSPITAL | Age: 75
End: 2025-05-05
Payer: MEDICARE

## 2025-05-07 ENCOUNTER — TREATMENT (OUTPATIENT)
Dept: CARDIAC REHAB | Facility: HOSPITAL | Age: 75
End: 2025-05-07
Payer: MEDICARE

## 2025-05-07 DIAGNOSIS — Z95.2 S/P AVR (AORTIC VALVE REPLACEMENT): Primary | ICD-10-CM

## 2025-05-07 PROCEDURE — 93798 PHYS/QHP OP CAR RHAB W/ECG: CPT

## 2025-05-09 ENCOUNTER — TREATMENT (OUTPATIENT)
Dept: CARDIAC REHAB | Facility: HOSPITAL | Age: 75
End: 2025-05-09
Payer: MEDICARE

## 2025-05-09 DIAGNOSIS — Z95.2 S/P AVR (AORTIC VALVE REPLACEMENT): Primary | ICD-10-CM

## 2025-05-09 PROCEDURE — 93798 PHYS/QHP OP CAR RHAB W/ECG: CPT

## 2025-05-12 ENCOUNTER — TREATMENT (OUTPATIENT)
Dept: CARDIAC REHAB | Facility: HOSPITAL | Age: 75
End: 2025-05-12
Payer: MEDICARE

## 2025-05-12 DIAGNOSIS — Z95.2 S/P AVR (AORTIC VALVE REPLACEMENT): Primary | ICD-10-CM

## 2025-05-12 PROCEDURE — 93798 PHYS/QHP OP CAR RHAB W/ECG: CPT

## 2025-05-14 ENCOUNTER — TREATMENT (OUTPATIENT)
Dept: CARDIAC REHAB | Facility: HOSPITAL | Age: 75
End: 2025-05-14
Payer: MEDICARE

## 2025-05-14 DIAGNOSIS — Z95.2 S/P AVR (AORTIC VALVE REPLACEMENT): Primary | ICD-10-CM

## 2025-05-14 PROCEDURE — 93798 PHYS/QHP OP CAR RHAB W/ECG: CPT

## 2025-05-16 ENCOUNTER — APPOINTMENT (OUTPATIENT)
Dept: CARDIAC REHAB | Facility: HOSPITAL | Age: 75
End: 2025-05-16
Payer: MEDICARE

## 2025-05-19 ENCOUNTER — HOSPITAL ENCOUNTER (OUTPATIENT)
Facility: HOSPITAL | Age: 75
Discharge: HOME | End: 2025-05-19
Admitting: NURSE PRACTITIONER
Payer: MEDICARE

## 2025-05-19 ENCOUNTER — APPOINTMENT (OUTPATIENT)
Dept: CARDIAC REHAB | Facility: HOSPITAL | Age: 75
End: 2025-05-19
Payer: MEDICARE

## 2025-05-19 DIAGNOSIS — R91.1 NODULE OF LOWER LOBE OF RIGHT LUNG: ICD-10-CM

## 2025-05-19 PROCEDURE — 71250 CT THORAX DX C-: CPT

## 2025-05-21 ENCOUNTER — APPOINTMENT (OUTPATIENT)
Dept: CARDIAC REHAB | Facility: HOSPITAL | Age: 75
End: 2025-05-21
Payer: MEDICARE

## 2025-05-27 ENCOUNTER — RESULTS FOLLOW-UP (OUTPATIENT)
Dept: CARDIOLOGY | Age: 75
End: 2025-05-27
Payer: MEDICARE

## 2025-05-27 NOTE — TELEPHONE ENCOUNTER
Please inform patient his previously identified right lung nodule is no longer seen.  No other significant or concerning findings on follow-up CT chest

## 2025-07-08 ENCOUNTER — OFFICE VISIT (OUTPATIENT)
Dept: CARDIOLOGY | Age: 75
End: 2025-07-08
Payer: MEDICARE

## 2025-07-08 VITALS
DIASTOLIC BLOOD PRESSURE: 78 MMHG | BODY MASS INDEX: 25.18 KG/M2 | HEART RATE: 81 BPM | HEIGHT: 73 IN | OXYGEN SATURATION: 95 % | WEIGHT: 190 LBS | SYSTOLIC BLOOD PRESSURE: 118 MMHG

## 2025-07-08 DIAGNOSIS — I48.0 PAF (PAROXYSMAL ATRIAL FIBRILLATION): ICD-10-CM

## 2025-07-08 DIAGNOSIS — I10 PRIMARY HYPERTENSION: ICD-10-CM

## 2025-07-08 DIAGNOSIS — Z95.2 S/P AVR (AORTIC VALVE REPLACEMENT): Primary | ICD-10-CM

## 2025-07-08 DIAGNOSIS — M79.89 RIGHT LEG SWELLING: ICD-10-CM

## 2025-07-08 RX ORDER — LISINOPRIL 5 MG/1
10 TABLET ORAL DAILY
Qty: 90 TABLET | Refills: 3 | Status: SHIPPED | OUTPATIENT
Start: 2025-07-08

## 2025-07-08 RX ORDER — ASPIRIN 81 MG/1
81 TABLET ORAL DAILY
COMMUNITY

## 2025-07-08 NOTE — PROGRESS NOTES
Date of Office Visit: 25  Encounter Provider: KARI Lockhart  Place of Service: [facility]  Patient Name: Roney Kuhn  :1950    Chief Complaint   Patient presents with    S/P AVR (aortic valve replacement)    Nonrheumatic aortic valve insufficiency    Follow-up   :     HPI: Roney Kuhn is a 75 y.o. male  with atrial fibrillation, severe aortic valve insufficiency status post AVR, hypertension, hyperlipidemia, cardiomyopathy, aortic root dilation, and BPH.         He is followed by Dr. Tammy Mi.  I will visit with him in follow-up and have reviewed his medical record.     In April he was on vacation in [facility] and had an aggressive cough.  He went to urgent care and ultimately was admitted to the nearest hospital.  He was treated for pneumonia and told he had A-fib.  He had an echocardiogram which showed ejection fraction of 45-50% and dilated left ventricle, aortic root measuring 4.3 cm, mild mitral valve regurgitation, mild to moderate tricuspid valve regurgitation, mildly dilated left atrium, mildly elevated pulmonary pressure 35 mmHg,  In May 2024 patient had successful external cardioversion.  He then maintained normal sinus rhythm.  He had transesophageal echocardiogram 2024 which showed dilated LV cavity with an EF of 56-60% and severe trileaflet aortic valve regurgitation with poor coaptation between the right and left coronary cusp.  He then was sent to Dr. Martinez and surgical intervention was recommended.  He had cardiac catheterization 2024 which showed 10-20% coronary artery disease.  He had preoperative PFT 2024.      2025-he ultimately had elective AVR with a 29 mm magna ease pericardial prosthesis,   Left atrial appendage epicardial closure with a 45 mm atrial clip device on 25 by Dr. Martinez.  He had postop A-fib and was placed on amiodarone.           He then had a syncopal episode on  "2/28/2025.  Echocardiogram showed preserved LV systolic function, bioprosthetic aortic valve measurements within normal limits.  At discharge his furosemide, metoprolol, amiodarone and apixaban were stopped.  2-week heart monitor 03/2025 showed A-fib 8%.    He presents today for 3-month reassessment.  He is exercising at the gym and walking up to 20 minutes with 7.5 pound weight on both arms.  He has no exertional chest pain with that.  He also does leg presses and are very weighted machines.  He has incisional chest discomfort intermittently which last just a few seconds.  No shortness of breath or palpitation.  He has occasional dizziness if he gets up after laying down but this does not happen every time he gets up.  He has had intermittent right leg swelling in the evening over the past 2 months.  He has had prior knee surgery.  No swelling on the left.  Bright lights disturb his eyes.  He has an eye exam scheduled in the next couple months.              No Known Allergies        Family and social history reviewed.     ROS  All other systems were reviewed and are negative          Objective:     Vitals:    07/08/25 0905   BP: 118/78   BP Location: Left arm   Patient Position: Sitting   Cuff Size: Adult   Pulse: 81   SpO2: 95%   Weight: 86.2 kg (190 lb)   Height: 185.4 cm (73\")     Body mass index is 25.07 kg/m².    PHYSICAL EXAM:  Pulmonary:      Effort: Pulmonary effort is normal.      Breath sounds: Normal breath sounds.   Cardiovascular:      Normal rate. Regular rhythm.           ECG 12 Lead    Date/Time: 7/8/2025 9:15 AM  Performed by: Liss Yanez APRN    Authorized by: Liss Yanez APRN  Comparison: compared with previous ECG   Similar to previous ECG  Rhythm: sinus rhythm  Rate: normal  QRS axis: normal  Comments: No significant change             Current Outpatient Medications   Medication Sig Dispense Refill    amoxicillin (AMOXIL) 500 MG capsule 4 capsules 1 hour prior to ALL dental procedures 12 " capsule 0    atorvastatin (LIPITOR) 40 MG tablet Take 1 tablet by mouth Every Night. 90 tablet 3    lisinopril (PRINIVIL,ZESTRIL) 5 MG tablet Take 2 tablets by mouth Daily. 90 tablet 3    tamsulosin (FLOMAX) 0.4 MG capsule 24 hr capsule Take 1 capsule by mouth Every Night.      traZODone (DESYREL) 50 MG tablet TAKE ONE TABLET BY MOUTH ONCE NIGHTLY (Patient taking differently: Take 1 tablet by mouth Every Night.) 90 tablet 1     No current facility-administered medications for this visit.     Assessment:       Diagnosis Plan   1. S/P AVR (aortic valve replacement)  ECG 12 Lead      2. PAF (paroxysmal atrial fibrillation)  ECG 12 Lead      3. Primary hypertension        4. Right leg swelling  Duplex Venous Lower Extremity - Right CAR           Orders Placed This Encounter   Procedures    ECG 12 Lead     This order was created via procedure documentation     Release to patient:   Routine Release [[id number]]         Plan:       1.  75-year-old gentleman with atrial fibrillation status post cardioversion May 2024.  He had postop A-fib 2/2025.  He has history of left atrial appendage epicardial closure.  He is on aspirin 81 mg daily.  He had 8% A-fib on heart monitor 03/2025\  2.  Severe to valve regurgitation on MICHELLE September 2024 with mildly dilated aorta at 4.3 cm.  Now status post elective AVR with a 29 mm magna ease pericardial prosthesis 02/5/2025,   -Baseline echo 3/1/2025 showed normal gradients  -Amoxicillin for all dental procedures ordered  3.  Left atrial appendage epicardial closure with a 45 mm atrial clip device on 2/5/25 by Dr. Martinez.  -He is no longer on Eliquis.   4.Hyperlipidemia on atorvastatin 40 mg   5. Hypertension-Daisy is well-controlled with current dose lisinopril 5 mg daily.  6.nonobstructive, minimal coronary artery disease on cardiac catheterization.-Continue aspirin 81 mg daily and atorvastatin 40 mg     7..BPH on tamsulosin-follows with urology  8.  Tricuspid valve insufficiency- trace on  MICHELLE 09/2024  9.  Aortic Root dilation stable at 4 cm on CT chest May 2025  10.  Right lung nodule -resolved on last CT chest May 2025.  11.  Syncope 2/28/2025 felt to be related to orthostatic hypotension and metoprolol and furosemide were stopped at that time.  He was also taken off Eliquis.  No recurrence.   12.  Intermittent right leg swelling over the past 2 months.  Denies increased sodium intake.  Given the fact that he has intermittent A-fib and is not anticoagulated I recommend a venous duplex to evaluate for blood clot.  If that is negative, no further workup at this time      6-month follow-up recommended.  Call with questions or concerns.             It has been a pleasure to participate in this patient's care.      Thank you,  KARI Lockhart      **I used Dragon to dictate this note:**

## 2025-07-21 ENCOUNTER — HOSPITAL ENCOUNTER (OUTPATIENT)
Dept: CARDIOLOGY | Facility: HOSPITAL | Age: 75
Discharge: HOME | End: 2025-07-21
Admitting: NURSE PRACTITIONER
Payer: MEDICARE

## 2025-07-21 ENCOUNTER — RESULTS FOLLOW-UP (OUTPATIENT)
Dept: CARDIOLOGY | Age: 75
End: 2025-07-21
Payer: MEDICARE

## 2025-07-21 DIAGNOSIS — M79.89 RIGHT LEG SWELLING: ICD-10-CM

## 2025-07-21 LAB
BH CV LOWER VASCULAR LEFT COMMON FEMORAL AUGMENT: NORMAL
BH CV LOWER VASCULAR LEFT COMMON FEMORAL COMPETENT: NORMAL
BH CV LOWER VASCULAR LEFT COMMON FEMORAL COMPRESS: NORMAL
BH CV LOWER VASCULAR LEFT COMMON FEMORAL PHASIC: NORMAL
BH CV LOWER VASCULAR LEFT COMMON FEMORAL SPONT: NORMAL
BH CV LOWER VASCULAR RIGHT COMMON FEMORAL AUGMENT: NORMAL
BH CV LOWER VASCULAR RIGHT COMMON FEMORAL COMPETENT: NORMAL
BH CV LOWER VASCULAR RIGHT COMMON FEMORAL COMPRESS: NORMAL
BH CV LOWER VASCULAR RIGHT COMMON FEMORAL PHASIC: NORMAL
BH CV LOWER VASCULAR RIGHT COMMON FEMORAL SPONT: NORMAL
BH CV LOWER VASCULAR RIGHT DISTAL FEMORAL COMPRESS: NORMAL
BH CV LOWER VASCULAR RIGHT GASTRONEMIUS COMPRESS: NORMAL
BH CV LOWER VASCULAR RIGHT GREATER SAPH AK COMPRESS: NORMAL
BH CV LOWER VASCULAR RIGHT GREATER SAPH BK COMPRESS: NORMAL
BH CV LOWER VASCULAR RIGHT LESSER SAPH COMPRESS: NORMAL
BH CV LOWER VASCULAR RIGHT MID FEMORAL AUGMENT: NORMAL
BH CV LOWER VASCULAR RIGHT MID FEMORAL COMPETENT: NORMAL
BH CV LOWER VASCULAR RIGHT MID FEMORAL COMPRESS: NORMAL
BH CV LOWER VASCULAR RIGHT MID FEMORAL PHASIC: NORMAL
BH CV LOWER VASCULAR RIGHT MID FEMORAL SPONT: NORMAL
BH CV LOWER VASCULAR RIGHT PERONEAL COMPRESS: NORMAL
BH CV LOWER VASCULAR RIGHT POPLITEAL AUGMENT: NORMAL
BH CV LOWER VASCULAR RIGHT POPLITEAL COMPETENT: NORMAL
BH CV LOWER VASCULAR RIGHT POPLITEAL COMPRESS: NORMAL
BH CV LOWER VASCULAR RIGHT POPLITEAL PHASIC: NORMAL
BH CV LOWER VASCULAR RIGHT POPLITEAL SPONT: NORMAL
BH CV LOWER VASCULAR RIGHT POSTERIOR TIBIAL COMPRESS: NORMAL
BH CV LOWER VASCULAR RIGHT PROFUNDA FEMORAL COMPRESS: NORMAL
BH CV LOWER VASCULAR RIGHT PROXIMAL FEMORAL COMPRESS: NORMAL
BH CV LOWER VASCULAR RIGHT SAPHENOFEMORAL JUNCTION COMPRESS: NORMAL

## 2025-07-21 PROCEDURE — 93971 EXTREMITY STUDY: CPT

## (undated) DEVICE — ADAPT ANTEGRADE RETRGR

## (undated) DEVICE — CANN ART EOPA 3D NV W/CONN 20F

## (undated) DEVICE — DRSNG WND GZ PAD BORDERED 4X8IN STRL

## (undated) DEVICE — GLV SURG BIOGEL LTX PF 7 1/2

## (undated) DEVICE — SYS PERFUS SEP PLATLT W TIPS CUST

## (undated) DEVICE — CANN VESL CORNRY STR W/4MM BALN

## (undated) DEVICE — SENSR CERBRL O2 PK/2

## (undated) DEVICE — BG TRANSF W/COUPLER SPK 600ML

## (undated) DEVICE — GW HITORQUE/BAL MID/WT J W/HCOAT .014 3X190CM

## (undated) DEVICE — SPONGE,DISSECTOR,K,XRAY,9/16"X1/4",STRL: Brand: MEDLINE

## (undated) DEVICE — Device

## (undated) DEVICE — CATH VENT DLP W/CONN MALL NOVNT SILICON 16FR 16IN

## (undated) DEVICE — DRP SLUSH WARMR MACH RECTG 66X44IN

## (undated) DEVICE — CANN AORT ROOT DLP VNT/8IN 14G 7F

## (undated) DEVICE — CATH DIAG IMPULSE FL3.5 5F 100CM

## (undated) DEVICE — 28 FR STRAIGHT – SOFT PVC CATHETER: Brand: PVC THORACIC CATHETERS

## (undated) DEVICE — TBG ART PRESS 60 IN

## (undated) DEVICE — PK CATH CARD 40

## (undated) DEVICE — PK HEART OPN 40

## (undated) DEVICE — FEMORAL ENTRY ANGIOGRAPHY SHIELD-YELLOW: Brand: RADPAD

## (undated) DEVICE — 3M™ TEGADERM™ CHG DRESSING 25/CARTON 4 CARTONS/CASE 1658: Brand: TEGADERM™

## (undated) DEVICE — SYR LUERLOK 5CC

## (undated) DEVICE — SOL ISO/ALC 70PCT 4OZ

## (undated) DEVICE — ORGANIZER SUT SHELIGH 3T 213013

## (undated) DEVICE — PK ATS CUST W CARDIOTOMY RESEVOIR

## (undated) DEVICE — 28 FR RIGHT ANGLE – SOFT PVC CATHETER: Brand: PVC THORACIC CATHETERS

## (undated) DEVICE — GLIDESHEATH SLENDER STAINLESS STEEL KIT: Brand: GLIDESHEATH SLENDER

## (undated) DEVICE — SYR LL TP 10ML STRL

## (undated) DEVICE — SYR LUERLOK 30CC

## (undated) DEVICE — KT MANIFLD CARDIAC

## (undated) DEVICE — 8 FOOT DISPOSABLE EXTENSION CABLE WITH SAFE CONNECT / ALLIGATOR CLIP

## (undated) DEVICE — ST TOURNI COMPL A/ 7IN

## (undated) DEVICE — 1LYRTR 16FR10ML100%SILTMPS SNP: Brand: MEDLINE INDUSTRIES, INC.

## (undated) DEVICE — GUIDE SELECT ATRICLIP

## (undated) DEVICE — HEMOCONCENTRATOR PERFUS LPS06

## (undated) DEVICE — TR BAND RADIAL ARTERY COMPRESSION DEVICE: Brand: TR BAND

## (undated) DEVICE — DECANTER BAG 9": Brand: MEDLINE INDUSTRIES, INC.

## (undated) DEVICE — CLAMP INSERT: Brand: STEALTH® CLAMP INSERT

## (undated) DEVICE — CATH DIAG IMPULSE FR4 5F 100CM

## (undated) DEVICE — DGW .035 FC J3MM 260CM TEF: Brand: EMERALD

## (undated) DEVICE — OASIS DRAIN, SINGLE, INLINE & ATS COMPATIBLE: Brand: OASIS

## (undated) DEVICE — SPNG GZ WOVN 4X4IN 12PLY 10/BX STRL

## (undated) DEVICE — CANN RETRGR STYLET RSCP 15F

## (undated) DEVICE — MARKER,SKIN,WI/RULER AND LABELS: Brand: MEDLINE

## (undated) DEVICE — LOU PACE DEFIB: Brand: MEDLINE INDUSTRIES, INC.

## (undated) DEVICE — PK PERFUS CUST W/CARDIOPLEGIA